# Patient Record
Sex: FEMALE | Race: WHITE | NOT HISPANIC OR LATINO | Employment: OTHER | URBAN - METROPOLITAN AREA
[De-identification: names, ages, dates, MRNs, and addresses within clinical notes are randomized per-mention and may not be internally consistent; named-entity substitution may affect disease eponyms.]

---

## 2021-07-26 LAB — BCS RECOMMENDATION EXT: NORMAL

## 2021-09-22 LAB — BMD RECOMMENDATION EXT: NORMAL

## 2022-06-14 ENCOUNTER — TELEPHONE (OUTPATIENT)
Dept: PRIMARY CARE CLINIC | Facility: CLINIC | Age: 67
End: 2022-06-14

## 2022-06-14 ENCOUNTER — OFFICE VISIT (OUTPATIENT)
Dept: PRIMARY CARE CLINIC | Facility: CLINIC | Age: 67
End: 2022-06-14
Payer: MEDICARE

## 2022-06-14 VITALS
RESPIRATION RATE: 18 BRPM | TEMPERATURE: 98 F | HEIGHT: 64 IN | HEART RATE: 74 BPM | WEIGHT: 120.5 LBS | SYSTOLIC BLOOD PRESSURE: 112 MMHG | DIASTOLIC BLOOD PRESSURE: 70 MMHG | BODY MASS INDEX: 20.57 KG/M2 | OXYGEN SATURATION: 97 %

## 2022-06-14 DIAGNOSIS — E03.9 HYPOTHYROIDISM, UNSPECIFIED TYPE: ICD-10-CM

## 2022-06-14 DIAGNOSIS — M81.0 AGE-RELATED OSTEOPOROSIS WITHOUT CURRENT PATHOLOGICAL FRACTURE: ICD-10-CM

## 2022-06-14 DIAGNOSIS — Z00.00 ANNUAL PHYSICAL EXAM: ICD-10-CM

## 2022-06-14 DIAGNOSIS — M54.41 CHRONIC LOW BACK PAIN WITH RIGHT-SIDED SCIATICA, UNSPECIFIED BACK PAIN LATERALITY: ICD-10-CM

## 2022-06-14 DIAGNOSIS — G89.29 CHRONIC LOW BACK PAIN WITH RIGHT-SIDED SCIATICA, UNSPECIFIED BACK PAIN LATERALITY: ICD-10-CM

## 2022-06-14 DIAGNOSIS — Z76.89 ENCOUNTER TO ESTABLISH CARE: Primary | ICD-10-CM

## 2022-06-14 DIAGNOSIS — Z13.6 ENCOUNTER FOR SCREENING FOR CARDIOVASCULAR DISORDERS: ICD-10-CM

## 2022-06-14 PROCEDURE — 99204 PR OFFICE/OUTPT VISIT, NEW, LEVL IV, 45-59 MIN: ICD-10-PCS | Mod: S$GLB,,, | Performed by: STUDENT IN AN ORGANIZED HEALTH CARE EDUCATION/TRAINING PROGRAM

## 2022-06-14 PROCEDURE — 3074F PR MOST RECENT SYSTOLIC BLOOD PRESSURE < 130 MM HG: ICD-10-PCS | Mod: CPTII,S$GLB,, | Performed by: STUDENT IN AN ORGANIZED HEALTH CARE EDUCATION/TRAINING PROGRAM

## 2022-06-14 PROCEDURE — 3078F DIAST BP <80 MM HG: CPT | Mod: CPTII,S$GLB,, | Performed by: STUDENT IN AN ORGANIZED HEALTH CARE EDUCATION/TRAINING PROGRAM

## 2022-06-14 PROCEDURE — 1159F MED LIST DOCD IN RCRD: CPT | Mod: CPTII,S$GLB,, | Performed by: STUDENT IN AN ORGANIZED HEALTH CARE EDUCATION/TRAINING PROGRAM

## 2022-06-14 PROCEDURE — 1101F PR PT FALLS ASSESS DOC 0-1 FALLS W/OUT INJ PAST YR: ICD-10-PCS | Mod: CPTII,S$GLB,, | Performed by: STUDENT IN AN ORGANIZED HEALTH CARE EDUCATION/TRAINING PROGRAM

## 2022-06-14 PROCEDURE — 99999 PR PBB SHADOW E&M-NEW PATIENT-LVL IV: CPT | Mod: PBBFAC,,, | Performed by: STUDENT IN AN ORGANIZED HEALTH CARE EDUCATION/TRAINING PROGRAM

## 2022-06-14 PROCEDURE — 1160F RVW MEDS BY RX/DR IN RCRD: CPT | Mod: CPTII,S$GLB,, | Performed by: STUDENT IN AN ORGANIZED HEALTH CARE EDUCATION/TRAINING PROGRAM

## 2022-06-14 PROCEDURE — 1126F PR PAIN SEVERITY QUANTIFIED, NO PAIN PRESENT: ICD-10-PCS | Mod: CPTII,S$GLB,, | Performed by: STUDENT IN AN ORGANIZED HEALTH CARE EDUCATION/TRAINING PROGRAM

## 2022-06-14 PROCEDURE — 1101F PT FALLS ASSESS-DOCD LE1/YR: CPT | Mod: CPTII,S$GLB,, | Performed by: STUDENT IN AN ORGANIZED HEALTH CARE EDUCATION/TRAINING PROGRAM

## 2022-06-14 PROCEDURE — 3288F FALL RISK ASSESSMENT DOCD: CPT | Mod: CPTII,S$GLB,, | Performed by: STUDENT IN AN ORGANIZED HEALTH CARE EDUCATION/TRAINING PROGRAM

## 2022-06-14 PROCEDURE — 99999 PR PBB SHADOW E&M-NEW PATIENT-LVL IV: ICD-10-PCS | Mod: PBBFAC,,, | Performed by: STUDENT IN AN ORGANIZED HEALTH CARE EDUCATION/TRAINING PROGRAM

## 2022-06-14 PROCEDURE — 1126F AMNT PAIN NOTED NONE PRSNT: CPT | Mod: CPTII,S$GLB,, | Performed by: STUDENT IN AN ORGANIZED HEALTH CARE EDUCATION/TRAINING PROGRAM

## 2022-06-14 PROCEDURE — 3078F PR MOST RECENT DIASTOLIC BLOOD PRESSURE < 80 MM HG: ICD-10-PCS | Mod: CPTII,S$GLB,, | Performed by: STUDENT IN AN ORGANIZED HEALTH CARE EDUCATION/TRAINING PROGRAM

## 2022-06-14 PROCEDURE — 3288F PR FALLS RISK ASSESSMENT DOCUMENTED: ICD-10-PCS | Mod: CPTII,S$GLB,, | Performed by: STUDENT IN AN ORGANIZED HEALTH CARE EDUCATION/TRAINING PROGRAM

## 2022-06-14 PROCEDURE — 99204 OFFICE O/P NEW MOD 45 MIN: CPT | Mod: S$GLB,,, | Performed by: STUDENT IN AN ORGANIZED HEALTH CARE EDUCATION/TRAINING PROGRAM

## 2022-06-14 PROCEDURE — 3074F SYST BP LT 130 MM HG: CPT | Mod: CPTII,S$GLB,, | Performed by: STUDENT IN AN ORGANIZED HEALTH CARE EDUCATION/TRAINING PROGRAM

## 2022-06-14 PROCEDURE — 3008F PR BODY MASS INDEX (BMI) DOCUMENTED: ICD-10-PCS | Mod: CPTII,S$GLB,, | Performed by: STUDENT IN AN ORGANIZED HEALTH CARE EDUCATION/TRAINING PROGRAM

## 2022-06-14 PROCEDURE — 1159F PR MEDICATION LIST DOCUMENTED IN MEDICAL RECORD: ICD-10-PCS | Mod: CPTII,S$GLB,, | Performed by: STUDENT IN AN ORGANIZED HEALTH CARE EDUCATION/TRAINING PROGRAM

## 2022-06-14 PROCEDURE — 1160F PR REVIEW ALL MEDS BY PRESCRIBER/CLIN PHARMACIST DOCUMENTED: ICD-10-PCS | Mod: CPTII,S$GLB,, | Performed by: STUDENT IN AN ORGANIZED HEALTH CARE EDUCATION/TRAINING PROGRAM

## 2022-06-14 PROCEDURE — 3008F BODY MASS INDEX DOCD: CPT | Mod: CPTII,S$GLB,, | Performed by: STUDENT IN AN ORGANIZED HEALTH CARE EDUCATION/TRAINING PROGRAM

## 2022-06-14 RX ORDER — LYSINE HCL 500 MG
1 TABLET ORAL 2 TIMES DAILY
Qty: 180 TABLET | Refills: 3 | Status: SHIPPED | OUTPATIENT
Start: 2022-06-14 | End: 2023-09-26

## 2022-06-14 RX ORDER — LEVOTHYROXINE SODIUM 75 UG/1
TABLET ORAL DAILY
COMMUNITY
Start: 2006-01-01 | End: 2022-08-31

## 2022-06-14 RX ORDER — ALENDRONATE SODIUM 70 MG/1
TABLET ORAL
COMMUNITY
Start: 2022-06-06 | End: 2022-09-28 | Stop reason: SDUPTHER

## 2022-06-14 NOTE — PROGRESS NOTES
Subjective:       Patient ID: Kalina Ivan is a 67 y.o. female.    Chief Complaint: Annual Exam and Establish Care      HPI:  67 y.o. female presents to [facility] here for annual and to establish care    Acute concerns?: Here for annual exam, feels well overall. 6 months ago when appointment was made was feeling off, but likely thyroid related. Was recently changed from Armor thyroid hormone now on synthroid.    Reports bone scan was performed 2/2022 and now on alendronate and was concerned with history of silent reflux.    Reports history of lower back pain history and in PT a this time. In [facility] therapy. No history of imaging out of bone scan.    Onset?: Years  Progression?: Persistent, since age [age]  Inciting incident/new physical activity?: Fell down steps and hit tailbone age 11  Past trauma/surgery?: Fall on tailbone, no surgeries  Recurrent?: Waxes and wanes  Description?: Dull ache, can get twinge pains  Radiates?: Can radiate into lateral thigh at times  Severity?: 9/10 at worst, quite some time, ok a this time, just a twinge  Worsening factors?: Heavy lifting and slouching  Interventions?: Cortisone injection in past did help, stretching and exercise, pilates helped  Did interventions help?: Yes    Last PCP?: Saw Azevedo at [facility]  Allergies: Hazelnut  Medical History: osteoporosis, hypothyroidism (hashimoto's likely), silent reflux  Medications: alendronate 70 mg qWeek, levothyroxine 75 mg  Surgical History: None  Family History: Father leukemia early 40s; no known autoimmune disease in family  Social History: Never smoker, minimal EtOH, no illicits    No dementia in family. Memory is good overall and no concerns.  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 Hasn't been able to present to Dermatology during an episode.    Singles in 2014, was curious if related to palm findings.    Is receiving  "2nd shingles dose next week.    Fasting?: No  Hep C screening?: Amenable, await lab request  HIV screening?: Amenable, await lab request  DEXA?: 2/2022  Colonoscopy?: 1/2021 10 year follow-up  Mammogram?: 7/2021, was normal, will follow-up at [facility]  Last tetanus vaccine?: 2021 cut in garden at clinic [facility]    Review of Systems   Constitutional: Negative for chills, diaphoresis, fatigue and fever.   HENT: Negative for congestion, sinus pressure, sneezing and sore throat.    Respiratory: Negative for cough and shortness of breath.    Cardiovascular: Negative for chest pain and palpitations.   Gastrointestinal: Negative for abdominal pain, diarrhea, nausea and vomiting.   Endocrine: Negative for cold intolerance, heat intolerance, polydipsia and polyuria.   Musculoskeletal: Negative for joint swelling and myalgias.   Skin: Negative for rash and wound.        Intermittent dermatitis to left hand   Neurological: Negative for dizziness, weakness, light-headedness and numbness.       Objective:      Vitals:    06/14/22 1022   BP: 112/70   BP Location: Right arm   Patient Position: Sitting   BP Method: Medium (Manual)   Pulse: 74   Resp: 18   Temp: 98 °F (36.7 °C)   TempSrc: Oral   SpO2: 97%   Weight: 54.6 kg (120 lb 7.7 oz)   Height: 5' 4" (1.626 m)     Physical Exam  Vitals reviewed.   Constitutional:       General: She is not in acute distress.     Appearance: Normal appearance. She is not ill-appearing.   HENT:      Head: Normocephalic and atraumatic.   Eyes:      General:         Right eye: No discharge.         Left eye: No discharge.      Conjunctiva/sclera: Conjunctivae normal.   Cardiovascular:      Rate and Rhythm: Normal rate and regular rhythm.      Pulses:           Dorsalis pedis pulses are 1+ on the right side and 1+ on the left side.      Heart sounds: Normal heart sounds.   Pulmonary:      Effort: Pulmonary effort is normal.      Breath sounds: Normal breath sounds.   Abdominal:     "  General: Abdomen is flat. Bowel sounds are normal. There is no distension.      Palpations: Abdomen is soft. There is no mass.      Tenderness: There is no abdominal tenderness. There is no guarding or rebound.   Musculoskeletal:         General: No deformity.      Cervical back: Neck supple. No rigidity.      Right lower leg: No edema.      Left lower leg: No edema.   Skin:     General: Skin is warm and dry.      Coloration: Skin is not jaundiced.   Neurological:      General: No focal deficit present.      Mental Status: She is alert and oriented to person, place, and time.   Psychiatric:         Mood and Affect: Mood normal.         Behavior: Behavior normal.             No results found for: NA, K, CL, CO2, BUN, CREATININE, GLUCOSE, ANIONGAP  No results found for: HGBA1C  No results found for: BNP, BNPTRIAGEBLO    No results found for: WBC, HGB, HCT, PLT, GRAN  No results found for: CHOL, HDL, LDLCALC, TRIG       Current Outpatient Medications:     alendronate (FOSAMAX) 70 MG tablet, every 7 days., Disp: , Rfl:     levothyroxine (SYNTHROID) 75 MCG tablet, once daily at 6am., Disp: , Rfl:     calcium carbonate-vit D3-min 600 mg calcium- 400 unit Tab, Take 1 tablet by mouth 2 (two) times daily., Disp: 180 tablet, Rfl: 3        Assessment:       1. Encounter to establish care    2. Annual physical exam    3. Hypothyroidism, unspecified type    4. Chronic low back pain with right-sided sciatica, unspecified back pain laterality    5. Age-related osteoporosis without current pathological fracture    6. Encounter for screening for cardiovascular disorders           Plan:       Encounter to establish care  Annual physical exam  -     Comprehensive Metabolic Panel; Future; Expected date: 06/14/2022  -     CBC Auto Differential; Future; Expected date: 06/14/2022    Hypothyroidism, unspecified type  -     TSH; Future; Expected date: 06/14/2022  -     T4, Free; Future; Expected date: 06/14/2022  -     CBC Auto  Differential; Future; Expected date: 06/14/2022    Chronic low back pain with right-sided sciatica, unspecified back pain laterality  Age-related osteoporosis without current pathological fracture  -     calcium carbonate-vit D3-min 600 mg calcium- 400 unit Tab; Take 1 tablet by mouth 2 (two) times daily.  Dispense: 180 tablet; Refill: 3  - Continue PT, patient to contact clinic if desires Pain Management referral as corticosteroid injections in past have worked well for pain    Encounter for screening for cardiovascular disorders  -     Lipid Panel; Future; Expected date: 06/14/2022    Patient will reach out to clinic for Derm referral if active reash to hand recurrs  RTC in 6 months     audio

## 2022-06-14 NOTE — TELEPHONE ENCOUNTER
CASSIA sent to [facility]  ([facility]) at [phone] to get the patient's colonoscopy report.     Patient has 2 charts with us. We were able to print her DEXA and her Mammogram from her 2nd chart MRN [id number]

## 2022-06-15 ENCOUNTER — PATIENT MESSAGE (OUTPATIENT)
Dept: ADMINISTRATIVE | Facility: HOSPITAL | Age: 67
End: 2022-06-15
Payer: MEDICARE

## 2022-06-21 ENCOUNTER — PATIENT OUTREACH (OUTPATIENT)
Dept: ADMINISTRATIVE | Facility: HOSPITAL | Age: 67
End: 2022-06-21
Payer: MEDICARE

## 2022-06-21 NOTE — PROGRESS NOTES
Chart review done.  updated. Immunizations reviewed & updated. Care Everywhere updated.   CASSIA SENT TO DR LOBATO FOR THE PATIENT COLONOSCOPY RESULTS

## 2022-06-21 NOTE — LETTER
AUTHORIZATION FOR RELEASE OF   CONFIDENTIAL INFORMATION    Dear MEDICAL RECORDS,    We are seeing Kalina Ivan, date of birth 1955, in the clinic at [facility]. Grzegorz Bolton MD is the patient's PCP. Kalina Ivan has an outstanding lab/procedure at the time we reviewed her chart. In order to help keep her health information updated, she has authorized us to request the following medical record(s):        (  )  MAMMOGRAM                                      ( X )  COLONOSCOPY      (  )  PAP SMEAR                                          (  )  OUTSIDE LAB RESULTS     (  )  DEXA SCAN                                          (  )  EYE EXAM            (  )  FOOT EXAM                                          (  )  ENTIRE RECORD     (  )  OUTSIDE IMMUNIZATIONS                 (  )  _______________         [facility], [phone]    Patient Name: Kalina Ivan  : 1955  Patient Phone #: [phone]

## 2022-07-05 ENCOUNTER — TELEPHONE (OUTPATIENT)
Dept: PRIMARY CARE CLINIC | Facility: CLINIC | Age: 67
End: 2022-07-05
Payer: MEDICARE

## 2022-07-05 DIAGNOSIS — H53.31 ABNORMAL RETINAL CORRESPONDENCE: Primary | ICD-10-CM

## 2022-07-06 NOTE — TELEPHONE ENCOUNTER
----- Message from Christy Luis sent at 7/5/2022  1:06 PM CDT -----  Contact: [phone]  Pt is calling she is needing a referral for an opthalmology they are thinking she has a tear in her retina please advise and give return call ASAP     
Called pt. No answer left detailed vm that Dr. Bolton has placed a  ophthalmology referral for her.   
Quality 226: Preventive Care And Screening: Tobacco Use: Screening And Cessation Intervention: Patient screened for tobacco use and is an ex/non-smoker
Quality 110: Preventive Care And Screening: Influenza Immunization: Influenza Immunization Administered during Influenza season
Detail Level: Detailed
Quality 111:Pneumonia Vaccination Status For Older Adults: Pneumococcal Vaccination Previously Received

## 2022-07-27 ENCOUNTER — PATIENT OUTREACH (OUTPATIENT)
Dept: ADMINISTRATIVE | Facility: HOSPITAL | Age: 67
End: 2022-07-27
Payer: MEDICARE

## 2022-07-27 NOTE — PROGRESS NOTES
Chart review done. HM updated. Immunizations reviewed & updated. Care Everywhere updated.   CASSIA SENT TO DR LOBATO AND [facility] FOR THE PATIENT COLONOSCOPY RESULTS

## 2022-07-27 NOTE — LETTER
AUTHORIZATION FOR RELEASE OF   CONFIDENTIAL INFORMATION    Dear MEDICAL RECORDS  2ND REQUEST,    We are seeing Kalina Ivan, date of birth 1955, in the clinic at [facility]. Grzegorz Bolton MD is the patient's PCP. Kalina Ivan has an outstanding lab/procedure at the time we reviewed her chart. In order to help keep her health information updated, she has authorized us to request the following medical record(s):        (  )  MAMMOGRAM                                      ( X )  COLONOSCOPY      (  )  PAP SMEAR                                          (  )  OUTSIDE LAB RESULTS     (  )  DEXA SCAN                                          (  )  EYE EXAM            (  )  FOOT EXAM                                          (  )  ENTIRE RECORD     (  )  OUTSIDE IMMUNIZATIONS                 (  )  _______________         Please fax records to [facility], Matthew D Morgan, MD, [phone]    Patient Name: Kalina Ivan  : 1955  Patient Phone #: [phone]

## 2022-07-27 NOTE — LETTER
AUTHORIZATION FOR RELEASE OF   CONFIDENTIAL INFORMATION    Dear MEDICAL RECORDS,    We are seeing Kalina Ivan, date of birth 1955, in the clinic at [facility]. Grzegorz Bolton MD is the patient's PCP. Kalina Ivan has an outstanding lab/procedure at the time we reviewed her chart. In order to help keep her health information updated, she has authorized us to request the following medical record(s):        (  )  MAMMOGRAM                                      (X  )  COLONOSCOPY      (  )  PAP SMEAR                                          (  )  OUTSIDE LAB RESULTS     (  )  DEXA SCAN                                          (  )  EYE EXAM            (  )  FOOT EXAM                                          (  )  ENTIRE RECORD     (  )  OUTSIDE IMMUNIZATIONS                 (  )  _______________         [facility], [phone]    Patient Name: Kalina Ivan  : 1955  Patient Phone #: [phone]

## 2022-07-29 ENCOUNTER — TELEPHONE (OUTPATIENT)
Dept: PRIMARY CARE CLINIC | Facility: CLINIC | Age: 67
End: 2022-07-29
Payer: MEDICARE

## 2022-07-29 DIAGNOSIS — Z12.31 BREAST CANCER SCREENING BY MAMMOGRAM: Primary | ICD-10-CM

## 2022-07-29 NOTE — TELEPHONE ENCOUNTER
----- Message from Luz Maria Devine sent at 7/29/2022  9:05 AM CDT -----  Contact: Patient, [phone]  Caller is requesting to schedule their annual screening mammogram appointment. Order is not listed in Epic.  Please enter order and contact patient to schedule.  Would the patient like a call back, or a response through their [facility] portal?:   Call back, please fax to [employer]. Thanks.

## 2022-08-24 ENCOUNTER — PATIENT MESSAGE (OUTPATIENT)
Dept: PRIMARY CARE CLINIC | Facility: CLINIC | Age: 67
End: 2022-08-24
Payer: MEDICARE

## 2022-08-31 ENCOUNTER — OFFICE VISIT (OUTPATIENT)
Dept: PRIMARY CARE CLINIC | Facility: CLINIC | Age: 67
End: 2022-08-31
Payer: MEDICARE

## 2022-08-31 ENCOUNTER — PATIENT MESSAGE (OUTPATIENT)
Dept: PRIMARY CARE CLINIC | Facility: CLINIC | Age: 67
End: 2022-08-31

## 2022-08-31 VITALS
RESPIRATION RATE: 16 BRPM | TEMPERATURE: 98 F | HEIGHT: 64 IN | SYSTOLIC BLOOD PRESSURE: 136 MMHG | BODY MASS INDEX: 20.65 KG/M2 | WEIGHT: 120.94 LBS | DIASTOLIC BLOOD PRESSURE: 72 MMHG | OXYGEN SATURATION: 98 % | HEART RATE: 71 BPM

## 2022-08-31 DIAGNOSIS — Z51.81 MEDICATION MONITORING ENCOUNTER: ICD-10-CM

## 2022-08-31 DIAGNOSIS — F32.A DEPRESSION, UNSPECIFIED DEPRESSION TYPE: ICD-10-CM

## 2022-08-31 DIAGNOSIS — M54.9 DORSALGIA, UNSPECIFIED: ICD-10-CM

## 2022-08-31 DIAGNOSIS — E03.9 HYPOTHYROIDISM, UNSPECIFIED TYPE: Primary | ICD-10-CM

## 2022-08-31 DIAGNOSIS — G89.29 CHRONIC BILATERAL LOW BACK PAIN WITHOUT SCIATICA: ICD-10-CM

## 2022-08-31 DIAGNOSIS — M54.50 CHRONIC BILATERAL LOW BACK PAIN WITHOUT SCIATICA: ICD-10-CM

## 2022-08-31 DIAGNOSIS — M53.3 SI (SACROILIAC) JOINT DYSFUNCTION: ICD-10-CM

## 2022-08-31 DIAGNOSIS — R21 RASH AND NONSPECIFIC SKIN ERUPTION: ICD-10-CM

## 2022-08-31 PROCEDURE — 1125F PR PAIN SEVERITY QUANTIFIED, PAIN PRESENT: ICD-10-PCS | Mod: CPTII,S$GLB,, | Performed by: STUDENT IN AN ORGANIZED HEALTH CARE EDUCATION/TRAINING PROGRAM

## 2022-08-31 PROCEDURE — 3008F PR BODY MASS INDEX (BMI) DOCUMENTED: ICD-10-PCS | Mod: CPTII,S$GLB,, | Performed by: STUDENT IN AN ORGANIZED HEALTH CARE EDUCATION/TRAINING PROGRAM

## 2022-08-31 PROCEDURE — 1159F PR MEDICATION LIST DOCUMENTED IN MEDICAL RECORD: ICD-10-PCS | Mod: CPTII,S$GLB,, | Performed by: STUDENT IN AN ORGANIZED HEALTH CARE EDUCATION/TRAINING PROGRAM

## 2022-08-31 PROCEDURE — 3008F BODY MASS INDEX DOCD: CPT | Mod: CPTII,S$GLB,, | Performed by: STUDENT IN AN ORGANIZED HEALTH CARE EDUCATION/TRAINING PROGRAM

## 2022-08-31 PROCEDURE — 3078F DIAST BP <80 MM HG: CPT | Mod: CPTII,S$GLB,, | Performed by: STUDENT IN AN ORGANIZED HEALTH CARE EDUCATION/TRAINING PROGRAM

## 2022-08-31 PROCEDURE — 99999 PR PBB SHADOW E&M-EST. PATIENT-LVL V: CPT | Mod: PBBFAC,,, | Performed by: STUDENT IN AN ORGANIZED HEALTH CARE EDUCATION/TRAINING PROGRAM

## 2022-08-31 PROCEDURE — 1101F PR PT FALLS ASSESS DOC 0-1 FALLS W/OUT INJ PAST YR: ICD-10-PCS | Mod: CPTII,S$GLB,, | Performed by: STUDENT IN AN ORGANIZED HEALTH CARE EDUCATION/TRAINING PROGRAM

## 2022-08-31 PROCEDURE — 99999 PR PBB SHADOW E&M-EST. PATIENT-LVL V: ICD-10-PCS | Mod: PBBFAC,,, | Performed by: STUDENT IN AN ORGANIZED HEALTH CARE EDUCATION/TRAINING PROGRAM

## 2022-08-31 PROCEDURE — 1160F RVW MEDS BY RX/DR IN RCRD: CPT | Mod: CPTII,S$GLB,, | Performed by: STUDENT IN AN ORGANIZED HEALTH CARE EDUCATION/TRAINING PROGRAM

## 2022-08-31 PROCEDURE — 1125F AMNT PAIN NOTED PAIN PRSNT: CPT | Mod: CPTII,S$GLB,, | Performed by: STUDENT IN AN ORGANIZED HEALTH CARE EDUCATION/TRAINING PROGRAM

## 2022-08-31 PROCEDURE — 1101F PT FALLS ASSESS-DOCD LE1/YR: CPT | Mod: CPTII,S$GLB,, | Performed by: STUDENT IN AN ORGANIZED HEALTH CARE EDUCATION/TRAINING PROGRAM

## 2022-08-31 PROCEDURE — 1159F MED LIST DOCD IN RCRD: CPT | Mod: CPTII,S$GLB,, | Performed by: STUDENT IN AN ORGANIZED HEALTH CARE EDUCATION/TRAINING PROGRAM

## 2022-08-31 PROCEDURE — 3075F PR MOST RECENT SYSTOLIC BLOOD PRESS GE 130-139MM HG: ICD-10-PCS | Mod: CPTII,S$GLB,, | Performed by: STUDENT IN AN ORGANIZED HEALTH CARE EDUCATION/TRAINING PROGRAM

## 2022-08-31 PROCEDURE — 3078F PR MOST RECENT DIASTOLIC BLOOD PRESSURE < 80 MM HG: ICD-10-PCS | Mod: CPTII,S$GLB,, | Performed by: STUDENT IN AN ORGANIZED HEALTH CARE EDUCATION/TRAINING PROGRAM

## 2022-08-31 PROCEDURE — 3288F PR FALLS RISK ASSESSMENT DOCUMENTED: ICD-10-PCS | Mod: CPTII,S$GLB,, | Performed by: STUDENT IN AN ORGANIZED HEALTH CARE EDUCATION/TRAINING PROGRAM

## 2022-08-31 PROCEDURE — 3075F SYST BP GE 130 - 139MM HG: CPT | Mod: CPTII,S$GLB,, | Performed by: STUDENT IN AN ORGANIZED HEALTH CARE EDUCATION/TRAINING PROGRAM

## 2022-08-31 PROCEDURE — 3288F FALL RISK ASSESSMENT DOCD: CPT | Mod: CPTII,S$GLB,, | Performed by: STUDENT IN AN ORGANIZED HEALTH CARE EDUCATION/TRAINING PROGRAM

## 2022-08-31 PROCEDURE — 99214 PR OFFICE/OUTPT VISIT, EST, LEVL IV, 30-39 MIN: ICD-10-PCS | Mod: S$GLB,,, | Performed by: STUDENT IN AN ORGANIZED HEALTH CARE EDUCATION/TRAINING PROGRAM

## 2022-08-31 PROCEDURE — 1160F PR REVIEW ALL MEDS BY PRESCRIBER/CLIN PHARMACIST DOCUMENTED: ICD-10-PCS | Mod: CPTII,S$GLB,, | Performed by: STUDENT IN AN ORGANIZED HEALTH CARE EDUCATION/TRAINING PROGRAM

## 2022-08-31 PROCEDURE — 99214 OFFICE O/P EST MOD 30 MIN: CPT | Mod: S$GLB,,, | Performed by: STUDENT IN AN ORGANIZED HEALTH CARE EDUCATION/TRAINING PROGRAM

## 2022-08-31 RX ORDER — ST. JOHN'S WORT 300 MG
300 CAPSULE ORAL
Qty: 90 EACH | Refills: 11 | Status: SHIPPED | OUTPATIENT
Start: 2022-08-31 | End: 2022-12-20

## 2022-08-31 RX ORDER — THYROID 30 MG/1
45 TABLET ORAL
Qty: 45 TABLET | Refills: 11 | Status: SHIPPED | OUTPATIENT
Start: 2022-08-31 | End: 2022-10-07 | Stop reason: SDUPTHER

## 2022-08-31 NOTE — PROGRESS NOTES
"Subjective:       Patient ID: Kalina Ivan is a 67 y.o. female.    Chief Complaint: Follow-up (Discuss thyroid meds), Low-back Pain (Wants referral to ortho), and Hip Pain (bilateral)      HPI:  67 y.o. female presents to [facility] to discuss changing thyroid medication back to armour thyroid    Was on in past and would like to revert back to prior.    Patient reports not feeling great overall at this time. Feels like she is somewhat depressed, feels could be related to levothyroxine.    Patient is performing regular exercise at this time. Has been attending PT since February. Was recently given a Ulysses Chi video from a friend.    Has been in PT since February and still having back pain. Following with [facility].     No falls or incontinence.    Patient has daily meditation in lace at this time.    Has been in therapy, not last 10 years. Is interested in talking with therapist at this time.    Review of Systems   Constitutional:  Positive for fatigue. Negative for chills, diaphoresis and fever.   Respiratory:  Negative for cough and shortness of breath.    Cardiovascular:  Negative for chest pain and palpitations.   Gastrointestinal:  Negative for abdominal pain, diarrhea, nausea and vomiting.   Musculoskeletal:  Positive for back pain.   Skin:  Negative for rash and wound.   Neurological:  Positive for numbness (in legs). Negative for weakness and headaches.   Psychiatric/Behavioral:  Positive for dysphoric mood. Negative for suicidal ideas.      Objective:      Vitals:    08/31/22 0810   BP: 136/72   BP Location: Left arm   Patient Position: Sitting   BP Method: Medium (Manual)   Pulse: 71   Resp: 16   Temp: 98 °F (36.7 °C)   TempSrc: Oral   SpO2: 98%   Weight: 54.9 kg (120 lb 14.8 oz)   Height: 5' 4" (1.626 m)     Physical Exam  Vitals reviewed.   Constitutional:       General: She is not in acute distress.     Appearance: Normal appearance. She is not ill-appearing.   HENT:      Head: " Normocephalic and atraumatic.      Mouth/Throat:      Mouth: Mucous membranes are moist.      Pharynx: Oropharynx is clear.   Eyes:      General:         Right eye: No discharge.         Left eye: No discharge.   Cardiovascular:      Rate and Rhythm: Normal rate and regular rhythm.      Pulses: Normal pulses.      Heart sounds: Normal heart sounds.   Pulmonary:      Effort: Pulmonary effort is normal.      Breath sounds: Normal breath sounds.   Musculoskeletal:         General: No deformity.      Lumbar back: No swelling, edema, deformity, signs of trauma, lacerations, spasms, tenderness or bony tenderness. Normal range of motion (Aching with extension of back). Negative right straight leg raise test and negative left straight leg raise test. No scoliosis.      Comments: Mild non-raised, non-tender, erythematous rash to low back. Coalescent red incomplete (non-annular) rings with central clearing. (patient attributes to heating pad use). Pain bilateral SI joints.   Skin:     General: Skin is warm and dry.      Coloration: Skin is not jaundiced.   Neurological:      General: No focal deficit present.      Mental Status: She is alert and oriented to person, place, and time.   Psychiatric:         Mood and Affect: Mood normal.         Behavior: Behavior normal.           Lab Results   Component Value Date     06/21/2022    K 4.2 06/21/2022     06/21/2022    CO2 24 06/21/2022    BUN 14 06/21/2022    CREATININE 0.8 06/21/2022    ANIONGAP 10 06/21/2022     Lab Results   Component Value Date    HGBA1C 5.4 08/15/2022     No results found for: BNP, BNPTRIAGEBLO    Lab Results   Component Value Date    WBC 6.84 06/21/2022    HGB 13.9 06/21/2022    HCT 41.8 06/21/2022     06/21/2022    GRAN 3.9 06/21/2022    GRAN 56.4 06/21/2022     Lab Results   Component Value Date    CHOL 237 (H) 06/21/2022    HDL 67 06/21/2022    LDLCALC 146.6 06/21/2022    TRIG 117 06/21/2022          Current Outpatient Medications:      alendronate (FOSAMAX) 70 MG tablet, every 7 days., Disp: , Rfl:     calcium carbonate-vit D3-min 600 mg calcium- 400 unit Tab, Take 1 tablet by mouth 2 (two) times daily., Disp: 180 tablet, Rfl: 3    Johnathon's wort 300 mg Cap, Take 300 mg by mouth 3 (three) times daily with meals., Disp: 90 each, Rfl: 11    thyroid, pork, (ARMOUR THYROID) 30 mg Tab, Take 1.5 tablets (45 mg total) by mouth before breakfast., Disp: 45 tablet, Rfl: 11        Assessment:       1. Hypothyroidism, unspecified type    2. Medication monitoring encounter    3. Chronic bilateral low back pain without sciatica    4. SI (sacroiliac) joint dysfunction    5. Depression, unspecified depression type    6. Rash and nonspecific skin eruption           Plan:       Hypothyroidism, unspecified type  Medication monitoring encounter  Depression, unspecified depression type  -     Johnathon's wort 300 mg Cap; Take 300 mg by mouth 3 (three) times daily with meals.  Dispense: 90 each; Refill: 11  -     Ambulatory referral/consult to Psychology; Future; Expected date: 09/07/2022  -     TSH; Future; Expected date: 08/31/2022  -     T4, Free; Future; Expected date: 08/31/2022  -     thyroid, pork, (ARMOUR THYROID) 30 mg Tab; Take 1.5 tablets (45 mg total) by mouth before breakfast.  Dispense: 45 tablet; Refill: 11  - Conservative measures discussed today. Recommend routine exercise, daily meditation, and breathing exercises.  - Ran Johnathon's Wort through interaction check and OK with current medication    Chronic bilateral low back pain without sciatica  SI (sacroiliac) joint dysfunction  Rash and nonspecific skin eruption  -     X-Ray Sacroiliac Joints Complete; Future; Expected date: 08/31/2022  -     X-Ray Lumbar Spine 5 View; Future; Expected date: 08/31/2022  - With > 6 months of PT at this time, likely need for advanced imaging if x-rays unrevealing  - Patient will have lower back rash evaluated for resolution when seeing Dermatology next  Wednesday    RTC in 4 weeks

## 2022-09-27 ENCOUNTER — PATIENT MESSAGE (OUTPATIENT)
Dept: PRIMARY CARE CLINIC | Facility: CLINIC | Age: 67
End: 2022-09-27
Payer: MEDICARE

## 2022-09-28 ENCOUNTER — PATIENT MESSAGE (OUTPATIENT)
Dept: PRIMARY CARE CLINIC | Facility: CLINIC | Age: 67
End: 2022-09-28
Payer: MEDICARE

## 2022-09-28 DIAGNOSIS — E03.9 HYPOTHYROIDISM, UNSPECIFIED TYPE: Primary | ICD-10-CM

## 2022-09-28 RX ORDER — ALENDRONATE SODIUM 70 MG/1
70 TABLET ORAL
Qty: 12 TABLET | Refills: 1 | Status: SHIPPED | OUTPATIENT
Start: 2022-09-28 | End: 2023-03-09

## 2022-09-28 NOTE — TELEPHONE ENCOUNTER
----- Message from Mari Tristan sent at 9/28/2022  2:20 PM CDT -----  Contact: Pt [phone]  Requesting an RX refill or new RX.  Is this a refill or new RX: Refill  RX name and strength (copy/paste from chart):  alendronate (FOSAMAX) 70 MG tablet  Is this a 30 day or 90 day RX: 30  Pharmacy name and phone # (copy/paste from chart):    [facility]/pharmacy #[id number] - [place] - [address]  [address] [facility] [address]  Phone: [phone] Fax: [phone]    Patient states she needs it before Monday.    Please call and advise.    Thank You

## 2022-10-07 DIAGNOSIS — E03.9 HYPOTHYROIDISM, UNSPECIFIED TYPE: ICD-10-CM

## 2022-10-07 RX ORDER — THYROID 15 MG/1
45 TABLET ORAL
Qty: 270 TABLET | Refills: 3 | Status: SHIPPED | OUTPATIENT
Start: 2022-10-07 | End: 2023-01-17 | Stop reason: SDUPTHER

## 2022-10-10 ENCOUNTER — PATIENT MESSAGE (OUTPATIENT)
Dept: PRIMARY CARE CLINIC | Facility: CLINIC | Age: 67
End: 2022-10-10
Payer: MEDICARE

## 2022-10-10 DIAGNOSIS — G89.29 CHRONIC LOW BACK PAIN WITH RIGHT-SIDED SCIATICA, UNSPECIFIED BACK PAIN LATERALITY: Primary | ICD-10-CM

## 2022-10-10 DIAGNOSIS — M54.41 CHRONIC LOW BACK PAIN WITH RIGHT-SIDED SCIATICA, UNSPECIFIED BACK PAIN LATERALITY: Primary | ICD-10-CM

## 2022-10-24 ENCOUNTER — TELEPHONE (OUTPATIENT)
Dept: PRIMARY CARE CLINIC | Facility: CLINIC | Age: 67
End: 2022-10-24
Payer: MEDICARE

## 2022-10-24 NOTE — TELEPHONE ENCOUNTER
----- Message from Lillian Winn sent at 10/24/2022  3:35 PM CDT -----  Regarding: advice  Contact: mireya [phone]  Please call concerning orders for MRI.

## 2022-11-05 ENCOUNTER — HOSPITAL ENCOUNTER (OUTPATIENT)
Dept: RADIOLOGY | Facility: OTHER | Age: 67
Discharge: HOME | End: 2022-11-05
Attending: STUDENT IN AN ORGANIZED HEALTH CARE EDUCATION/TRAINING PROGRAM
Payer: MEDICARE

## 2022-11-05 DIAGNOSIS — M54.41 CHRONIC LOW BACK PAIN WITH RIGHT-SIDED SCIATICA, UNSPECIFIED BACK PAIN LATERALITY: ICD-10-CM

## 2022-11-05 DIAGNOSIS — G89.29 CHRONIC LOW BACK PAIN WITH RIGHT-SIDED SCIATICA, UNSPECIFIED BACK PAIN LATERALITY: ICD-10-CM

## 2022-11-05 PROCEDURE — 72195 MRI PELVIS W/O DYE: CPT | Mod: 26,,, | Performed by: RADIOLOGY

## 2022-11-05 PROCEDURE — 72195 MRI PELVIS WITHOUT CONTRAST: ICD-10-PCS | Mod: 26,,, | Performed by: RADIOLOGY

## 2022-11-05 PROCEDURE — 72195 MRI PELVIS W/O DYE: CPT | Mod: TC

## 2022-11-07 ENCOUNTER — PATIENT MESSAGE (OUTPATIENT)
Dept: PRIMARY CARE CLINIC | Facility: CLINIC | Age: 67
End: 2022-11-07
Payer: MEDICARE

## 2022-11-07 DIAGNOSIS — M76.00 GLUTEAL TENDINITIS, UNSPECIFIED LATERALITY: Primary | ICD-10-CM

## 2022-11-07 DIAGNOSIS — M76.899 HAMSTRING TENDONITIS: ICD-10-CM

## 2022-11-11 ENCOUNTER — TELEPHONE (OUTPATIENT)
Dept: ORTHOPEDICS | Facility: CLINIC | Age: 67
End: 2022-11-11
Payer: MEDICARE

## 2022-11-15 ENCOUNTER — TELEPHONE (OUTPATIENT)
Dept: ORTHOPEDICS | Facility: CLINIC | Age: 67
End: 2022-11-15
Payer: MEDICAID

## 2022-11-15 DIAGNOSIS — M51.36 DDD (DEGENERATIVE DISC DISEASE), LUMBAR: Primary | ICD-10-CM

## 2022-11-16 ENCOUNTER — TELEPHONE (OUTPATIENT)
Dept: ORTHOPEDICS | Facility: CLINIC | Age: 67
End: 2022-11-16
Payer: MEDICAID

## 2022-11-16 NOTE — TELEPHONE ENCOUNTER
----- Message from Angely Gaytan sent at 11/16/2022  9:55 AM CST -----  Contact: pt  Pt wants to reschedule appt , pt is not feeling wel , having trouble scheduling x-ray no available appts       Confirmed patient's contact info below:  Contact Name: Kalina Ivan  Phone Number: [phone]

## 2022-11-17 ENCOUNTER — TELEPHONE (OUTPATIENT)
Dept: PRIMARY CARE CLINIC | Facility: CLINIC | Age: 67
End: 2022-11-17
Payer: MEDICAID

## 2022-11-17 NOTE — TELEPHONE ENCOUNTER
----- Message from Shelly Magallon sent at 11/17/2022 11:37 AM CST -----  Patient ask for call back had a flu shot 3 weeks ago has been sick took three covin test all came back negative needs advise      Itraconazole Counseling:  I discussed with the patient the risks of itraconazole including but not limited to liver damage, nausea/vomiting, neuropathy, and severe allergy.  The patient understands that this medication is best absorbed when taken with acidic beverages such as non-diet cola or ginger ale.  The patient understands that monitoring is required including baseline LFTs and repeat LFTs at intervals.  The patient understands that they are to contact us or the primary physician if concerning signs are noted.

## 2022-11-17 NOTE — TELEPHONE ENCOUNTER
I spoke with the patient and she said that she's felt bad about 1 week. She said she will just take something OTC. I also gave her the number to Sports medicine since that;'s where she was referred, not ortho.

## 2022-12-13 ENCOUNTER — OFFICE VISIT (OUTPATIENT)
Dept: PRIMARY CARE CLINIC | Facility: CLINIC | Age: 67
End: 2022-12-13
Payer: MEDICARE

## 2022-12-13 ENCOUNTER — TELEPHONE (OUTPATIENT)
Dept: PRIMARY CARE CLINIC | Facility: CLINIC | Age: 67
End: 2022-12-13
Payer: MEDICARE

## 2022-12-13 VITALS
HEART RATE: 93 BPM | HEIGHT: 64 IN | BODY MASS INDEX: 21.06 KG/M2 | TEMPERATURE: 98 F | SYSTOLIC BLOOD PRESSURE: 138 MMHG | WEIGHT: 123.38 LBS | RESPIRATION RATE: 18 BRPM | OXYGEN SATURATION: 98 % | DIASTOLIC BLOOD PRESSURE: 88 MMHG

## 2022-12-13 DIAGNOSIS — R05.3 CHRONIC COUGH: ICD-10-CM

## 2022-12-13 DIAGNOSIS — E03.9 HYPOTHYROIDISM, UNSPECIFIED TYPE: Primary | ICD-10-CM

## 2022-12-13 DIAGNOSIS — R05.3 CHRONIC COUGH: Primary | ICD-10-CM

## 2022-12-13 PROCEDURE — 1125F AMNT PAIN NOTED PAIN PRSNT: CPT | Mod: CPTII,S$GLB,, | Performed by: STUDENT IN AN ORGANIZED HEALTH CARE EDUCATION/TRAINING PROGRAM

## 2022-12-13 PROCEDURE — 99999 PR PBB SHADOW E&M-EST. PATIENT-LVL III: CPT | Mod: PBBFAC,,, | Performed by: STUDENT IN AN ORGANIZED HEALTH CARE EDUCATION/TRAINING PROGRAM

## 2022-12-13 PROCEDURE — 3288F PR FALLS RISK ASSESSMENT DOCUMENTED: ICD-10-PCS | Mod: CPTII,S$GLB,, | Performed by: STUDENT IN AN ORGANIZED HEALTH CARE EDUCATION/TRAINING PROGRAM

## 2022-12-13 PROCEDURE — 99214 OFFICE O/P EST MOD 30 MIN: CPT | Mod: S$GLB,,, | Performed by: STUDENT IN AN ORGANIZED HEALTH CARE EDUCATION/TRAINING PROGRAM

## 2022-12-13 PROCEDURE — 3075F SYST BP GE 130 - 139MM HG: CPT | Mod: CPTII,S$GLB,, | Performed by: STUDENT IN AN ORGANIZED HEALTH CARE EDUCATION/TRAINING PROGRAM

## 2022-12-13 PROCEDURE — 3288F FALL RISK ASSESSMENT DOCD: CPT | Mod: CPTII,S$GLB,, | Performed by: STUDENT IN AN ORGANIZED HEALTH CARE EDUCATION/TRAINING PROGRAM

## 2022-12-13 PROCEDURE — 99999 PR PBB SHADOW E&M-EST. PATIENT-LVL III: ICD-10-PCS | Mod: PBBFAC,,, | Performed by: STUDENT IN AN ORGANIZED HEALTH CARE EDUCATION/TRAINING PROGRAM

## 2022-12-13 PROCEDURE — 1159F MED LIST DOCD IN RCRD: CPT | Mod: CPTII,S$GLB,, | Performed by: STUDENT IN AN ORGANIZED HEALTH CARE EDUCATION/TRAINING PROGRAM

## 2022-12-13 PROCEDURE — 1160F PR REVIEW ALL MEDS BY PRESCRIBER/CLIN PHARMACIST DOCUMENTED: ICD-10-PCS | Mod: CPTII,S$GLB,, | Performed by: STUDENT IN AN ORGANIZED HEALTH CARE EDUCATION/TRAINING PROGRAM

## 2022-12-13 PROCEDURE — 3008F BODY MASS INDEX DOCD: CPT | Mod: CPTII,S$GLB,, | Performed by: STUDENT IN AN ORGANIZED HEALTH CARE EDUCATION/TRAINING PROGRAM

## 2022-12-13 PROCEDURE — 1125F PR PAIN SEVERITY QUANTIFIED, PAIN PRESENT: ICD-10-PCS | Mod: CPTII,S$GLB,, | Performed by: STUDENT IN AN ORGANIZED HEALTH CARE EDUCATION/TRAINING PROGRAM

## 2022-12-13 PROCEDURE — 3079F PR MOST RECENT DIASTOLIC BLOOD PRESSURE 80-89 MM HG: ICD-10-PCS | Mod: CPTII,S$GLB,, | Performed by: STUDENT IN AN ORGANIZED HEALTH CARE EDUCATION/TRAINING PROGRAM

## 2022-12-13 PROCEDURE — 1101F PT FALLS ASSESS-DOCD LE1/YR: CPT | Mod: CPTII,S$GLB,, | Performed by: STUDENT IN AN ORGANIZED HEALTH CARE EDUCATION/TRAINING PROGRAM

## 2022-12-13 PROCEDURE — 1101F PR PT FALLS ASSESS DOC 0-1 FALLS W/OUT INJ PAST YR: ICD-10-PCS | Mod: CPTII,S$GLB,, | Performed by: STUDENT IN AN ORGANIZED HEALTH CARE EDUCATION/TRAINING PROGRAM

## 2022-12-13 PROCEDURE — 3075F PR MOST RECENT SYSTOLIC BLOOD PRESS GE 130-139MM HG: ICD-10-PCS | Mod: CPTII,S$GLB,, | Performed by: STUDENT IN AN ORGANIZED HEALTH CARE EDUCATION/TRAINING PROGRAM

## 2022-12-13 PROCEDURE — 3079F DIAST BP 80-89 MM HG: CPT | Mod: CPTII,S$GLB,, | Performed by: STUDENT IN AN ORGANIZED HEALTH CARE EDUCATION/TRAINING PROGRAM

## 2022-12-13 PROCEDURE — 1160F RVW MEDS BY RX/DR IN RCRD: CPT | Mod: CPTII,S$GLB,, | Performed by: STUDENT IN AN ORGANIZED HEALTH CARE EDUCATION/TRAINING PROGRAM

## 2022-12-13 PROCEDURE — 99214 PR OFFICE/OUTPT VISIT, EST, LEVL IV, 30-39 MIN: ICD-10-PCS | Mod: S$GLB,,, | Performed by: STUDENT IN AN ORGANIZED HEALTH CARE EDUCATION/TRAINING PROGRAM

## 2022-12-13 PROCEDURE — 1159F PR MEDICATION LIST DOCUMENTED IN MEDICAL RECORD: ICD-10-PCS | Mod: CPTII,S$GLB,, | Performed by: STUDENT IN AN ORGANIZED HEALTH CARE EDUCATION/TRAINING PROGRAM

## 2022-12-13 PROCEDURE — 3008F PR BODY MASS INDEX (BMI) DOCUMENTED: ICD-10-PCS | Mod: CPTII,S$GLB,, | Performed by: STUDENT IN AN ORGANIZED HEALTH CARE EDUCATION/TRAINING PROGRAM

## 2022-12-13 RX ORDER — DOXYCYCLINE 100 MG/1
100 CAPSULE ORAL EVERY 12 HOURS
Qty: 14 CAPSULE | Refills: 0 | Status: SHIPPED | OUTPATIENT
Start: 2022-12-13 | End: 2022-12-20

## 2022-12-13 NOTE — TELEPHONE ENCOUNTER
----- Message from Christy Lius sent at 12/13/2022 11:06 AM CST -----  Contact: [phone]  Pt is calling for the nurse she states she has been sick for a month and she went to  and they advised her to call the office to see if some labs can be ran for her please give return call

## 2022-12-13 NOTE — PROGRESS NOTES
"Subjective:       Patient ID: Kalina Ivan is a 67 y.o. female.    Chief Complaint: Cough, Fatigue, Shoulder Pain, and Chest Pain    HPI:  67 y.o. female presents to [facility] with complaints of ongoing cough, chest pain and shoulder pain.    Patient reports was seen in Urgent Care today and directed to see PCP. Has been having off and on URI this month with acute worsening 12/12/2022. Patient is experiencing extreme fatigue, has been coughing a lot. Feels tightness around upper chest and throat. Able to breath through sinuses OK at this time. Feels when looking in mirror sometimes anterior throat looks somewhat swollen. No trial of antibiotic to date. Chest pain began 12/12/2022. Patient did fell like last night had some difficulty with breathing. Does still have cough medication and benzonatate.    Had been prescribed 2 cough medications in past.    Was seen in Urgent Care 12/13/2022 concerns nad negative flu and COVID.    Blood work?: None today  CXR?: Not performed    Review of Systems   Constitutional:  Positive for fatigue. Negative for chills, diaphoresis and fever.   HENT:  Positive for voice change (intially couldn't speak). Negative for congestion, sinus pressure, sneezing and sore throat (having to clear throat more).    Respiratory:  Positive for cough (non-bloody sputum) and chest tightness. Negative for shortness of breath (Isolated 12/12/2022).    Cardiovascular:  Positive for chest pain. Negative for palpitations.   Gastrointestinal:  Positive for diarrhea. Negative for abdominal pain, nausea and vomiting.   Skin:  Negative for rash and wound.   Neurological:  Negative for headaches.     Objective:      Vitals:    12/13/22 1420   BP: 138/88   BP Location: Left arm   Patient Position: Sitting   BP Method: Medium (Manual)   Pulse: 93   Resp: 18   Temp: 97.7 °F (36.5 °C)   TempSrc: Temporal   SpO2: 98%   Weight: 56 kg (123 lb 5.6 oz)   Height: 5' 4" (1.626 m)     Physical Exam  Vitals reviewed. "   Constitutional:       General: She is not in acute distress.     Appearance: Normal appearance. She is not ill-appearing.   HENT:      Head: Normocephalic and atraumatic.      Mouth/Throat:      Mouth: Mucous membranes are moist.      Pharynx: Oropharynx is clear. No oropharyngeal exudate or posterior oropharyngeal erythema.   Eyes:      General:         Right eye: No discharge.         Left eye: No discharge.      Conjunctiva/sclera: Conjunctivae normal.   Neck:      Thyroid: No thyroid mass, thyromegaly or thyroid tenderness.   Cardiovascular:      Rate and Rhythm: Normal rate and regular rhythm.      Heart sounds: Normal heart sounds. No murmur heard.  Pulmonary:      Effort: Pulmonary effort is normal.      Breath sounds: Normal breath sounds. No stridor. No wheezing.   Musculoskeletal:         General: No deformity.      Cervical back: Neck supple. No rigidity.   Lymphadenopathy:      Cervical: No cervical adenopathy.   Skin:     General: Skin is warm and dry.      Coloration: Skin is not jaundiced.   Neurological:      General: No focal deficit present.      Mental Status: She is alert and oriented to person, place, and time.   Psychiatric:         Mood and Affect: Mood normal.         Behavior: Behavior normal.           Lab Results   Component Value Date     06/21/2022    K 4.2 06/21/2022     06/21/2022    CO2 24 06/21/2022    BUN 14 06/21/2022    CREATININE 0.8 06/21/2022    ANIONGAP 10 06/21/2022     Lab Results   Component Value Date    HGBA1C 5.4 08/15/2022     No results found for: BNP, BNPTRIAGEBLO    Lab Results   Component Value Date    WBC 6.84 06/21/2022    HGB 13.9 06/21/2022    HCT 41.8 06/21/2022     06/21/2022    GRAN 3.9 06/21/2022    GRAN 56.4 06/21/2022     Lab Results   Component Value Date    CHOL 237 (H) 06/21/2022    HDL 67 06/21/2022    LDLCALC 146.6 06/21/2022    TRIG 117 06/21/2022          Current Outpatient Medications:     alendronate (FOSAMAX) 70 MG tablet,  Take 1 tablet (70 mg total) by mouth every 7 days., Disp: 12 tablet, Rfl: 1    calcium carbonate-vit D3-min 600 mg calcium- 400 unit Tab, Take 1 tablet by mouth 2 (two) times daily., Disp: 180 tablet, Rfl: 3    [place]'s wort 300 mg Cap, Take 300 mg by mouth 3 (three) times daily with meals., Disp: 90 each, Rfl: 11    thyroid, pork, (ARMOUR THYROID) 15 mg Tab, Take 3 tablets (45 mg total) by mouth before breakfast., Disp: 270 tablet, Rfl: 3    doxycycline (VIBRAMYCIN) 100 MG Cap, Take 1 capsule (100 mg total) by mouth every 12 (twelve) hours. for 7 days, Disp: 14 capsule, Rfl: 0        Assessment:       1. Hypothyroidism, unspecified type    2. Chronic cough           Plan:           Chronic cough  Hypothyroidism, unspecified type  -     TSH; Future; Expected date: 12/13/2022  -     T4, free; Future; Expected date: 12/13/2022  -     T3; Future; Expected date: 12/13/2022  -     CBC Auto Differential; Future; Expected date: 12/13/2022  -     Comprehensive Metabolic Panel; Future; Expected date: 12/13/2022  -     POCT COVID-19 Rapid Screening  -     Influenza - Quadrivalent (PF)  -     doxycycline (VIBRAMYCIN) 100 MG Cap; Take 1 capsule (100 mg total) by mouth every 12 (twelve) hours. for 7 days  Dispense: 14 capsule; Refill: 0  - Will avoid alendronate until symptoms resolve  - Conservative care measures provided today's visit  - Present to ED if severely fatigued or respiratory distress develops   - Will attempt antibiotic trial at this time with cough x1 month    RTC in 4 weeks

## 2022-12-14 ENCOUNTER — PATIENT MESSAGE (OUTPATIENT)
Dept: PRIMARY CARE CLINIC | Facility: CLINIC | Age: 67
End: 2022-12-14
Payer: MEDICARE

## 2022-12-14 DIAGNOSIS — U07.1 COVID: Primary | ICD-10-CM

## 2022-12-19 ENCOUNTER — HOSPITAL ENCOUNTER (OUTPATIENT)
Dept: RADIOLOGY | Facility: HOSPITAL | Age: 67
Discharge: HOME | End: 2022-12-19
Attending: NURSE PRACTITIONER
Payer: MEDICARE

## 2022-12-19 ENCOUNTER — OFFICE VISIT (OUTPATIENT)
Dept: ORTHOPEDICS | Facility: CLINIC | Age: 67
End: 2022-12-19
Payer: MEDICARE

## 2022-12-19 VITALS — HEIGHT: 64 IN | WEIGHT: 123.44 LBS | BODY MASS INDEX: 21.07 KG/M2

## 2022-12-19 DIAGNOSIS — R52 PAIN: ICD-10-CM

## 2022-12-19 DIAGNOSIS — M70.61 TROCHANTERIC BURSITIS OF BOTH HIPS: Primary | ICD-10-CM

## 2022-12-19 DIAGNOSIS — M76.00 GLUTEAL TENDINITIS, UNSPECIFIED LATERALITY: ICD-10-CM

## 2022-12-19 DIAGNOSIS — M70.62 TROCHANTERIC BURSITIS OF BOTH HIPS: Primary | ICD-10-CM

## 2022-12-19 DIAGNOSIS — M76.899 HAMSTRING TENDONITIS: ICD-10-CM

## 2022-12-19 DIAGNOSIS — M76.30 ILIOTIBIAL BAND SYNDROME, UNSPECIFIED LATERALITY: ICD-10-CM

## 2022-12-19 DIAGNOSIS — R52 PAIN: Primary | ICD-10-CM

## 2022-12-19 PROCEDURE — 72110 XR LUMBAR SPINE AP AND LAT WITH FLEX/EXT: ICD-10-PCS | Mod: 26,,, | Performed by: RADIOLOGY

## 2022-12-19 PROCEDURE — 72110 X-RAY EXAM L-2 SPINE 4/>VWS: CPT | Mod: 26,,, | Performed by: RADIOLOGY

## 2022-12-19 PROCEDURE — 1159F MED LIST DOCD IN RCRD: CPT | Mod: HCNC,CPTII,S$GLB, | Performed by: NURSE PRACTITIONER

## 2022-12-19 PROCEDURE — 3008F PR BODY MASS INDEX (BMI) DOCUMENTED: ICD-10-PCS | Mod: HCNC,CPTII,S$GLB, | Performed by: NURSE PRACTITIONER

## 2022-12-19 PROCEDURE — 99999 PR PBB SHADOW E&M-EST. PATIENT-LVL III: CPT | Mod: PBBFAC,HCNC,, | Performed by: NURSE PRACTITIONER

## 2022-12-19 PROCEDURE — 99213 OFFICE O/P EST LOW 20 MIN: CPT | Mod: PBBFAC,HCNC | Performed by: NURSE PRACTITIONER

## 2022-12-19 PROCEDURE — 1101F PR PT FALLS ASSESS DOC 0-1 FALLS W/OUT INJ PAST YR: ICD-10-PCS | Mod: HCNC,CPTII,S$GLB, | Performed by: NURSE PRACTITIONER

## 2022-12-19 PROCEDURE — 72110 X-RAY EXAM L-2 SPINE 4/>VWS: CPT | Mod: TC

## 2022-12-19 PROCEDURE — 3288F FALL RISK ASSESSMENT DOCD: CPT | Mod: HCNC,CPTII,S$GLB, | Performed by: NURSE PRACTITIONER

## 2022-12-19 PROCEDURE — 73521 XR HIPS BILATERAL 2 VIEW INCL AP PELVIS: ICD-10-PCS | Mod: 26,,, | Performed by: RADIOLOGY

## 2022-12-19 PROCEDURE — 3288F PR FALLS RISK ASSESSMENT DOCUMENTED: ICD-10-PCS | Mod: HCNC,CPTII,S$GLB, | Performed by: NURSE PRACTITIONER

## 2022-12-19 PROCEDURE — 1160F RVW MEDS BY RX/DR IN RCRD: CPT | Mod: HCNC,CPTII,S$GLB, | Performed by: NURSE PRACTITIONER

## 2022-12-19 PROCEDURE — 73521 X-RAY EXAM HIPS BI 2 VIEWS: CPT | Mod: TC

## 2022-12-19 PROCEDURE — 1101F PT FALLS ASSESS-DOCD LE1/YR: CPT | Mod: HCNC,CPTII,S$GLB, | Performed by: NURSE PRACTITIONER

## 2022-12-19 PROCEDURE — 1159F PR MEDICATION LIST DOCUMENTED IN MEDICAL RECORD: ICD-10-PCS | Mod: HCNC,CPTII,S$GLB, | Performed by: NURSE PRACTITIONER

## 2022-12-19 PROCEDURE — 73521 X-RAY EXAM HIPS BI 2 VIEWS: CPT | Mod: 26,,, | Performed by: RADIOLOGY

## 2022-12-19 PROCEDURE — 99204 OFFICE O/P NEW MOD 45 MIN: CPT | Mod: HCNC,S$GLB,, | Performed by: NURSE PRACTITIONER

## 2022-12-19 PROCEDURE — 99204 PR OFFICE/OUTPT VISIT, NEW, LEVL IV, 45-59 MIN: ICD-10-PCS | Mod: HCNC,S$GLB,, | Performed by: NURSE PRACTITIONER

## 2022-12-19 PROCEDURE — 1125F AMNT PAIN NOTED PAIN PRSNT: CPT | Mod: HCNC,CPTII,S$GLB, | Performed by: NURSE PRACTITIONER

## 2022-12-19 PROCEDURE — 3008F BODY MASS INDEX DOCD: CPT | Mod: HCNC,CPTII,S$GLB, | Performed by: NURSE PRACTITIONER

## 2022-12-19 PROCEDURE — 99999 PR PBB SHADOW E&M-EST. PATIENT-LVL III: ICD-10-PCS | Mod: PBBFAC,HCNC,, | Performed by: NURSE PRACTITIONER

## 2022-12-19 PROCEDURE — 1125F PR PAIN SEVERITY QUANTIFIED, PAIN PRESENT: ICD-10-PCS | Mod: HCNC,CPTII,S$GLB, | Performed by: NURSE PRACTITIONER

## 2022-12-19 PROCEDURE — 1160F PR REVIEW ALL MEDS BY PRESCRIBER/CLIN PHARMACIST DOCUMENTED: ICD-10-PCS | Mod: HCNC,CPTII,S$GLB, | Performed by: NURSE PRACTITIONER

## 2022-12-19 RX ORDER — METHYLPREDNISOLONE 4 MG/1
TABLET ORAL
Qty: 1 EACH | Refills: 0 | Status: SHIPPED | OUTPATIENT
Start: 2022-12-19 | End: 2023-01-09

## 2022-12-19 NOTE — PROGRESS NOTES
SUBJECTIVE:     Chief Complaint & History of Present Illness:  Kalina Ivan is a New 67 y.o. year old female patient presenting today for constant bilateral hip pain which started back in Feb 2022.  There is not a history of trauma.  The pain is located in the lateral aspect of the hip.  The pain is described as achy, 8/10.  It is is aggravated by walking and radiates to down the sides of her legs to mid thigh .  There is radiation into the back and leg.  Previous treatments include acetaminophen, rest, and Physical Therapy which have provided minimal relief.  There is not a history of previous injury or surgery to the hip.  The patient does not use an assistive device.      Past Medical History:   Diagnosis Date    Hypothyroidism        No past surgical history on file.    Family History   Problem Relation Age of Onset    Heart disease Mother     Cancer Father         Luekemia        Review of patient's allergies indicates:   Allergen Reactions    Hazelnut Shortness Of Breath         Current Outpatient Medications:     alendronate (FOSAMAX) 70 MG tablet, Take 1 tablet (70 mg total) by mouth every 7 days., Disp: 12 tablet, Rfl: 1    calcium carbonate-vit D3-min 600 mg calcium- 400 unit Tab, Take 1 tablet by mouth 2 (two) times daily., Disp: 180 tablet, Rfl: 3    doxycycline (VIBRAMYCIN) 100 MG Cap, Take 1 capsule (100 mg total) by mouth every 12 (twelve) hours. for 7 days, Disp: 14 capsule, Rfl: 0    nirmatrelvir-ritonavir 300 mg (150 mg x 2)-100 mg copackaged tablets (EUA), Take 3 tablets by mouth 2 (two) times daily for 5 days. Each dose contains 2 nirmatrelvir (pink tablets) and 1 ritonavir (white tablet). Take all 3 tablets together, Disp: 30 tablet, Rfl: 0    Johnathon's wort 300 mg Cap, Take 300 mg by mouth 3 (three) times daily with meals., Disp: 90 each, Rfl: 11    thyroid, pork, (ARMOUR THYROID) 15 mg Tab, Take 3 tablets (45 mg total) by mouth before breakfast., Disp: 270 tablet, Rfl: 3     "methylPREDNISolone (MEDROL DOSEPACK) 4 mg tablet, use as directed, Disp: 1 each, Rfl: 0    Review of Systems:  ROS:  Constitutional: no fever or chills  Eyes: no visual changes  ENT: no nasal congestion or sore throat  Respiratory: no cough or shortness of breath  Cardiovascular: no chest pain or palpitations  Gastrointestinal: no nausea or vomiting, tolerating diet  Genitourinary: no hematuria or dysuria  Integument/Breast: no rash or pruritis  Hematologic/Lymphatic: no easy bruising or lymphadenopathy  Musculoskeletal: positive for arthralgias  Neurological: no seizures or tremors  Behavioral/Psych: no auditory or visual hallucinations  Endocrine: no heat or cold intolerance      PE:  Ht 5' 4" (1.626 m)   Wt 56 kg (123 lb 7.3 oz)   BMI 21.19 kg/m²   Estimated body mass index is 21.19 kg/m² as calculated from the following:    Height as of this encounter: 5' 4" (1.626 m).    Weight as of this encounter: 56 kg (123 lb 7.3 oz).   General: Pleasant, cooperative, NAD   HEENT: NCAT, sclera nonicteric   Lungs: Respirations are equal and unlabored.   Abdomen: Soft and non-tender.  CV: 2+ bilateral upper and lower extremity pulses.   Skin: Intact throughout LE with no rashes, erythema, or lesions  Extremities: No LE edema, NVI lower extremities      Hip Exam:   bilateralpositives: tenderness over greater trochanter and Tenderness down IT band and negatives: FROM  no pain with heel impact  pulses full    130 degrees flexion  -5 degrees extension   45 degrees internal rotation  45 degrees external rotation  25 degrees abduction  25 degrees adduction     RADIOGRAPHS:  Xray of LS spine and pelvis was obtained, findings show no acute fractures.  Patient had a MRI pelvis on 10-10-22 and LS on 8-31-22.  Per report, there were no fractures.  LS MRI shows the following:  T12-L1: A broad-based disc bulge is present that does not result in significant spinal canal or neural foraminal stenosis.     L1-L2: Bilateral ligamentum flavum " hypertrophy without spinal canal or neural foraminal stenosis.     L2-L3: Broad-based disc bulge and ligamentum flavum hypertrophy that does not result in significant spinal canal or neural foraminal stenosis.     L3-L4: Broad-based disc bulge and bilateral ligamentum flavum hypertrophy that does not result in spinal canal or neural foraminal stenosis     L4-L5: Broad-based disc bulge and ligamentum flavum hypertrophy that results in mild left neural foraminal stenosis and contact of the traversing nerve roots bilaterally (series 7, image 30).     L5-S1: There is no significant spinal canal or foraminal stenosis.     Imaged sacroiliac joints: Normal    MRI of Pelvis shows:  1. Mild right gluteus minimus tendinosis and peritendinitis.  2. Mild right hamstring tendinosis with mild subcortical edema at the ischial tuberosity.    All radiographs were personally reviewed by me.    ASSESSMENT/PLAN:       ICD-10-CM ICD-9-CM   1. Trochanteric bursitis of both hips  M70.61 726.5    M70.62    2. Gluteal tendinitis, unspecified laterality  M76.00 726.5   3. Hamstring tendonitis  M76.899 727.09   4. Iliotibial band syndrome, unspecified laterality  M76.30 728.89       -Kalina Ivan presents to clinic today with c/c bilateral hip pain for the past 10 months  -X-ray and MRI findings as above.  -Recommend RICE therapy.  -Advised patient pain likely coming from her back but she likely has a component of Trochanteric bursitis with IT band syndrome.  There I will give her a medrol dose pack and told her if her symptoms fail to improve, she will message me and I will get her in with back and spine.

## 2022-12-20 ENCOUNTER — OFFICE VISIT (OUTPATIENT)
Dept: PRIMARY CARE CLINIC | Facility: CLINIC | Age: 67
End: 2022-12-20
Payer: MEDICARE

## 2022-12-20 VITALS
HEIGHT: 64 IN | SYSTOLIC BLOOD PRESSURE: 102 MMHG | HEART RATE: 78 BPM | OXYGEN SATURATION: 98 % | TEMPERATURE: 97 F | DIASTOLIC BLOOD PRESSURE: 72 MMHG | BODY MASS INDEX: 20.79 KG/M2 | RESPIRATION RATE: 18 BRPM | WEIGHT: 121.81 LBS

## 2022-12-20 DIAGNOSIS — G89.29 CHRONIC LOW BACK PAIN WITH RIGHT-SIDED SCIATICA, UNSPECIFIED BACK PAIN LATERALITY: Primary | ICD-10-CM

## 2022-12-20 DIAGNOSIS — M54.41 CHRONIC LOW BACK PAIN WITH RIGHT-SIDED SCIATICA, UNSPECIFIED BACK PAIN LATERALITY: Primary | ICD-10-CM

## 2022-12-20 DIAGNOSIS — F32.A DEPRESSION, UNSPECIFIED DEPRESSION TYPE: ICD-10-CM

## 2022-12-20 PROCEDURE — 3078F DIAST BP <80 MM HG: CPT | Mod: CPTII,S$GLB,, | Performed by: STUDENT IN AN ORGANIZED HEALTH CARE EDUCATION/TRAINING PROGRAM

## 2022-12-20 PROCEDURE — 3288F PR FALLS RISK ASSESSMENT DOCUMENTED: ICD-10-PCS | Mod: CPTII,S$GLB,, | Performed by: STUDENT IN AN ORGANIZED HEALTH CARE EDUCATION/TRAINING PROGRAM

## 2022-12-20 PROCEDURE — 99999 PR PBB SHADOW E&M-EST. PATIENT-LVL III: CPT | Mod: PBBFAC,,, | Performed by: STUDENT IN AN ORGANIZED HEALTH CARE EDUCATION/TRAINING PROGRAM

## 2022-12-20 PROCEDURE — 1126F PR PAIN SEVERITY QUANTIFIED, NO PAIN PRESENT: ICD-10-PCS | Mod: CPTII,S$GLB,, | Performed by: STUDENT IN AN ORGANIZED HEALTH CARE EDUCATION/TRAINING PROGRAM

## 2022-12-20 PROCEDURE — 3008F PR BODY MASS INDEX (BMI) DOCUMENTED: ICD-10-PCS | Mod: CPTII,S$GLB,, | Performed by: STUDENT IN AN ORGANIZED HEALTH CARE EDUCATION/TRAINING PROGRAM

## 2022-12-20 PROCEDURE — 1101F PR PT FALLS ASSESS DOC 0-1 FALLS W/OUT INJ PAST YR: ICD-10-PCS | Mod: CPTII,S$GLB,, | Performed by: STUDENT IN AN ORGANIZED HEALTH CARE EDUCATION/TRAINING PROGRAM

## 2022-12-20 PROCEDURE — 1160F PR REVIEW ALL MEDS BY PRESCRIBER/CLIN PHARMACIST DOCUMENTED: ICD-10-PCS | Mod: CPTII,S$GLB,, | Performed by: STUDENT IN AN ORGANIZED HEALTH CARE EDUCATION/TRAINING PROGRAM

## 2022-12-20 PROCEDURE — 1159F MED LIST DOCD IN RCRD: CPT | Mod: CPTII,S$GLB,, | Performed by: STUDENT IN AN ORGANIZED HEALTH CARE EDUCATION/TRAINING PROGRAM

## 2022-12-20 PROCEDURE — 3074F SYST BP LT 130 MM HG: CPT | Mod: CPTII,S$GLB,, | Performed by: STUDENT IN AN ORGANIZED HEALTH CARE EDUCATION/TRAINING PROGRAM

## 2022-12-20 PROCEDURE — 99213 PR OFFICE/OUTPT VISIT, EST, LEVL III, 20-29 MIN: ICD-10-PCS | Mod: S$GLB,,, | Performed by: STUDENT IN AN ORGANIZED HEALTH CARE EDUCATION/TRAINING PROGRAM

## 2022-12-20 PROCEDURE — 3078F PR MOST RECENT DIASTOLIC BLOOD PRESSURE < 80 MM HG: ICD-10-PCS | Mod: CPTII,S$GLB,, | Performed by: STUDENT IN AN ORGANIZED HEALTH CARE EDUCATION/TRAINING PROGRAM

## 2022-12-20 PROCEDURE — 99999 PR PBB SHADOW E&M-EST. PATIENT-LVL III: ICD-10-PCS | Mod: PBBFAC,,, | Performed by: STUDENT IN AN ORGANIZED HEALTH CARE EDUCATION/TRAINING PROGRAM

## 2022-12-20 PROCEDURE — 3008F BODY MASS INDEX DOCD: CPT | Mod: CPTII,S$GLB,, | Performed by: STUDENT IN AN ORGANIZED HEALTH CARE EDUCATION/TRAINING PROGRAM

## 2022-12-20 PROCEDURE — 1126F AMNT PAIN NOTED NONE PRSNT: CPT | Mod: CPTII,S$GLB,, | Performed by: STUDENT IN AN ORGANIZED HEALTH CARE EDUCATION/TRAINING PROGRAM

## 2022-12-20 PROCEDURE — 3074F PR MOST RECENT SYSTOLIC BLOOD PRESSURE < 130 MM HG: ICD-10-PCS | Mod: CPTII,S$GLB,, | Performed by: STUDENT IN AN ORGANIZED HEALTH CARE EDUCATION/TRAINING PROGRAM

## 2022-12-20 PROCEDURE — 3288F FALL RISK ASSESSMENT DOCD: CPT | Mod: CPTII,S$GLB,, | Performed by: STUDENT IN AN ORGANIZED HEALTH CARE EDUCATION/TRAINING PROGRAM

## 2022-12-20 PROCEDURE — 1101F PT FALLS ASSESS-DOCD LE1/YR: CPT | Mod: CPTII,S$GLB,, | Performed by: STUDENT IN AN ORGANIZED HEALTH CARE EDUCATION/TRAINING PROGRAM

## 2022-12-20 PROCEDURE — 99213 OFFICE O/P EST LOW 20 MIN: CPT | Mod: S$GLB,,, | Performed by: STUDENT IN AN ORGANIZED HEALTH CARE EDUCATION/TRAINING PROGRAM

## 2022-12-20 PROCEDURE — 1160F RVW MEDS BY RX/DR IN RCRD: CPT | Mod: CPTII,S$GLB,, | Performed by: STUDENT IN AN ORGANIZED HEALTH CARE EDUCATION/TRAINING PROGRAM

## 2022-12-20 PROCEDURE — 1159F PR MEDICATION LIST DOCUMENTED IN MEDICAL RECORD: ICD-10-PCS | Mod: CPTII,S$GLB,, | Performed by: STUDENT IN AN ORGANIZED HEALTH CARE EDUCATION/TRAINING PROGRAM

## 2022-12-20 RX ORDER — ST. JOHN'S WORT 300 MG
300 CAPSULE ORAL
Qty: 90 EACH | Refills: 11 | Status: SHIPPED | OUTPATIENT
Start: 2022-12-20 | End: 2023-05-17

## 2022-12-20 RX ORDER — MULTIVITAMIN
1 TABLET ORAL 2 TIMES DAILY
Status: ON HOLD | COMMUNITY
Start: 2022-10-24 | End: 2023-03-29 | Stop reason: HOSPADM

## 2022-12-20 RX ORDER — VALACYCLOVIR HYDROCHLORIDE 1 G/1
TABLET, FILM COATED ORAL
COMMUNITY
Start: 2022-09-07 | End: 2024-03-07

## 2022-12-20 NOTE — PROGRESS NOTES
"Subjective:       Patient ID: Kalina Ivan is a 67 y.o. female.    Chief Complaint: Follow-up (6 month f/u )      HPI:  67 y.o. female presents to [facility] with follow-up for recent COVID infection.    Still having some fogginess and fatigue. Still having some cough at this time, but improved. Chest does feel fine at this time.    No new concerns at today's visit.    Saw Orthopaedic specialist 12/19/2022 who feels that patient does have tendonitis. Steroid was recommended.     With musculoskeletal pain is holding off on alendronate, is still taking calcium + Vit D    Review of Systems   Constitutional:  Positive for fatigue and fever (subjective, waxes and wanes before hot and cold). Negative for chills and diaphoresis.   HENT:  Positive for congestion (nasal). Negative for sinus pressure and sore throat.         Does have some discomfort in throat, not sore   Respiratory:  Positive for cough (Improved, non-productive). Negative for shortness of breath.    Cardiovascular:  Negative for chest pain and palpitations.   Gastrointestinal:  Positive for diarrhea and nausea (mild this morning). Negative for abdominal pain and vomiting.   Skin:  Negative for rash and wound.     Objective:      Vitals:    12/20/22 1037   BP: 102/72   BP Location: Right arm   Patient Position: Sitting   BP Method: Medium (Manual)   Pulse: 78   Resp: 18   Temp: 97.3 °F (36.3 °C)   TempSrc: Temporal   SpO2: 98%   Weight: 55.3 kg (121 lb 12.9 oz)   Height: 5' 4" (1.626 m)     Physical Exam  Vitals reviewed.   Constitutional:       General: She is not in acute distress.     Appearance: Normal appearance. She is not ill-appearing.   HENT:      Head: Normocephalic and atraumatic.   Eyes:      General:         Right eye: No discharge.         Left eye: No discharge.      Conjunctiva/sclera: Conjunctivae normal.   Cardiovascular:      Rate and Rhythm: Normal rate.   Pulmonary:      Effort: Pulmonary effort is normal.   Musculoskeletal:         " General: No deformity.   Skin:     Coloration: Skin is not jaundiced or pale.   Neurological:      General: No focal deficit present.      Mental Status: She is alert and oriented to person, place, and time.   Psychiatric:         Mood and Affect: Mood normal.         Behavior: Behavior normal.           Lab Results   Component Value Date     12/13/2022    K 4.1 12/13/2022     12/13/2022    CO2 25 12/13/2022    BUN 13 12/13/2022    CREATININE 0.8 12/13/2022    ANIONGAP 11 12/13/2022     Lab Results   Component Value Date    HGBA1C 5.4 08/15/2022     No results found for: BNP, BNPTRIAGEBLO    Lab Results   Component Value Date    WBC 7.60 12/13/2022    HGB 13.8 12/13/2022    HCT 41.9 12/13/2022     12/13/2022    GRAN 5.6 12/13/2022    GRAN 73.7 (H) 12/13/2022     Lab Results   Component Value Date    CHOL 237 (H) 06/21/2022    HDL 67 06/21/2022    LDLCALC 146.6 06/21/2022    TRIG 117 06/21/2022          Current Outpatient Medications:     calcium carbonate-vit D3-min 600 mg calcium- 400 unit Tab, Take 1 tablet by mouth 2 (two) times daily., Disp: 180 tablet, Rfl: 3    thyroid, pork, (ARMOUR THYROID) 15 mg Tab, Take 3 tablets (45 mg total) by mouth before breakfast., Disp: 270 tablet, Rfl: 3    alendronate (FOSAMAX) 70 MG tablet, Take 1 tablet (70 mg total) by mouth every 7 days. (Patient not taking: Reported on 12/20/2022), Disp: 12 tablet, Rfl: 1    calcium-vitamin D 600 mg-10 mcg (400 unit) Tab, Take 1 tablet by mouth 2 (two) times daily., Disp: , Rfl:     doxycycline (VIBRAMYCIN) 100 MG Cap, Take 1 capsule (100 mg total) by mouth every 12 (twelve) hours. for 7 days (Patient not taking: Reported on 12/20/2022), Disp: 14 capsule, Rfl: 0    methylPREDNISolone (MEDROL DOSEPACK) 4 mg tablet, use as directed (Patient not taking: Reported on 12/20/2022), Disp: 1 each, Rfl: 0    [place]'s wort 300 mg Cap, Take 300 mg by mouth 3 (three) times daily with meals., Disp: 90 each, Rfl: 11    valACYclovir  (VALTREX) 1000 MG tablet, FOR INTIAL OUTBREAK: TAKE ONE PILL TWICE DAILY X 7 DAYS., Disp: , Rfl:         Assessment:       1. Chronic low back pain with right-sided sciatica, unspecified back pain laterality    2. Depression, unspecified depression type           Plan:       Chronic low back pain with right-sided sciatica, unspecified back pain laterality  Depression, unspecified depression type  -     [place]'s wort 300 mg Cap; Take 300 mg by mouth 3 (three) times daily with meals.  Dispense: 90 each; Refill: 11  - Will continue to hold alendronate with musculoskeletal pain at this time until symptoms resolve    RTC in 1 month

## 2023-01-17 ENCOUNTER — OFFICE VISIT (OUTPATIENT)
Dept: PRIMARY CARE CLINIC | Facility: CLINIC | Age: 68
End: 2023-01-17
Payer: MEDICARE

## 2023-01-17 VITALS
OXYGEN SATURATION: 97 % | BODY MASS INDEX: 20.92 KG/M2 | WEIGHT: 122.56 LBS | DIASTOLIC BLOOD PRESSURE: 68 MMHG | HEIGHT: 64 IN | RESPIRATION RATE: 18 BRPM | HEART RATE: 84 BPM | SYSTOLIC BLOOD PRESSURE: 108 MMHG | TEMPERATURE: 98 F

## 2023-01-17 DIAGNOSIS — Z23 NEED FOR VACCINATION: ICD-10-CM

## 2023-01-17 DIAGNOSIS — Z87.19 HISTORY OF ESOPHAGEAL REFLUX: ICD-10-CM

## 2023-01-17 DIAGNOSIS — M54.41 CHRONIC LOW BACK PAIN WITH RIGHT-SIDED SCIATICA, UNSPECIFIED BACK PAIN LATERALITY: Primary | ICD-10-CM

## 2023-01-17 DIAGNOSIS — R05.3 CHRONIC COUGH: ICD-10-CM

## 2023-01-17 DIAGNOSIS — G89.29 CHRONIC LOW BACK PAIN WITH RIGHT-SIDED SCIATICA, UNSPECIFIED BACK PAIN LATERALITY: Primary | ICD-10-CM

## 2023-01-17 DIAGNOSIS — E03.9 HYPOTHYROIDISM, UNSPECIFIED TYPE: ICD-10-CM

## 2023-01-17 PROCEDURE — 99999 PR PBB SHADOW E&M-EST. PATIENT-LVL IV: ICD-10-PCS | Mod: PBBFAC,HCNC,, | Performed by: STUDENT IN AN ORGANIZED HEALTH CARE EDUCATION/TRAINING PROGRAM

## 2023-01-17 PROCEDURE — 3008F BODY MASS INDEX DOCD: CPT | Mod: HCNC,CPTII,S$GLB, | Performed by: STUDENT IN AN ORGANIZED HEALTH CARE EDUCATION/TRAINING PROGRAM

## 2023-01-17 PROCEDURE — 3078F DIAST BP <80 MM HG: CPT | Mod: HCNC,CPTII,S$GLB, | Performed by: STUDENT IN AN ORGANIZED HEALTH CARE EDUCATION/TRAINING PROGRAM

## 2023-01-17 PROCEDURE — G0009 ADMIN PNEUMOCOCCAL VACCINE: HCPCS | Mod: HCNC,S$GLB,, | Performed by: STUDENT IN AN ORGANIZED HEALTH CARE EDUCATION/TRAINING PROGRAM

## 2023-01-17 PROCEDURE — 3074F SYST BP LT 130 MM HG: CPT | Mod: HCNC,CPTII,S$GLB, | Performed by: STUDENT IN AN ORGANIZED HEALTH CARE EDUCATION/TRAINING PROGRAM

## 2023-01-17 PROCEDURE — 99214 OFFICE O/P EST MOD 30 MIN: CPT | Mod: HCNC,S$GLB,, | Performed by: STUDENT IN AN ORGANIZED HEALTH CARE EDUCATION/TRAINING PROGRAM

## 2023-01-17 PROCEDURE — 3008F PR BODY MASS INDEX (BMI) DOCUMENTED: ICD-10-PCS | Mod: HCNC,CPTII,S$GLB, | Performed by: STUDENT IN AN ORGANIZED HEALTH CARE EDUCATION/TRAINING PROGRAM

## 2023-01-17 PROCEDURE — 90677 PNEUMOCOCCAL CONJUGATE VACCINE 20-VALENT: ICD-10-PCS | Mod: HCNC,S$GLB,, | Performed by: STUDENT IN AN ORGANIZED HEALTH CARE EDUCATION/TRAINING PROGRAM

## 2023-01-17 PROCEDURE — 90677 PCV20 VACCINE IM: CPT | Mod: HCNC,S$GLB,, | Performed by: STUDENT IN AN ORGANIZED HEALTH CARE EDUCATION/TRAINING PROGRAM

## 2023-01-17 PROCEDURE — 3078F PR MOST RECENT DIASTOLIC BLOOD PRESSURE < 80 MM HG: ICD-10-PCS | Mod: HCNC,CPTII,S$GLB, | Performed by: STUDENT IN AN ORGANIZED HEALTH CARE EDUCATION/TRAINING PROGRAM

## 2023-01-17 PROCEDURE — 1101F PT FALLS ASSESS-DOCD LE1/YR: CPT | Mod: HCNC,CPTII,S$GLB, | Performed by: STUDENT IN AN ORGANIZED HEALTH CARE EDUCATION/TRAINING PROGRAM

## 2023-01-17 PROCEDURE — 1126F AMNT PAIN NOTED NONE PRSNT: CPT | Mod: HCNC,CPTII,S$GLB, | Performed by: STUDENT IN AN ORGANIZED HEALTH CARE EDUCATION/TRAINING PROGRAM

## 2023-01-17 PROCEDURE — 3288F PR FALLS RISK ASSESSMENT DOCUMENTED: ICD-10-PCS | Mod: HCNC,CPTII,S$GLB, | Performed by: STUDENT IN AN ORGANIZED HEALTH CARE EDUCATION/TRAINING PROGRAM

## 2023-01-17 PROCEDURE — 1159F MED LIST DOCD IN RCRD: CPT | Mod: HCNC,CPTII,S$GLB, | Performed by: STUDENT IN AN ORGANIZED HEALTH CARE EDUCATION/TRAINING PROGRAM

## 2023-01-17 PROCEDURE — 3074F PR MOST RECENT SYSTOLIC BLOOD PRESSURE < 130 MM HG: ICD-10-PCS | Mod: HCNC,CPTII,S$GLB, | Performed by: STUDENT IN AN ORGANIZED HEALTH CARE EDUCATION/TRAINING PROGRAM

## 2023-01-17 PROCEDURE — 99999 PR PBB SHADOW E&M-EST. PATIENT-LVL IV: CPT | Mod: PBBFAC,HCNC,, | Performed by: STUDENT IN AN ORGANIZED HEALTH CARE EDUCATION/TRAINING PROGRAM

## 2023-01-17 PROCEDURE — 1160F PR REVIEW ALL MEDS BY PRESCRIBER/CLIN PHARMACIST DOCUMENTED: ICD-10-PCS | Mod: HCNC,CPTII,S$GLB, | Performed by: STUDENT IN AN ORGANIZED HEALTH CARE EDUCATION/TRAINING PROGRAM

## 2023-01-17 PROCEDURE — 1160F RVW MEDS BY RX/DR IN RCRD: CPT | Mod: HCNC,CPTII,S$GLB, | Performed by: STUDENT IN AN ORGANIZED HEALTH CARE EDUCATION/TRAINING PROGRAM

## 2023-01-17 PROCEDURE — 3288F FALL RISK ASSESSMENT DOCD: CPT | Mod: HCNC,CPTII,S$GLB, | Performed by: STUDENT IN AN ORGANIZED HEALTH CARE EDUCATION/TRAINING PROGRAM

## 2023-01-17 PROCEDURE — G0009 PNEUMOCOCCAL CONJUGATE VACCINE 20-VALENT: ICD-10-PCS | Mod: HCNC,S$GLB,, | Performed by: STUDENT IN AN ORGANIZED HEALTH CARE EDUCATION/TRAINING PROGRAM

## 2023-01-17 PROCEDURE — 1101F PR PT FALLS ASSESS DOC 0-1 FALLS W/OUT INJ PAST YR: ICD-10-PCS | Mod: HCNC,CPTII,S$GLB, | Performed by: STUDENT IN AN ORGANIZED HEALTH CARE EDUCATION/TRAINING PROGRAM

## 2023-01-17 PROCEDURE — 99214 PR OFFICE/OUTPT VISIT, EST, LEVL IV, 30-39 MIN: ICD-10-PCS | Mod: HCNC,S$GLB,, | Performed by: STUDENT IN AN ORGANIZED HEALTH CARE EDUCATION/TRAINING PROGRAM

## 2023-01-17 PROCEDURE — 1126F PR PAIN SEVERITY QUANTIFIED, NO PAIN PRESENT: ICD-10-PCS | Mod: HCNC,CPTII,S$GLB, | Performed by: STUDENT IN AN ORGANIZED HEALTH CARE EDUCATION/TRAINING PROGRAM

## 2023-01-17 PROCEDURE — 1159F PR MEDICATION LIST DOCUMENTED IN MEDICAL RECORD: ICD-10-PCS | Mod: HCNC,CPTII,S$GLB, | Performed by: STUDENT IN AN ORGANIZED HEALTH CARE EDUCATION/TRAINING PROGRAM

## 2023-01-17 RX ORDER — PANTOPRAZOLE SODIUM 40 MG/1
40 TABLET, DELAYED RELEASE ORAL DAILY
Qty: 30 TABLET | Refills: 2 | Status: SHIPPED | OUTPATIENT
Start: 2023-01-17 | End: 2023-04-17

## 2023-01-17 RX ORDER — THYROID 15 MG/1
45 TABLET ORAL
Qty: 270 TABLET | Refills: 3 | Status: SHIPPED | OUTPATIENT
Start: 2023-01-17 | End: 2024-02-05

## 2023-01-17 NOTE — PROGRESS NOTES
Identified patient by name and   Administered pneumo 20 vaccine to right deltoid using aseptic technique.

## 2023-01-17 NOTE — PROGRESS NOTES
"Subjective:       Patient ID: Kalina Ivan is a 68 y.o. female.    Chief Complaint: No chief complaint on file.      HPI:  68 y.o. female presents to [facility] here for follow-up visit for low back pain    Patient last seen 12/20/2022, discontinued alendroate at that time as possible culprit.    Today reports that Sports Medicine would like her to follow-up with Back Specialist as pain is now travelling into her bilateral thighs. Pain is milder than what it was in past prior to steroids. Now milder, but again travelling to bilateral thighs L > R.    Has been having continued dry cough since COVID. Is worse at night and in morning.    Lost a big contract in September 2022. Has been having some anxiety and loneliness. Does have good coping mechanisms.    Signed up for gym and in classes. 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 Does not have meditation practice.    Colonoscopy history?: Was at [facility] and 6/30/2021  Pneumococcal vaccine?: Amenable    Review of Systems   Constitutional:  Positive for fatigue. Negative for chills, diaphoresis, fever and unexpected weight change.   Respiratory:  Positive for cough (worse at night and early morning). Negative for shortness of breath.    Cardiovascular:  Negative for chest pain and palpitations.   Gastrointestinal:  Negative for abdominal pain, diarrhea, nausea and vomiting.   Musculoskeletal:  Positive for back pain. Negative for myalgias.   Skin:  Negative for rash and wound.   Neurological:  Negative for weakness and numbness.     Objective:      Vitals:    01/17/23 1338   BP: 108/68   BP Location: Left arm   Patient Position: Sitting   BP Method: Medium (Manual)   Pulse: 84   Resp: 18   Temp: 97.7 °F (36.5 °C)   TempSrc: Skin   SpO2: 97%   Weight: 55.6 kg (122 lb 9.2 oz)   Height: 5' 4" (1.626 m)     Physical Exam  Vitals reviewed.   Constitutional:       General: She is not in acute distress.     Appearance: Normal appearance. She is not " ill-appearing.   HENT:      Head: Normocephalic and atraumatic.   Eyes:      General:         Right eye: No discharge.         Left eye: No discharge.      Conjunctiva/sclera: Conjunctivae normal.   Cardiovascular:      Rate and Rhythm: Normal rate and regular rhythm.      Pulses: Normal pulses.      Heart sounds: No murmur heard.  Pulmonary:      Effort: Pulmonary effort is normal.      Breath sounds: Normal breath sounds.   Musculoskeletal:         General: No deformity.   Skin:     General: Skin is warm and dry.      Coloration: Skin is not jaundiced.   Neurological:      General: No focal deficit present.      Mental Status: She is alert and oriented to person, place, and time.   Psychiatric:         Mood and Affect: Mood normal.         Behavior: Behavior normal.           Lab Results   Component Value Date     12/13/2022    K 4.1 12/13/2022     12/13/2022    CO2 25 12/13/2022    BUN 13 12/13/2022    CREATININE 0.8 12/13/2022    ANIONGAP 11 12/13/2022     Lab Results   Component Value Date    HGBA1C 5.4 08/15/2022     No results found for: BNP, BNPTRIAGEBLO    Lab Results   Component Value Date    WBC 7.60 12/13/2022    HGB 13.8 12/13/2022    HCT 41.9 12/13/2022     12/13/2022    GRAN 5.6 12/13/2022    GRAN 73.7 (H) 12/13/2022     Lab Results   Component Value Date    CHOL 237 (H) 06/21/2022    HDL 67 06/21/2022    LDLCALC 146.6 06/21/2022    TRIG 117 06/21/2022          Current Outpatient Medications:     calcium carbonate-vit D3-min 600 mg calcium- 400 unit Tab, Take 1 tablet by mouth 2 (two) times daily., Disp: 180 tablet, Rfl: 3    calcium-vitamin D 600 mg-10 mcg (400 unit) Tab, Take 1 tablet by mouth 2 (two) times daily., Disp: , Rfl:     [place]'s wort 300 mg Cap, Take 300 mg by mouth 3 (three) times daily with meals., Disp: 90 each, Rfl: 11    valACYclovir (VALTREX) 1000 MG tablet, FOR INTIAL OUTBREAK: TAKE ONE PILL TWICE DAILY X 7 DAYS., Disp: , Rfl:     alendronate (FOSAMAX) 70 MG  tablet, Take 1 tablet (70 mg total) by mouth every 7 days. (Patient not taking: Reported on 12/20/2022), Disp: 12 tablet, Rfl: 1    pantoprazole (PROTONIX) 40 MG tablet, Take 1 tablet (40 mg total) by mouth once daily., Disp: 30 tablet, Rfl: 2    thyroid, pork, (ARMOUR THYROID) 15 mg Tab, Take 3 tablets (45 mg total) by mouth before breakfast., Disp: 270 tablet, Rfl: 3        Assessment:       1. Chronic low back pain with right-sided sciatica, unspecified back pain laterality    2. History of esophageal reflux    3. Chronic cough    4. Hypothyroidism, unspecified type    5. Need for vaccination           Plan:       Chronic low back pain with right-sided sciatica, unspecified back pain laterality  -     Ambulatory referral/consult to Back & Spine Clinic; Future; Expected date: 01/24/2023  - Appreciate Back and Spine recommendations    History of esophageal reflux  Chronic cough  -     pantoprazole (PROTONIX) 40 MG tablet; Take 1 tablet (40 mg total) by mouth once daily.  Dispense: 30 tablet; Refill: 2  - Will attempt 3 month trial of pantoprazole and assess for resolution    Hypothyroidism, unspecified type  -     thyroid, pork, (ARMOUR THYROID) 15 mg Tab; Take 3 tablets (45 mg total) by mouth before breakfast.  Dispense: 270 tablet; Refill: 3    Need for vaccination  -     (In Office Administered) Pneumococcal Conjugate Vaccine (20 Valent) (IM)    RTC in 3 months

## 2023-02-09 DIAGNOSIS — Z00.00 ENCOUNTER FOR MEDICARE ANNUAL WELLNESS EXAM: ICD-10-CM

## 2023-02-13 ENCOUNTER — OFFICE VISIT (OUTPATIENT)
Dept: ORTHOPEDICS | Facility: CLINIC | Age: 68
End: 2023-02-13
Payer: MEDICARE

## 2023-02-13 VITALS — BODY MASS INDEX: 21.43 KG/M2 | WEIGHT: 125.56 LBS | HEIGHT: 64 IN

## 2023-02-13 DIAGNOSIS — M51.36 DDD (DEGENERATIVE DISC DISEASE), LUMBAR: ICD-10-CM

## 2023-02-13 DIAGNOSIS — G89.29 CHRONIC LOW BACK PAIN WITH RIGHT-SIDED SCIATICA, UNSPECIFIED BACK PAIN LATERALITY: ICD-10-CM

## 2023-02-13 DIAGNOSIS — M70.62 TROCHANTERIC BURSITIS OF BOTH HIPS: Primary | ICD-10-CM

## 2023-02-13 DIAGNOSIS — M54.41 CHRONIC LOW BACK PAIN WITH RIGHT-SIDED SCIATICA, UNSPECIFIED BACK PAIN LATERALITY: ICD-10-CM

## 2023-02-13 DIAGNOSIS — M70.61 TROCHANTERIC BURSITIS OF BOTH HIPS: Primary | ICD-10-CM

## 2023-02-13 PROCEDURE — 1159F MED LIST DOCD IN RCRD: CPT | Mod: HCNC,CPTII,S$GLB, | Performed by: ORTHOPAEDIC SURGERY

## 2023-02-13 PROCEDURE — 1101F PR PT FALLS ASSESS DOC 0-1 FALLS W/OUT INJ PAST YR: ICD-10-PCS | Mod: HCNC,CPTII,S$GLB, | Performed by: ORTHOPAEDIC SURGERY

## 2023-02-13 PROCEDURE — 1125F PR PAIN SEVERITY QUANTIFIED, PAIN PRESENT: ICD-10-PCS | Mod: HCNC,CPTII,S$GLB, | Performed by: ORTHOPAEDIC SURGERY

## 2023-02-13 PROCEDURE — 1160F PR REVIEW ALL MEDS BY PRESCRIBER/CLIN PHARMACIST DOCUMENTED: ICD-10-PCS | Mod: HCNC,CPTII,S$GLB, | Performed by: ORTHOPAEDIC SURGERY

## 2023-02-13 PROCEDURE — 3288F PR FALLS RISK ASSESSMENT DOCUMENTED: ICD-10-PCS | Mod: HCNC,CPTII,S$GLB, | Performed by: ORTHOPAEDIC SURGERY

## 2023-02-13 PROCEDURE — 99213 PR OFFICE/OUTPT VISIT, EST, LEVL III, 20-29 MIN: ICD-10-PCS | Mod: HCNC,S$GLB,, | Performed by: ORTHOPAEDIC SURGERY

## 2023-02-13 PROCEDURE — 3008F PR BODY MASS INDEX (BMI) DOCUMENTED: ICD-10-PCS | Mod: HCNC,CPTII,S$GLB, | Performed by: ORTHOPAEDIC SURGERY

## 2023-02-13 PROCEDURE — 3008F BODY MASS INDEX DOCD: CPT | Mod: HCNC,CPTII,S$GLB, | Performed by: ORTHOPAEDIC SURGERY

## 2023-02-13 PROCEDURE — 99213 OFFICE O/P EST LOW 20 MIN: CPT | Mod: HCNC,S$GLB,, | Performed by: ORTHOPAEDIC SURGERY

## 2023-02-13 PROCEDURE — 3288F FALL RISK ASSESSMENT DOCD: CPT | Mod: HCNC,CPTII,S$GLB, | Performed by: ORTHOPAEDIC SURGERY

## 2023-02-13 PROCEDURE — 1159F PR MEDICATION LIST DOCUMENTED IN MEDICAL RECORD: ICD-10-PCS | Mod: HCNC,CPTII,S$GLB, | Performed by: ORTHOPAEDIC SURGERY

## 2023-02-13 PROCEDURE — 1125F AMNT PAIN NOTED PAIN PRSNT: CPT | Mod: HCNC,CPTII,S$GLB, | Performed by: ORTHOPAEDIC SURGERY

## 2023-02-13 PROCEDURE — 1160F RVW MEDS BY RX/DR IN RCRD: CPT | Mod: HCNC,CPTII,S$GLB, | Performed by: ORTHOPAEDIC SURGERY

## 2023-02-13 PROCEDURE — 99999 PR PBB SHADOW E&M-EST. PATIENT-LVL III: ICD-10-PCS | Mod: PBBFAC,HCNC,, | Performed by: ORTHOPAEDIC SURGERY

## 2023-02-13 PROCEDURE — 1101F PT FALLS ASSESS-DOCD LE1/YR: CPT | Mod: HCNC,CPTII,S$GLB, | Performed by: ORTHOPAEDIC SURGERY

## 2023-02-13 PROCEDURE — 99999 PR PBB SHADOW E&M-EST. PATIENT-LVL III: CPT | Mod: PBBFAC,HCNC,, | Performed by: ORTHOPAEDIC SURGERY

## 2023-02-13 NOTE — PROGRESS NOTES
DATE: 2/13/2023  PATIENT: Kalina Ivan    Attending Physician: Wild Sánchez M.D.    CHIEF COMPLAINT: LBP    HISTORY:  Kalina Ivan is a 68 y.o. female w/ a hx of osteoporosis (on foxamax) presents for initial evaluation of low back pain (Back - 5). The pain has been present for 20 years. The patient describes the pain as dull but it does not go down legs. She has bilateral lateral hip pain that radiates to the knees occasionally. She had L GT injection at [facility] a few years ago that helped.  The pain is worse with activity and improved by rest. There is no associated numbness and tingling. There is LLE subjective weakness. Prior treatments have included PT, but no JONATHAN or surgery.    She also complained of left shoulder pain and wants it evaluated by a specialist.    The Patient denies myelopathic symptoms such as handwriting changes or difficulty with buttons/coins/keys. Denies perineal paresthesias, bowel/bladder dysfunction.    The patient does not smoke, have DM or endorse IVDU. The patient is not on any blood thinners and does not take chronic narcotics. She works as a , writer and .    PAST MEDICAL/SURGICAL HISTORY:  Past Medical History:   Diagnosis Date    Hypothyroidism      History reviewed. No pertinent surgical history.    Current Medications:   Current Outpatient Medications:     alendronate (FOSAMAX) 70 MG tablet, Take 1 tablet (70 mg total) by mouth every 7 days., Disp: 12 tablet, Rfl: 1    calcium carbonate-vit D3-min 600 mg calcium- 400 unit Tab, Take 1 tablet by mouth 2 (two) times daily., Disp: 180 tablet, Rfl: 3    calcium-vitamin D 600 mg-10 mcg (400 unit) Tab, Take 1 tablet by mouth 2 (two) times daily., Disp: , Rfl:     pantoprazole (PROTONIX) 40 MG tablet, Take 1 tablet (40 mg total) by mouth once daily., Disp: 30 tablet, Rfl: 2    Sparks's wort 300 mg Cap, Take 300 mg by mouth 3 (three) times daily with meals., Disp: 90 each, Rfl: 11    thyroid, pork, (ARMOUR THYROID) 15 mg  "Tab, Take 3 tablets (45 mg total) by mouth before breakfast., Disp: 270 tablet, Rfl: 3    valACYclovir (VALTREX) 1000 MG tablet, FOR INTIAL OUTBREAK: TAKE ONE PILL TWICE DAILY X 7 DAYS., Disp: , Rfl:     Social History:   Social History     Socioeconomic History    Marital status:    Tobacco Use    Smoking status: Never    Smokeless tobacco: Never   Substance and Sexual Activity    Alcohol use: Yes     Comment: Occasionaly    Drug use: Not Currently    Sexual activity: Not Currently   Social History Narrative    ** Merged History Encounter **            REVIEW OF SYSTEMS:  Constitution: Negative. Negative for chills, fever and night sweats.   Cardiovascular: Negative for chest pain and syncope.   Respiratory: Negative for cough and shortness of breath.   Gastrointestinal: See HPI. Negative for nausea/vomiting. Negative for abdominal pain.  Genitourinary: See HPI. Negative for discoloration or dysuria.  Skin: Negative for dry skin, itching and rash.   Hematologic/Lymphatic: negative for bleeding/clotting disorders.   Musculoskeletal: Negative for falls and muscle weakness.   Neurological: See HPI. no history of seizures. no history of cranial surgery or shunts.  Endocrine: Negative for polydipsia, polyphagia and polyuria.   Allergic/Immunologic: Negative for hives and persistent infections.    PHYSICAL EXAMINATION:    Ht 5' 4" (1.626 m)   Wt 56.9 kg (125 lb 8.8 oz)   BMI 21.55 kg/m²     General: The patient is a 68 y.o. female in no apparent distress, the patient is orientatied to person, place and time.   Psych: Normal mood and affect  HEENT: Vision grossly intact, hearing intact to the spoken word.  Lungs: Respirations unlabored.  Gait: Normal station and gait, no difficulty with toe or heel walk.   Skin: Dorsal lumbar skin negative for rashes, lesions, hairy patches and surgical scars.  Range of motion: Lumbar range of motion is acceptable. There is no lumbar tenderness to palpation.  Spinal Balance: " Global saggital and coronal spinal balance acceptable, no significant for scoliosis and kyphosis.  Musculoskeletal: No pain with the range of motion of the bilateral hips. B/l trochanteric tenderness to palpation.  Vascular: Bilateral lower extremities warm and well perfused, Dorsalis pedis pulses 2+ bilaterally.  Neurological: Normal strength and tone in all major motor groups in the bilateral lower extremities except for 4/5 in left HF. Normal sensation to light touch in the L2-S1 dermatomes bilaterally.  Deep tendon reflexes symmetric 2+ in the bilateral lower extremities.  Negative Babinski bilaterally.    IMAGING:   Today I independently reviewed the following images and my interpretations are as follows:    AP, Lat and Flex/Ex upright L-spine films demonstrate lumbar spondylosis and DDD without listhesis.     Body mass index is 21.55 kg/m².  Hemoglobin A1C   Date Value Ref Range Status   08/15/2022 5.4 4.7 - 5.6 % Final   09/29/2021 5.3 4.7 - 5.6 % Final         ASSESSMENT/PLAN:    Kalina was seen today for back pain.    Diagnoses and all orders for this visit:    Trochanteric bursitis of both hips    Chronic low back pain with right-sided sciatica, unspecified back pain laterality  -     Ambulatory referral[facility]    DDD (degenerative disc disease), lumbar      Follow up if symptoms worsen or fail to improve.    Patient has lumbar spondylosis and b/l GT bursitis. I discussed the natural history of their diagnoses as well as surgical and nonsurgical treatment options. I educated the patient on the importance of core/back strengthening, correct posture, bending/lifting ergonomics, and low-impact aerobic exercises (walking, elliptical, and aquatherapy). Continue medications OTC. I will refer the patient to [facility] for b/l GT injections. I referred pt to Sports for left shoulder. Patient will follow up PRN. Next step is lumbar MRI.    Wild Sánchez MD  Orthopaedic Spine Surgeon  Department  of Orthopaedic Surgery  [phone]

## 2023-02-14 ENCOUNTER — PATIENT OUTREACH (OUTPATIENT)
Dept: ADMINISTRATIVE | Facility: HOSPITAL | Age: 68
End: 2023-02-14
Payer: MEDICARE

## 2023-02-14 NOTE — LETTER
AUTHORIZATION FOR RELEASE OF   CONFIDENTIAL INFORMATION    TO: [facility] Records,    We are seeing Kalina Ivan, date of birth 1955, in the clinic at [facility]. Grzegorz Bolton MD is the patient's PCP. Kalina Ivan has an outstanding lab/procedure at the time we reviewed her chart. In order to help keep her health information updated, she has authorized us to request the following medical record(s):        (  )  MAMMOGRAM                                      (  )  COLONOSCOPY      (  )  PAP SMEAR                                          (  )  OUTSIDE LAB RESULTS     (  )  DEXA SCAN                                          (  )  EYE EXAM            (  )  FOOT EXAM                                          (  )  ENTIRE RECORD     ( X ) IMMUNIZATIONS                                 (  )  _______________         Please fax records to [facility], Grzegorz Bolton MD, [phone]     If you have any questions, please contact Eusebia Cintron at [phone].           Patient Name: Kalina Ivan  : 1955  Patient Phone #: [phone]

## 2023-02-16 ENCOUNTER — OFFICE VISIT (OUTPATIENT)
Dept: SPORTS MEDICINE | Facility: CLINIC | Age: 68
End: 2023-02-16
Payer: MEDICARE

## 2023-02-16 ENCOUNTER — HOSPITAL ENCOUNTER (OUTPATIENT)
Dept: RADIOLOGY | Facility: HOSPITAL | Age: 68
Discharge: HOME | End: 2023-02-16
Attending: ORTHOPAEDIC SURGERY
Payer: MEDICARE

## 2023-02-16 VITALS
HEART RATE: 89 BPM | BODY MASS INDEX: 21.24 KG/M2 | SYSTOLIC BLOOD PRESSURE: 121 MMHG | HEIGHT: 64 IN | DIASTOLIC BLOOD PRESSURE: 83 MMHG | WEIGHT: 124.38 LBS

## 2023-02-16 DIAGNOSIS — M25.512 LEFT SHOULDER PAIN, UNSPECIFIED CHRONICITY: ICD-10-CM

## 2023-02-16 DIAGNOSIS — M54.2 CERVICALGIA: ICD-10-CM

## 2023-02-16 DIAGNOSIS — G25.89 SCAPULAR DYSKINESIS: Primary | ICD-10-CM

## 2023-02-16 DIAGNOSIS — G89.29 CHRONIC LEFT SHOULDER PAIN: ICD-10-CM

## 2023-02-16 DIAGNOSIS — M25.512 CHRONIC LEFT SHOULDER PAIN: ICD-10-CM

## 2023-02-16 PROCEDURE — 3079F PR MOST RECENT DIASTOLIC BLOOD PRESSURE 80-89 MM HG: ICD-10-PCS | Mod: HCNC,CPTII,S$GLB, | Performed by: ORTHOPAEDIC SURGERY

## 2023-02-16 PROCEDURE — 3288F FALL RISK ASSESSMENT DOCD: CPT | Mod: HCNC,CPTII,S$GLB, | Performed by: ORTHOPAEDIC SURGERY

## 2023-02-16 PROCEDURE — 73030 XR SHOULDER COMPLETE 2 OR MORE VIEWS LEFT: ICD-10-PCS | Mod: 26,HCNC,LT, | Performed by: RADIOLOGY

## 2023-02-16 PROCEDURE — 3008F PR BODY MASS INDEX (BMI) DOCUMENTED: ICD-10-PCS | Mod: HCNC,CPTII,S$GLB, | Performed by: ORTHOPAEDIC SURGERY

## 2023-02-16 PROCEDURE — 3079F DIAST BP 80-89 MM HG: CPT | Mod: HCNC,CPTII,S$GLB, | Performed by: ORTHOPAEDIC SURGERY

## 2023-02-16 PROCEDURE — 1125F AMNT PAIN NOTED PAIN PRSNT: CPT | Mod: HCNC,CPTII,S$GLB, | Performed by: ORTHOPAEDIC SURGERY

## 2023-02-16 PROCEDURE — 3074F SYST BP LT 130 MM HG: CPT | Mod: HCNC,CPTII,S$GLB, | Performed by: ORTHOPAEDIC SURGERY

## 2023-02-16 PROCEDURE — 73030 X-RAY EXAM OF SHOULDER: CPT | Mod: TC,HCNC,LT

## 2023-02-16 PROCEDURE — 99999 PR PBB SHADOW E&M-EST. PATIENT-LVL III: CPT | Mod: PBBFAC,HCNC,, | Performed by: ORTHOPAEDIC SURGERY

## 2023-02-16 PROCEDURE — 73030 X-RAY EXAM OF SHOULDER: CPT | Mod: 26,HCNC,LT, | Performed by: RADIOLOGY

## 2023-02-16 PROCEDURE — 99999 PR PBB SHADOW E&M-EST. PATIENT-LVL III: ICD-10-PCS | Mod: PBBFAC,HCNC,, | Performed by: ORTHOPAEDIC SURGERY

## 2023-02-16 PROCEDURE — 3288F PR FALLS RISK ASSESSMENT DOCUMENTED: ICD-10-PCS | Mod: HCNC,CPTII,S$GLB, | Performed by: ORTHOPAEDIC SURGERY

## 2023-02-16 PROCEDURE — 3074F PR MOST RECENT SYSTOLIC BLOOD PRESSURE < 130 MM HG: ICD-10-PCS | Mod: HCNC,CPTII,S$GLB, | Performed by: ORTHOPAEDIC SURGERY

## 2023-02-16 PROCEDURE — 1125F PR PAIN SEVERITY QUANTIFIED, PAIN PRESENT: ICD-10-PCS | Mod: HCNC,CPTII,S$GLB, | Performed by: ORTHOPAEDIC SURGERY

## 2023-02-16 PROCEDURE — 1101F PT FALLS ASSESS-DOCD LE1/YR: CPT | Mod: HCNC,CPTII,S$GLB, | Performed by: ORTHOPAEDIC SURGERY

## 2023-02-16 PROCEDURE — 99204 OFFICE O/P NEW MOD 45 MIN: CPT | Mod: HCNC,S$GLB,, | Performed by: ORTHOPAEDIC SURGERY

## 2023-02-16 PROCEDURE — 1101F PR PT FALLS ASSESS DOC 0-1 FALLS W/OUT INJ PAST YR: ICD-10-PCS | Mod: HCNC,CPTII,S$GLB, | Performed by: ORTHOPAEDIC SURGERY

## 2023-02-16 PROCEDURE — 99204 PR OFFICE/OUTPT VISIT, NEW, LEVL IV, 45-59 MIN: ICD-10-PCS | Mod: HCNC,S$GLB,, | Performed by: ORTHOPAEDIC SURGERY

## 2023-02-16 PROCEDURE — 3008F BODY MASS INDEX DOCD: CPT | Mod: HCNC,CPTII,S$GLB, | Performed by: ORTHOPAEDIC SURGERY

## 2023-02-16 RX ORDER — CELECOXIB 200 MG/1
200 CAPSULE ORAL DAILY
Qty: 30 CAPSULE | Refills: 1 | Status: SHIPPED | OUTPATIENT
Start: 2023-02-16 | End: 2023-09-26

## 2023-02-16 NOTE — PROGRESS NOTES
CC: Left shoulder pain     68 y.o. Female presents as a new patient to me. Complaint is left shoulder pain x 2 mos. Atraumatic onset.  Pain localizes over the posterior and lateral trap/shoulder. Worse with overhead. Pain is disruptive to sleep at night on occasion. Better with rest. Admits neck pain but denies radicular symptoms. Treatment thus far has included nothing at this point.  Here today to discuss diagnosis and treatment options. RHD. Does not feel like she has significant weakness. Pain comes and goes    Pain Score:   6    PAST MEDICAL HISTORY:   Past Medical History:   Diagnosis Date    Hypothyroidism      PAST SURGICAL HISTORY:  History reviewed. No pertinent surgical history.    FAMILY HISTORY:  Family History   Problem Relation Age of Onset    Heart disease Mother     Cancer Father         Luekemia      MEDICATIONS:    Current Outpatient Medications:     calcium carbonate-vit D3-min 600 mg calcium- 400 unit Tab, Take 1 tablet by mouth 2 (two) times daily., Disp: 180 tablet, Rfl: 3    calcium-vitamin D 600 mg-10 mcg (400 unit) Tab, Take 1 tablet by mouth 2 (two) times daily., Disp: , Rfl:     pantoprazole (PROTONIX) 40 MG tablet, Take 1 tablet (40 mg total) by mouth once daily., Disp: 30 tablet, Rfl: 2    Johnathon's wort 300 mg Cap, Take 300 mg by mouth 3 (three) times daily with meals., Disp: 90 each, Rfl: 11    thyroid, pork, (ARMOUR THYROID) 15 mg Tab, Take 3 tablets (45 mg total) by mouth before breakfast., Disp: 270 tablet, Rfl: 3    valACYclovir (VALTREX) 1000 MG tablet, FOR INTIAL OUTBREAK: TAKE ONE PILL TWICE DAILY X 7 DAYS., Disp: , Rfl:     alendronate (FOSAMAX) 70 MG tablet, TAKE 1 TABLET (70 MG TOTAL) BY MOUTH EVERY 7 DAYS, Disp: 12 tablet, Rfl: 1    celecoxib (CELEBREX) 200 MG capsule, Take 1 capsule (200 mg total) by mouth once daily., Disp: 30 capsule, Rfl: 1    ALLERGIES:  Review of patient's allergies indicates:   Allergen Reactions    Hazelnut Shortness Of Breath     REVIEW OF  "SYSTEMS:  Constitution: Negative. Negative for chills, fever and night sweats.    Hematologic/Lymphatic: Negative for bleeding problem. Does not bruise/bleed easily.   Skin: Negative for dry skin, itching and rash.   Musculoskeletal: Negative for falls. Positive for left shoulder pain and muscle weakness.     All other review of symptoms were reviewed and found to be noncontributory.    PHYSICAL EXAMINATION:  Vitals:  /83   Pulse 89   Ht 5' 4" (1.626 m)   Wt 56.4 kg (124 lb 6.4 oz)   BMI 21.35 kg/m²    General: Well-developed well-nourished 68 y.o. femalein no acute distress   Cardiovascular: Regular rhythm by palpation of distal pulse, normal color and temperature, no concerning varicosities on symptomatic side   Lungs: No labored breathing or wheezing appreciated   Neuro: Alert and oriented ×3   Psychiatric: well oriented to person, place and time, demonstrates normal mood and affect   Skin: No rashes, lesions or ulcers, normal temperature, turgor, and texture on uninvolved extremity    Ortho/SPM Exam  Examination of the left shoulder demonstrates active forward elevation to 170, ER with arm at side to 60, IR to T4. Passive FE to 170, ER to 70. Prominent tenderness along the posterior/lateral shoulder and trapezius. Negative AC tenderness. 5/5 resisted supraspinatus testing. 5/5 resisted infraspinatus testing.  No pain on resisted cuff testing.  Negative belly press test. Stable shoulder. No midline neck tenderness. Negative Spurling's maneuver. Dyskinetic scapula on left with TTP over pec minor and inferior angle prominence. Some referred pain in to the trap from the neck as well with cervical neck range of motion.    IMAGING:  Xrays including AP, Outlet and Axillary Lateral of Left shoulder are ordered / images reviewed by me:   No fracture or acute change appreciated. No significant glenohumeral degenerative changes. Mild to moderate AC joint arthritis. Normal acromiohumeral distance.     ASSESSMENT:  "     ICD-10-CM ICD-9-CM   1. Scapular dyskinesis  G25.89 781.3   2. Cervicalgia  M54.2 723.1   3. Chronic left shoulder pain  M25.512 719.41    G89.29 338.29     PLAN:     -Findings and treatment options were discussed with the patient.  Good cuff strength on exam without pain on resisted cuff testing.  Pain appears referred from the scapula and cervical neck region.  Conservative care recommended.  -PT, Celebrex ordered  -RTC PRN.  Pain is persistent or recurrent despite these measures and becomes more shoulder specific, next step would be advanced imaging.  -All questions answered    Procedures

## 2023-02-17 ENCOUNTER — OFFICE VISIT (OUTPATIENT)
Dept: ORTHOPEDICS | Facility: CLINIC | Age: 68
End: 2023-02-17
Payer: MEDICARE

## 2023-02-17 VITALS
WEIGHT: 123.13 LBS | HEART RATE: 67 BPM | BODY MASS INDEX: 21.02 KG/M2 | HEIGHT: 64 IN | DIASTOLIC BLOOD PRESSURE: 76 MMHG | SYSTOLIC BLOOD PRESSURE: 135 MMHG

## 2023-02-17 DIAGNOSIS — M70.62 TROCHANTERIC BURSITIS OF BOTH HIPS: Primary | ICD-10-CM

## 2023-02-17 DIAGNOSIS — M51.36 DDD (DEGENERATIVE DISC DISEASE), LUMBAR: ICD-10-CM

## 2023-02-17 DIAGNOSIS — M70.61 TROCHANTERIC BURSITIS OF BOTH HIPS: Primary | ICD-10-CM

## 2023-02-17 PROCEDURE — 3008F PR BODY MASS INDEX (BMI) DOCUMENTED: ICD-10-PCS | Mod: HCNC,CPTII,S$GLB, | Performed by: PHYSICIAN ASSISTANT

## 2023-02-17 PROCEDURE — 1101F PR PT FALLS ASSESS DOC 0-1 FALLS W/OUT INJ PAST YR: ICD-10-PCS | Mod: HCNC,CPTII,S$GLB, | Performed by: PHYSICIAN ASSISTANT

## 2023-02-17 PROCEDURE — 99999 PR PBB SHADOW E&M-EST. PATIENT-LVL III: ICD-10-PCS | Mod: PBBFAC,HCNC,, | Performed by: PHYSICIAN ASSISTANT

## 2023-02-17 PROCEDURE — 1101F PT FALLS ASSESS-DOCD LE1/YR: CPT | Mod: HCNC,CPTII,S$GLB, | Performed by: PHYSICIAN ASSISTANT

## 2023-02-17 PROCEDURE — 3078F DIAST BP <80 MM HG: CPT | Mod: HCNC,CPTII,S$GLB, | Performed by: PHYSICIAN ASSISTANT

## 2023-02-17 PROCEDURE — 1159F PR MEDICATION LIST DOCUMENTED IN MEDICAL RECORD: ICD-10-PCS | Mod: HCNC,CPTII,S$GLB, | Performed by: PHYSICIAN ASSISTANT

## 2023-02-17 PROCEDURE — 20610 DRAIN/INJ JOINT/BURSA W/O US: CPT | Mod: 50,HCNC,S$GLB, | Performed by: PHYSICIAN ASSISTANT

## 2023-02-17 PROCEDURE — 20610 PR DRAIN/INJECT LARGE JOINT/BURSA: ICD-10-PCS | Mod: 50,HCNC,S$GLB, | Performed by: PHYSICIAN ASSISTANT

## 2023-02-17 PROCEDURE — 1125F PR PAIN SEVERITY QUANTIFIED, PAIN PRESENT: ICD-10-PCS | Mod: HCNC,CPTII,S$GLB, | Performed by: PHYSICIAN ASSISTANT

## 2023-02-17 PROCEDURE — 99999 PR PBB SHADOW E&M-EST. PATIENT-LVL III: CPT | Mod: PBBFAC,HCNC,, | Performed by: PHYSICIAN ASSISTANT

## 2023-02-17 PROCEDURE — 99213 OFFICE O/P EST LOW 20 MIN: CPT | Mod: HCNC,25,S$GLB, | Performed by: PHYSICIAN ASSISTANT

## 2023-02-17 PROCEDURE — 1125F AMNT PAIN NOTED PAIN PRSNT: CPT | Mod: HCNC,CPTII,S$GLB, | Performed by: PHYSICIAN ASSISTANT

## 2023-02-17 PROCEDURE — 3008F BODY MASS INDEX DOCD: CPT | Mod: HCNC,CPTII,S$GLB, | Performed by: PHYSICIAN ASSISTANT

## 2023-02-17 PROCEDURE — 3078F PR MOST RECENT DIASTOLIC BLOOD PRESSURE < 80 MM HG: ICD-10-PCS | Mod: HCNC,CPTII,S$GLB, | Performed by: PHYSICIAN ASSISTANT

## 2023-02-17 PROCEDURE — 3288F PR FALLS RISK ASSESSMENT DOCUMENTED: ICD-10-PCS | Mod: HCNC,CPTII,S$GLB, | Performed by: PHYSICIAN ASSISTANT

## 2023-02-17 PROCEDURE — 3075F SYST BP GE 130 - 139MM HG: CPT | Mod: HCNC,CPTII,S$GLB, | Performed by: PHYSICIAN ASSISTANT

## 2023-02-17 PROCEDURE — 99213 PR OFFICE/OUTPT VISIT, EST, LEVL III, 20-29 MIN: ICD-10-PCS | Mod: HCNC,25,S$GLB, | Performed by: PHYSICIAN ASSISTANT

## 2023-02-17 PROCEDURE — 1160F PR REVIEW ALL MEDS BY PRESCRIBER/CLIN PHARMACIST DOCUMENTED: ICD-10-PCS | Mod: HCNC,CPTII,S$GLB, | Performed by: PHYSICIAN ASSISTANT

## 2023-02-17 PROCEDURE — 1160F RVW MEDS BY RX/DR IN RCRD: CPT | Mod: HCNC,CPTII,S$GLB, | Performed by: PHYSICIAN ASSISTANT

## 2023-02-17 PROCEDURE — 3288F FALL RISK ASSESSMENT DOCD: CPT | Mod: HCNC,CPTII,S$GLB, | Performed by: PHYSICIAN ASSISTANT

## 2023-02-17 PROCEDURE — 3075F PR MOST RECENT SYSTOLIC BLOOD PRESS GE 130-139MM HG: ICD-10-PCS | Mod: HCNC,CPTII,S$GLB, | Performed by: PHYSICIAN ASSISTANT

## 2023-02-17 PROCEDURE — 1159F MED LIST DOCD IN RCRD: CPT | Mod: HCNC,CPTII,S$GLB, | Performed by: PHYSICIAN ASSISTANT

## 2023-02-17 RX ORDER — BETAMETHASONE SODIUM PHOSPHATE AND BETAMETHASONE ACETATE 3; 3 MG/ML; MG/ML
12 INJECTION, SUSPENSION INTRA-ARTICULAR; INTRALESIONAL; INTRAMUSCULAR; SOFT TISSUE
Status: COMPLETED | OUTPATIENT
Start: 2023-02-17 | End: 2023-02-17

## 2023-02-17 RX ADMIN — BETAMETHASONE SODIUM PHOSPHATE AND BETAMETHASONE ACETATE 12 MG: 3; 3 INJECTION, SUSPENSION INTRA-ARTICULAR; INTRALESIONAL; INTRAMUSCULAR; SOFT TISSUE at 03:02

## 2023-02-17 NOTE — PROGRESS NOTES
SUBJECTIVE:     Chief Complaint & History of Present Illness:  Kalina Ivan is a New patient 68 y.o. female who is seen here today with a complaint of    Chief Complaint   Patient presents with    Left Hip - Pain    Right Hip - Pain    .  Patient is here today as referral from Dr. Sánchez for therapeutic injection of bilateral greater trochanteric bursa.  She is currently being worked up for sciatic low back pain versus trochanteric bursitis.  Has pain in bilateral lateral hips over the greater trochanteric bursa most aggravated with periods of prolonged standing ambulation can be tender when she lies on the sides of her hips.  On a scale of 1-10, with 10 being worst pain imaginable, he rates this pain as 3 on good days and 6 on bad days.  she describes the pain as tender.      Past Medical History:   Diagnosis Date    Hypothyroidism        No past surgical history on file.    Family History   Problem Relation Age of Onset    Heart disease Mother     Cancer Father         Sarahekechristian        Review of patient's allergies indicates:   Allergen Reactions    Hazelnut Shortness Of Breath         Current Outpatient Medications:     alendronate (FOSAMAX) 70 MG tablet, Take 1 tablet (70 mg total) by mouth every 7 days., Disp: 12 tablet, Rfl: 1    calcium carbonate-vit D3-min 600 mg calcium- 400 unit Tab, Take 1 tablet by mouth 2 (two) times daily., Disp: 180 tablet, Rfl: 3    calcium-vitamin D 600 mg-10 mcg (400 unit) Tab, Take 1 tablet by mouth 2 (two) times daily., Disp: , Rfl:     celecoxib (CELEBREX) 200 MG capsule, Take 1 capsule (200 mg total) by mouth once daily., Disp: 30 capsule, Rfl: 1    pantoprazole (PROTONIX) 40 MG tablet, Take 1 tablet (40 mg total) by mouth once daily., Disp: 30 tablet, Rfl: 2    Schriever's wort 300 mg Cap, Take 300 mg by mouth 3 (three) times daily with meals., Disp: 90 each, Rfl: 11    thyroid, pork, (ARMOUR THYROID) 15 mg Tab, Take 3 tablets (45 mg total) by mouth before breakfast., Disp: 270  "tablet, Rfl: 3    valACYclovir (VALTREX) 1000 MG tablet, FOR INTIAL OUTBREAK: TAKE ONE PILL TWICE DAILY X 7 DAYS., Disp: , Rfl:     Review of Systems:  ROS:  Constitutional: no fever or chills  Eyes: no visual changes  ENT: no nasal congestion or sore throat  Respiratory: no cough or shortness of breath  Cardiovascular: no chest pain or palpitations  Gastrointestinal: no nausea or vomiting, tolerating diet  Genitourinary: no hematuria or dysuria  Integument/Breast: no rash or pruritis  Hematologic/Lymphatic: no easy bruising or lymphadenopathy  Musculoskeletal: no arthralgias or myalgias, Chronic low back pain, osteoporosis  Neurological: no seizures or tremors  Behavioral/Psych: no auditory or visual hallucinations  Endocrine: no heat or cold intolerance, positive hypothyroidism      /76 (BP Location: Left arm, Patient Position: Sitting, BP Method: Medium (Automatic))   Pulse 67   Ht 5' 4" (1.626 m)   Wt 55.8 kg (123 lb 2 oz)   BMI 21.13 kg/m²   General: Pleasant, cooperative, NAD   HEENT: NCAT, sclera nonicteric   Lungs: Respirations are equal and unlabored.   Abdomen: Soft and non-tender.  CV: 2+ bilateral upper and lower extremity pulses.   Skin: Intact throughout LE with no rashes, erythema, or lesions  Extremities: No LE edema, NVI lower extremities        Hip Exam:   bilateralpositives: tenderness over greater trochanter and negatives: FROM  no pain with heel impact  pulses full    130 degrees flexion  -05 degrees extension   45 degrees internal rotation  40 degrees external rotation  45 degrees abduction  -10 degrees adduction   0 flexion contracture      RADIOGRAPHS:  X-rays the hip from previous visit reviewed by me today demonstrate no evidence of fracture dislocation joint spaces well maintained no advanced degenerative joint disease    ASSESSMENT/PLAN:       ICD-10-CM ICD-9-CM   1. Trochanteric bursitis of both hips  M70.61 726.5    M70.62    2. DDD (degenerative disc disease), lumbar  M51.36 " 722.52       Plan: We discussed with the patient at length all the different treatment options available for arthrosis of the hip including anti-inflammatories, acetaminophen, rest, ice, lower extremity strengthening exercise, occasional cortisone injections for temporary relief, and finally total hip arthroplasty.   Proceed with therapeutic diagnostic cortisone injection bilateral greater trochanteric bursa     The injection site was identified and the skin was prepared with an ETOH solution. The  greater trochanteric bursa of  bilateral  hip was injected with 1 ml of Celestone and 5 ml Lidocaine under sterile technique. Kalina PLATT Moncho tolerated the procedure well, she was advised to rest the  hip  today, ice and support. she did receive immediate relief of the pain in and about her  hip  she was told this would be short lived and is secondary to the lidocaine. she may have an increase in her discomfort tonight followed by steady improvement over the next several days. It may take 1-3 weeks following the injection to get the full benefit of the medication.  I will see her back in 4-6 months. Sooner if she has any problems or concerns.

## 2023-02-23 ENCOUNTER — TELEPHONE (OUTPATIENT)
Dept: ORTHOPEDICS | Facility: CLINIC | Age: 68
End: 2023-02-23
Payer: MEDICARE

## 2023-02-23 NOTE — TELEPHONE ENCOUNTER
----- Message from Eliana Carvajal MA sent at 2/22/2023  2:04 PM CST -----  Regarding: FW: pt advice  Contact: [phone]    ----- Message -----  From: Elvi RAMIREZ May  Sent: 2/22/2023   1:04 PM CST  To: [facility] Staff  Subject: pt advice                                        Pt states she has numbing of the left foot. She has a vibration, google told her there may be nerve damage. Pt is very concerned since getting injection for bilateral hip pain. Pls call to discuss.

## 2023-02-23 NOTE — TELEPHONE ENCOUNTER
Spoke with patient and she stated that for 10 days she has been feeling off and on numbness in the left foot and strong vibrations and it has gotten worse over the weekend, feels like a pressing against something , with no pain. Patient had injection in hip on Friday and it only hurts on the left side.

## 2023-02-24 NOTE — TELEPHONE ENCOUNTER
Spoke with patient and she and verbally agreed to the date and time of the appointment on Tuesday.

## 2023-02-28 ENCOUNTER — OFFICE VISIT (OUTPATIENT)
Dept: ORTHOPEDICS | Facility: CLINIC | Age: 68
End: 2023-02-28
Payer: MEDICARE

## 2023-02-28 VITALS — WEIGHT: 124.13 LBS | BODY MASS INDEX: 21.19 KG/M2 | HEIGHT: 64 IN

## 2023-02-28 DIAGNOSIS — M70.62 TROCHANTERIC BURSITIS OF BOTH HIPS: ICD-10-CM

## 2023-02-28 DIAGNOSIS — R20.0 NUMBNESS OF LEFT FOOT: ICD-10-CM

## 2023-02-28 DIAGNOSIS — M70.61 TROCHANTERIC BURSITIS OF BOTH HIPS: ICD-10-CM

## 2023-02-28 DIAGNOSIS — M51.36 DDD (DEGENERATIVE DISC DISEASE), LUMBAR: Primary | ICD-10-CM

## 2023-02-28 PROCEDURE — 99214 OFFICE O/P EST MOD 30 MIN: CPT | Mod: S$GLB,,, | Performed by: ORTHOPAEDIC SURGERY

## 2023-02-28 PROCEDURE — 1126F PR PAIN SEVERITY QUANTIFIED, NO PAIN PRESENT: ICD-10-PCS | Mod: CPTII,S$GLB,, | Performed by: ORTHOPAEDIC SURGERY

## 2023-02-28 PROCEDURE — 1160F RVW MEDS BY RX/DR IN RCRD: CPT | Mod: CPTII,S$GLB,, | Performed by: ORTHOPAEDIC SURGERY

## 2023-02-28 PROCEDURE — 1159F PR MEDICATION LIST DOCUMENTED IN MEDICAL RECORD: ICD-10-PCS | Mod: CPTII,S$GLB,, | Performed by: ORTHOPAEDIC SURGERY

## 2023-02-28 PROCEDURE — 1101F PT FALLS ASSESS-DOCD LE1/YR: CPT | Mod: CPTII,S$GLB,, | Performed by: ORTHOPAEDIC SURGERY

## 2023-02-28 PROCEDURE — 1159F MED LIST DOCD IN RCRD: CPT | Mod: CPTII,S$GLB,, | Performed by: ORTHOPAEDIC SURGERY

## 2023-02-28 PROCEDURE — 99999 PR PBB SHADOW E&M-EST. PATIENT-LVL III: ICD-10-PCS | Mod: PBBFAC,,, | Performed by: ORTHOPAEDIC SURGERY

## 2023-02-28 PROCEDURE — 3008F PR BODY MASS INDEX (BMI) DOCUMENTED: ICD-10-PCS | Mod: CPTII,S$GLB,, | Performed by: ORTHOPAEDIC SURGERY

## 2023-02-28 PROCEDURE — 3008F BODY MASS INDEX DOCD: CPT | Mod: CPTII,S$GLB,, | Performed by: ORTHOPAEDIC SURGERY

## 2023-02-28 PROCEDURE — 1126F AMNT PAIN NOTED NONE PRSNT: CPT | Mod: CPTII,S$GLB,, | Performed by: ORTHOPAEDIC SURGERY

## 2023-02-28 PROCEDURE — 99999 PR PBB SHADOW E&M-EST. PATIENT-LVL III: CPT | Mod: PBBFAC,,, | Performed by: ORTHOPAEDIC SURGERY

## 2023-02-28 PROCEDURE — 3288F PR FALLS RISK ASSESSMENT DOCUMENTED: ICD-10-PCS | Mod: CPTII,S$GLB,, | Performed by: ORTHOPAEDIC SURGERY

## 2023-02-28 PROCEDURE — 99214 PR OFFICE/OUTPT VISIT, EST, LEVL IV, 30-39 MIN: ICD-10-PCS | Mod: S$GLB,,, | Performed by: ORTHOPAEDIC SURGERY

## 2023-02-28 PROCEDURE — 3288F FALL RISK ASSESSMENT DOCD: CPT | Mod: CPTII,S$GLB,, | Performed by: ORTHOPAEDIC SURGERY

## 2023-02-28 PROCEDURE — 1160F PR REVIEW ALL MEDS BY PRESCRIBER/CLIN PHARMACIST DOCUMENTED: ICD-10-PCS | Mod: CPTII,S$GLB,, | Performed by: ORTHOPAEDIC SURGERY

## 2023-02-28 PROCEDURE — 1101F PR PT FALLS ASSESS DOC 0-1 FALLS W/OUT INJ PAST YR: ICD-10-PCS | Mod: CPTII,S$GLB,, | Performed by: ORTHOPAEDIC SURGERY

## 2023-02-28 NOTE — PROGRESS NOTES
"DATE: 2/28/2023  PATIENT: Kalina Ivan    Attending Physician: Wild Sánchez M.D.    HISTORY:  Kalina Ivan is a 68 y.o. female w/ a hx of osteoporosis (on foxamax) who returns to me today for FU. Patient continues to have bilateral lateral hip pain that radiates to the knees occasionally. She got bilateral GT bursa injections, and she has been getting vibration and numbness feelings in her left foot. She saw Sports for her left shoulder, who recommended conservative management.    The patient does not smoke, have DM or endorse IVDU. The patient is not on any blood thinners and does not take chronic narcotics. She works as a , writer and .    PMH/PSH/FamHx/SocHx:  Unchanged from prior visit    ROS:  Positive for LBP and LLE numbness  Denies perineal paresthesias, bowel or bladder incontinence    EXAM:  Ht 5' 4" (1.626 m)   Wt 56.3 kg (124 lb 1.9 oz)   BMI 21.30 kg/m²     My physical examination was notable for the following findings: Normal strength and tone in all major motor groups in the bilateral lower extremities except for 4/5 in left HF. Normal sensation to light touch in the L2-S1 dermatomes bilaterally. B/l trochanteric tenderness to palpation.    IMAGING:  Today I independently reviewed the following images and my interpretations are as follows:    Previous L-spine XRs showed lumbar spondylosis and DDD without listhesis.      Lumbar MRI showed no significant central or foraminal stenosis.    ASSESSMENT/PLAN:  Patient has lumbar spondylosis and b/l GT bursitis. I discussed the natural history of their diagnoses as well as surgical and nonsurgical treatment options. I educated the patient on the importance of core/back strengthening, correct posture, bending/lifting ergonomics, and low-impact aerobic exercises (walking, elliptical, and aquatherapy). Continue medications OTC. Lumbar MRI not impressive; no ortho spine surgical intervention warranted. I referred her to Neurology. Patient " will follow up PRN.    Wild Sánchez MD  Orthopaedic Spine Surgeon  Department of Orthopaedic Surgery  [phone]

## 2023-03-22 PROBLEM — S82.891A MALLEOLAR FRACTURE, RIGHT, CLOSED, INITIAL ENCOUNTER: Status: ACTIVE | Noted: 2023-03-22

## 2023-03-22 PROBLEM — M81.0 OSTEOPOROSIS: Status: ACTIVE | Noted: 2021-11-29

## 2023-03-22 PROBLEM — S52.121A CLOSED FRACTURE OF HEAD OF RIGHT RADIUS: Status: ACTIVE | Noted: 2023-03-22

## 2023-03-22 PROBLEM — E03.9 HYPOTHYROIDISM: Status: ACTIVE | Noted: 2021-08-24

## 2023-03-23 PROBLEM — S52.121A CLOSED FRACTURE OF HEAD OF RIGHT RADIUS: Status: RESOLVED | Noted: 2023-03-22 | Resolved: 2023-03-23

## 2023-03-23 PROBLEM — S82.401A: Status: ACTIVE | Noted: 2023-03-23

## 2023-03-23 PROBLEM — S82.401A: Status: RESOLVED | Noted: 2023-03-23 | Resolved: 2023-03-23

## 2023-03-23 PROBLEM — S82.891A MALLEOLAR FRACTURE, RIGHT, CLOSED, INITIAL ENCOUNTER: Status: RESOLVED | Noted: 2023-03-22 | Resolved: 2023-03-23

## 2023-03-24 ENCOUNTER — TELEPHONE (OUTPATIENT)
Dept: PRIMARY CARE CLINIC | Facility: CLINIC | Age: 68
End: 2023-03-24
Payer: MEDICARE

## 2023-03-24 NOTE — TELEPHONE ENCOUNTER
----- Message from Marielle Barron sent at 3/24/2023  4:55 PM CDT -----  Contact: self [phone] or Leonardo (friend) [phone]  Pt currently at [facility] with broken leg requesting a call from pcp in regards to skilled nursing need pcp to advocate surgery Monday.     Please call and advise

## 2023-03-24 NOTE — TELEPHONE ENCOUNTER
Called patient and explained to her that the hospitalist handles anything that happens in the hospital.

## 2023-03-27 ENCOUNTER — PATIENT MESSAGE (OUTPATIENT)
Dept: PRIMARY CARE CLINIC | Facility: CLINIC | Age: 68
End: 2023-03-27
Payer: MEDICARE

## 2023-04-03 ENCOUNTER — TELEPHONE (OUTPATIENT)
Dept: ORTHOPEDICS | Facility: CLINIC | Age: 68
End: 2023-04-03
Payer: MEDICARE

## 2023-04-03 NOTE — TELEPHONE ENCOUNTER
----- Message from Lillian Wisdom sent at 4/3/2023  9:08 AM CDT -----  Regarding: appt access  Contact: self @ [phone]  Pt had surgery on 3-27-23 and was advised to schedule a 2 week f/u appt but has been scheduled for a 3 week f/u appt.  She is calling to see if the appt can be moved up a week.  Pls call with a new appt.

## 2023-04-03 NOTE — TELEPHONE ENCOUNTER
----- Message from Lara Marx LPN sent at 4/3/2023  9:54 AM CDT -----  Regarding: FW: appt access  Contact: self @ [phone]    ----- Message -----  From: Lillian Wisdom  Sent: 4/3/2023   9:10 AM CDT  To: Kelli Arcos Staff, #  Subject: appt access                                      Pt had surgery on 3-27-23 and was advised to schedule a 2 week f/u appt but has been scheduled for a 3 week f/u appt.  She is calling to see if the appt can be moved up a week.  Pls call with a new appt.

## 2023-04-03 NOTE — TELEPHONE ENCOUNTER
Spoke with pt. Advised pt that a new appt has been scheduled. All questions answered. Pt verbalized understanding.

## 2023-04-06 ENCOUNTER — TELEPHONE (OUTPATIENT)
Dept: PRIMARY CARE CLINIC | Facility: CLINIC | Age: 68
End: 2023-04-06
Payer: MEDICARE

## 2023-04-06 NOTE — TELEPHONE ENCOUNTER
----- Message from Michelle Aceves sent at 4/6/2023 11:30 AM CDT -----  Contact: [phone]  Pt's friend Katie called to speak to the provider in reference to the care the pt has been given at the [facility] nursing [facility]. She says that the place smells of urine, the pt has been screaming for help with no response, and the nurses do not answer the call light. She would like help getting the pt out of the facility. Please Advise

## 2023-04-11 ENCOUNTER — OFFICE VISIT (OUTPATIENT)
Dept: ORTHOPEDICS | Facility: CLINIC | Age: 68
End: 2023-04-11
Payer: MEDICARE

## 2023-04-11 VITALS
HEART RATE: 76 BPM | DIASTOLIC BLOOD PRESSURE: 92 MMHG | BODY MASS INDEX: 20.49 KG/M2 | HEIGHT: 64 IN | SYSTOLIC BLOOD PRESSURE: 140 MMHG | WEIGHT: 120 LBS

## 2023-04-11 DIAGNOSIS — S52.121D CLOSED DISPLACED FRACTURE OF HEAD OF RIGHT RADIUS WITH ROUTINE HEALING, SUBSEQUENT ENCOUNTER: Primary | ICD-10-CM

## 2023-04-11 DIAGNOSIS — S82.841A CLOSED BIMALLEOLAR FRACTURE OF RIGHT ANKLE, INITIAL ENCOUNTER: ICD-10-CM

## 2023-04-11 DIAGNOSIS — M25.571 RIGHT ANKLE PAIN, UNSPECIFIED CHRONICITY: Primary | ICD-10-CM

## 2023-04-11 PROCEDURE — 3080F DIAST BP >= 90 MM HG: CPT | Mod: HCNC,CPTII,S$GLB,

## 2023-04-11 PROCEDURE — 3288F PR FALLS RISK ASSESSMENT DOCUMENTED: ICD-10-PCS | Mod: HCNC,CPTII,S$GLB,

## 2023-04-11 PROCEDURE — 3008F BODY MASS INDEX DOCD: CPT | Mod: HCNC,CPTII,S$GLB,

## 2023-04-11 PROCEDURE — 3080F PR MOST RECENT DIASTOLIC BLOOD PRESSURE >= 90 MM HG: ICD-10-PCS | Mod: HCNC,CPTII,S$GLB,

## 2023-04-11 PROCEDURE — 99999 PR PBB SHADOW E&M-EST. PATIENT-LVL III: CPT | Mod: PBBFAC,HCNC,,

## 2023-04-11 PROCEDURE — 1100F PR PT FALLS ASSESS DOC 2+ FALLS/FALL W/INJURY/YR: ICD-10-PCS | Mod: HCNC,CPTII,S$GLB,

## 2023-04-11 PROCEDURE — 3008F PR BODY MASS INDEX (BMI) DOCUMENTED: ICD-10-PCS | Mod: HCNC,CPTII,S$GLB,

## 2023-04-11 PROCEDURE — 99999 PR PBB SHADOW E&M-EST. PATIENT-LVL III: ICD-10-PCS | Mod: PBBFAC,HCNC,,

## 2023-04-11 PROCEDURE — 3288F FALL RISK ASSESSMENT DOCD: CPT | Mod: HCNC,CPTII,S$GLB,

## 2023-04-11 PROCEDURE — 3077F PR MOST RECENT SYSTOLIC BLOOD PRESSURE >= 140 MM HG: ICD-10-PCS | Mod: HCNC,CPTII,S$GLB,

## 2023-04-11 PROCEDURE — 99024 PR POST-OP FOLLOW-UP VISIT: ICD-10-PCS | Mod: HCNC,S$GLB,,

## 2023-04-11 PROCEDURE — 3077F SYST BP >= 140 MM HG: CPT | Mod: HCNC,CPTII,S$GLB,

## 2023-04-11 PROCEDURE — 99024 POSTOP FOLLOW-UP VISIT: CPT | Mod: HCNC,S$GLB,,

## 2023-04-11 PROCEDURE — 1100F PTFALLS ASSESS-DOCD GE2>/YR: CPT | Mod: HCNC,CPTII,S$GLB,

## 2023-04-11 PROCEDURE — 1125F AMNT PAIN NOTED PAIN PRSNT: CPT | Mod: HCNC,CPTII,S$GLB,

## 2023-04-11 PROCEDURE — 1125F PR PAIN SEVERITY QUANTIFIED, PAIN PRESENT: ICD-10-PCS | Mod: HCNC,CPTII,S$GLB,

## 2023-04-11 RX ORDER — HYDROCODONE BITARTRATE AND ACETAMINOPHEN 5; 325 MG/1; MG/1
1 TABLET ORAL EVERY 8 HOURS PRN
COMMUNITY
End: 2023-09-26

## 2023-04-11 NOTE — PROGRESS NOTES
Post-op Note    HPI    Kalina Ivan is here 2 weeks s/p the following procedure:     3/28/2023  Open reduction internal fixation right bimalleolar ankle fracture  Open reduction internal fixation right ankle syndesmosis    After discharge patient was placed in skilled nursing facility.  She reports displaced feels like a nursing home and people are just ready to die there.  She feels she is not receiving the necessary physical therapy for her right arm due to her radial head fracture as she would need.  She has been nonweightbearing to her right lower extremity.  She has kept the splint on.  She is more concerned about medial ankle pain.  She reports she is taking the Norco every 24 hours however she feels like she may need to take it more often.  She feels the staff at the nursing facility does not manage her medications well.  She is here with her friend Zayra today.  They are working to transfer her to inpatient rehab. Denies any chest pain or shortness of breathe. Denies any drainage from the incision. Denies any fevers, chills or paresthesias.     DVT Prophylaxis: ASA x 2 weeks      Physical Exam:     Patient is alert and oriented no acute distress.   Assistive Device: wheelchair, in short leg splint    Right ankle:  Splint removed, sutures removed.  There is a small dime sized blister to her medial ankle, this was present prior to surgery. Mild bleeding with suture removal. Medial and lateral  Incision(s) are well healed.  There is no evidence of dehiscence.  There is no induration erythema or signs of infection.  Appropriate soft tissue swelling.  Compartments are soft and compressible.  Warm well-perfused extremity.  Xeroform and Steri-Strips placed.  Xeroform placed on blister.  Medial ankle well padded.  Short-leg cast applied.    Assessment    Kalina Ivan is 2 weeks Post-op     Plan:    Overall doing as expected.  We discussed expectations of surgery and postoperative course.     Pain: Continued  postoperative pain regimen -- patient may increase Norco dose as needed.  She may also take ibuprofen 600 mg or Tylenol in between as needed for pain.  DVT prophylaxis:  Aspirin, completed  PT/OT: Continue/Initiate physical therapy (weight bearing status:  Nonweightbearing to right lower extremity), she may start weight-bearing to her right upper extremity as tolerated.    Patient is very unhappy at [facility], I do believe patient would benefit from inpatient rehabilitation especially regarding her right arm. It is my medial opinion she may now progress to inpatient rehab and begin more aggressive PT to her right arm than what she is experiencing now, she may now be weight-bearing to right arm.  She is still nonweightbearing to her right lower extremity.  [employer] order placed.      Xrays right elbow ordered today.     Follow-up: 4 weeks   X-rays next visit: right ankle out of cast

## 2023-04-21 ENCOUNTER — PATIENT MESSAGE (OUTPATIENT)
Dept: PRIMARY CARE CLINIC | Facility: CLINIC | Age: 68
End: 2023-04-21
Payer: MEDICARE

## 2023-04-21 ENCOUNTER — PATIENT MESSAGE (OUTPATIENT)
Dept: ADMINISTRATIVE | Facility: HOSPITAL | Age: 68
End: 2023-04-21
Payer: MEDICARE

## 2023-04-24 ENCOUNTER — PATIENT MESSAGE (OUTPATIENT)
Dept: ORTHOPEDICS | Facility: CLINIC | Age: 68
End: 2023-04-24
Payer: MEDICARE

## 2023-04-25 DIAGNOSIS — M25.571 RIGHT ANKLE PAIN, UNSPECIFIED CHRONICITY: Primary | ICD-10-CM

## 2023-04-25 DIAGNOSIS — S52.121D CLOSED DISPLACED FRACTURE OF HEAD OF RIGHT RADIUS WITH ROUTINE HEALING, SUBSEQUENT ENCOUNTER: ICD-10-CM

## 2023-04-27 ENCOUNTER — OFFICE VISIT (OUTPATIENT)
Dept: ORTHOPEDICS | Facility: CLINIC | Age: 68
End: 2023-04-27
Payer: MEDICARE

## 2023-04-27 VITALS
DIASTOLIC BLOOD PRESSURE: 85 MMHG | BODY MASS INDEX: 20.47 KG/M2 | HEIGHT: 64 IN | SYSTOLIC BLOOD PRESSURE: 138 MMHG | WEIGHT: 119.94 LBS | HEART RATE: 72 BPM

## 2023-04-27 DIAGNOSIS — M25.571 RIGHT ANKLE PAIN, UNSPECIFIED CHRONICITY: Primary | ICD-10-CM

## 2023-04-27 DIAGNOSIS — S82.841A CLOSED BIMALLEOLAR FRACTURE OF RIGHT ANKLE, INITIAL ENCOUNTER: ICD-10-CM

## 2023-04-27 DIAGNOSIS — M54.41 CHRONIC LOW BACK PAIN WITH RIGHT-SIDED SCIATICA, UNSPECIFIED BACK PAIN LATERALITY: ICD-10-CM

## 2023-04-27 DIAGNOSIS — G89.29 CHRONIC LOW BACK PAIN WITH RIGHT-SIDED SCIATICA, UNSPECIFIED BACK PAIN LATERALITY: ICD-10-CM

## 2023-04-27 PROCEDURE — 99999 PR PBB SHADOW E&M-EST. PATIENT-LVL IV: ICD-10-PCS | Mod: PBBFAC,HCNC,,

## 2023-04-27 PROCEDURE — 3079F DIAST BP 80-89 MM HG: CPT | Mod: HCNC,CPTII,S$GLB,

## 2023-04-27 PROCEDURE — 1159F MED LIST DOCD IN RCRD: CPT | Mod: HCNC,CPTII,S$GLB,

## 2023-04-27 PROCEDURE — 3079F PR MOST RECENT DIASTOLIC BLOOD PRESSURE 80-89 MM HG: ICD-10-PCS | Mod: HCNC,CPTII,S$GLB,

## 2023-04-27 PROCEDURE — 3288F PR FALLS RISK ASSESSMENT DOCUMENTED: ICD-10-PCS | Mod: HCNC,CPTII,S$GLB,

## 2023-04-27 PROCEDURE — 1159F PR MEDICATION LIST DOCUMENTED IN MEDICAL RECORD: ICD-10-PCS | Mod: HCNC,CPTII,S$GLB,

## 2023-04-27 PROCEDURE — 99024 POSTOP FOLLOW-UP VISIT: CPT | Mod: HCNC,S$GLB,,

## 2023-04-27 PROCEDURE — 3288F FALL RISK ASSESSMENT DOCD: CPT | Mod: HCNC,CPTII,S$GLB,

## 2023-04-27 PROCEDURE — 3075F PR MOST RECENT SYSTOLIC BLOOD PRESS GE 130-139MM HG: ICD-10-PCS | Mod: HCNC,CPTII,S$GLB,

## 2023-04-27 PROCEDURE — 3075F SYST BP GE 130 - 139MM HG: CPT | Mod: HCNC,CPTII,S$GLB,

## 2023-04-27 PROCEDURE — 3008F BODY MASS INDEX DOCD: CPT | Mod: HCNC,CPTII,S$GLB,

## 2023-04-27 PROCEDURE — 1125F AMNT PAIN NOTED PAIN PRSNT: CPT | Mod: HCNC,CPTII,S$GLB,

## 2023-04-27 PROCEDURE — 1125F PR PAIN SEVERITY QUANTIFIED, PAIN PRESENT: ICD-10-PCS | Mod: HCNC,CPTII,S$GLB,

## 2023-04-27 PROCEDURE — 3008F PR BODY MASS INDEX (BMI) DOCUMENTED: ICD-10-PCS | Mod: HCNC,CPTII,S$GLB,

## 2023-04-27 PROCEDURE — 99024 PR POST-OP FOLLOW-UP VISIT: ICD-10-PCS | Mod: HCNC,S$GLB,,

## 2023-04-27 PROCEDURE — 1100F PR PT FALLS ASSESS DOC 2+ FALLS/FALL W/INJURY/YR: ICD-10-PCS | Mod: HCNC,CPTII,S$GLB,

## 2023-04-27 PROCEDURE — 99999 PR PBB SHADOW E&M-EST. PATIENT-LVL IV: CPT | Mod: PBBFAC,HCNC,,

## 2023-04-27 PROCEDURE — 1100F PTFALLS ASSESS-DOCD GE2>/YR: CPT | Mod: HCNC,CPTII,S$GLB,

## 2023-04-27 RX ORDER — GABAPENTIN 300 MG/1
300 CAPSULE ORAL 3 TIMES DAILY
Qty: 90 CAPSULE | Refills: 1 | Status: SHIPPED | OUTPATIENT
Start: 2023-04-27 | End: 2024-04-26

## 2023-04-27 RX ORDER — BETAMETHASONE SODIUM PHOSPHATE AND BETAMETHASONE ACETATE 3; 3 MG/ML; MG/ML
INJECTION, SUSPENSION INTRA-ARTICULAR; INTRALESIONAL; INTRAMUSCULAR; SOFT TISSUE
COMMUNITY
Start: 2023-02-17 | End: 2023-08-14

## 2023-04-27 NOTE — PROGRESS NOTES
Post-op Note    HPI    Kalina Ivan is here 4 weeks s/p the following procedure:     Open reduction internal fixation right bimalleolar ankle fracture  Open reduction internal fixation right ankle syndesmosis    Overall doing well. Pain controlled on current regimen, concerned about medial ankle pain and had small sore at last visit. She is here for a wound check. In Pt for elbow, not ankle. Was d/c from SNF and is now getting home health. Denies any chest pain or shortness of breathe. Denies any drainage from the incision. Denies any fevers, chills or paresthesias.     Physical Exam:     Patient is alert and oriented no acute distress.   Assistive Device: wheelchair    Right ankle: cast removed.  Incision(s) are well healed.  Small superficial dark scab removed, healed pink healthy tissue under. Site cleaned and betadine placed over. There is no evidence of dehiscence.  There is no induration erythema or signs of infection.  Appropriate soft tissue swelling.  Compartments are soft and compressible.  Warm well-perfused extremity. Posterior short leg splint reapplied. Medial incision image uploaded to media tab.     Assessment    Kalina Ivan is 4 weeks Post-op     Plan:    Overall doing as expected.  We discussed expectations of surgery and postoperative course.     Pain: Continued postoperative pain regimen -- alternate ibuprofen/tylenol. Will start gabapentin 300 mg TID, titration instructions given   PT/OT: Continue/Initiate physical therapy (weight bearing status: NWB), cont PT for elbow, will add ankle when splint removed     Follow-up: 2 weeks   X-rays next visit: none

## 2023-04-27 NOTE — PATIENT INSTRUCTIONS
Gabapentin 300mg pills  Start taking one pill at night. (1 pill total per day.)  After three days, start taking one pill in the morning and one pill at night (2 pills total per day.)  After three days start taking one pill in the morning, one pill in the afternoon and one pill at night. (3 pills total per day.)    Stay at this dose until next visit.   If experiencing any side effects take highest dose tolerated.

## 2023-05-01 DIAGNOSIS — S82.841A CLOSED BIMALLEOLAR FRACTURE OF RIGHT ANKLE, INITIAL ENCOUNTER: Primary | ICD-10-CM

## 2023-05-03 ENCOUNTER — PATIENT MESSAGE (OUTPATIENT)
Dept: PRIMARY CARE CLINIC | Facility: CLINIC | Age: 68
End: 2023-05-03
Payer: MEDICARE

## 2023-05-04 DIAGNOSIS — S52.121D CLOSED DISPLACED FRACTURE OF HEAD OF RIGHT RADIUS WITH ROUTINE HEALING, SUBSEQUENT ENCOUNTER: Primary | ICD-10-CM

## 2023-05-05 ENCOUNTER — OFFICE VISIT (OUTPATIENT)
Dept: ORTHOPEDICS | Facility: CLINIC | Age: 68
End: 2023-05-05
Payer: MEDICARE

## 2023-05-05 DIAGNOSIS — S52.121D CLOSED DISPLACED FRACTURE OF HEAD OF RIGHT RADIUS WITH ROUTINE HEALING, SUBSEQUENT ENCOUNTER: Primary | ICD-10-CM

## 2023-05-05 PROCEDURE — 99024 PR POST-OP FOLLOW-UP VISIT: ICD-10-PCS | Mod: HCNC,S$GLB,,

## 2023-05-05 PROCEDURE — 99024 POSTOP FOLLOW-UP VISIT: CPT | Mod: HCNC,S$GLB,,

## 2023-05-05 RX ORDER — DIAZEPAM 5 MG/1
5 TABLET ORAL EVERY 6 HOURS PRN
Qty: 20 TABLET | Refills: 0 | Status: SHIPPED | OUTPATIENT
Start: 2023-05-05 | End: 2023-05-17

## 2023-05-05 NOTE — PROGRESS NOTES
Post-op Note    HPI    Kalina Ivan is here 5.5 weeks s/p the following procedure:     3/27/2023  Open reduction internal fixation right bimalleolar ankle fracture  Open reduction internal fixation right ankle syndesmosis    Overall doing okay.  She was placed in a splint last week but is concerned about insidious pain in her heel.  Denies any chest pain or shortness of breathe. Denies any drainage from the incision. Denies any fevers, chills or paresthesias.     Physical Exam:     Patient is alert and oriented no acute distress.   Assistive Device:  Wheelchair, and posterior splint    Right ankle:  Splint removed, there is bruising to the heel and moderate swelling of the foot.  Incision(s) are well healed.  There is no evidence of dehiscence.  There is no induration erythema or signs of infection.  Appropriate soft tissue swelling.  Compartments are soft and compressible.  Warm well-perfused extremity.  Cast reapplied    Imaging:     I have personally reviewed the following imaging and these are an interpretation of my findings:   Stable postsurgical change of ORIF distal fibula and tibia fractures.      Assessment    Kalina Ivan is 5 weeks Post-op     Plan:    Overall doing as expected.  We discussed expectations of surgery and postoperative course.     Pain: Continued postoperative pain regimen -- we will send in Valium for muscle spasms, can cause drowsiness  PT/OT: Continue/Initiate physical therapy (weight bearing status:  Nonweightbearing, we will keep her in the cast for another 2 weeks), she was instructed to contact the clinic with any further concerns     Follow-up: 2 weeks   X-rays next visit: none

## 2023-05-08 ENCOUNTER — TELEPHONE (OUTPATIENT)
Dept: PRIMARY CARE CLINIC | Facility: CLINIC | Age: 68
End: 2023-05-08

## 2023-05-08 ENCOUNTER — TELEPHONE (OUTPATIENT)
Dept: PRIMARY CARE CLINIC | Facility: CLINIC | Age: 68
End: 2023-05-08
Payer: MEDICARE

## 2023-05-08 ENCOUNTER — PATIENT MESSAGE (OUTPATIENT)
Dept: ORTHOPEDICS | Facility: CLINIC | Age: 68
End: 2023-05-08
Payer: MEDICARE

## 2023-05-08 NOTE — TELEPHONE ENCOUNTER
----- Message from Grzegorz Bolton MD sent at 5/8/2023  5:14 PM CDT -----  Please see if patient desires 5/10/2023 10:30 appt. (Blocked)    Grzegorz Bolton MD

## 2023-05-08 NOTE — TELEPHONE ENCOUNTER
Patient unable to connect to virtual. Informed audio visits no longer available. Reports Alternating 1000 mg tylenol with Norco at night. Instructed to avoid > 1000 q 8 hrs including tylenol in Norco. Would like increased nurse aid visits and will need follow-up in person scheduled for need.    Grzegorz Bolton MD

## 2023-05-12 ENCOUNTER — TELEPHONE (OUTPATIENT)
Dept: PRIMARY CARE CLINIC | Facility: CLINIC | Age: 68
End: 2023-05-12
Payer: MEDICARE

## 2023-05-12 NOTE — TELEPHONE ENCOUNTER
Notified Sally with home health  explaining that patient has been notified and scheduled a virtual appt. With Dr. Bolton to continue home health

## 2023-05-12 NOTE — TELEPHONE ENCOUNTER
----- Message from Maddie Bradley sent at 5/12/2023  1:54 PM CDT -----  Contact: Gautam MILLER/Sally/[phone]  Sally said that she is calling in regards to needing to get an order to extend Home health Aide visits she stated a verbal is okay or an order can be faxed to [phone]. Please advise

## 2023-05-16 ENCOUNTER — PATIENT MESSAGE (OUTPATIENT)
Dept: ORTHOPEDICS | Facility: CLINIC | Age: 68
End: 2023-05-16
Payer: MEDICARE

## 2023-05-16 DIAGNOSIS — S52.121D CLOSED DISPLACED FRACTURE OF HEAD OF RIGHT RADIUS WITH ROUTINE HEALING, SUBSEQUENT ENCOUNTER: Primary | ICD-10-CM

## 2023-05-16 DIAGNOSIS — S82.841A CLOSED BIMALLEOLAR FRACTURE OF RIGHT ANKLE, INITIAL ENCOUNTER: ICD-10-CM

## 2023-05-17 ENCOUNTER — OFFICE VISIT (OUTPATIENT)
Dept: PRIMARY CARE CLINIC | Facility: CLINIC | Age: 68
End: 2023-05-17
Payer: MEDICARE

## 2023-05-17 DIAGNOSIS — M81.0 OSTEOPOROSIS, UNSPECIFIED OSTEOPOROSIS TYPE, UNSPECIFIED PATHOLOGICAL FRACTURE PRESENCE: Primary | ICD-10-CM

## 2023-05-17 DIAGNOSIS — S82.841A CLOSED BIMALLEOLAR FRACTURE OF RIGHT ANKLE, INITIAL ENCOUNTER: Primary | ICD-10-CM

## 2023-05-17 DIAGNOSIS — S82.891D: ICD-10-CM

## 2023-05-17 DIAGNOSIS — S52.124D CLOSED NONDISPLACED FRACTURE OF HEAD OF RIGHT RADIUS WITH ROUTINE HEALING, SUBSEQUENT ENCOUNTER: ICD-10-CM

## 2023-05-17 PROCEDURE — 1160F PR REVIEW ALL MEDS BY PRESCRIBER/CLIN PHARMACIST DOCUMENTED: ICD-10-PCS | Mod: HCNC,CPTII,95, | Performed by: STUDENT IN AN ORGANIZED HEALTH CARE EDUCATION/TRAINING PROGRAM

## 2023-05-17 PROCEDURE — 1159F MED LIST DOCD IN RCRD: CPT | Mod: HCNC,CPTII,95, | Performed by: STUDENT IN AN ORGANIZED HEALTH CARE EDUCATION/TRAINING PROGRAM

## 2023-05-17 PROCEDURE — 99214 PR OFFICE/OUTPT VISIT, EST, LEVL IV, 30-39 MIN: ICD-10-PCS | Mod: HCNC,95,, | Performed by: STUDENT IN AN ORGANIZED HEALTH CARE EDUCATION/TRAINING PROGRAM

## 2023-05-17 PROCEDURE — 1160F RVW MEDS BY RX/DR IN RCRD: CPT | Mod: HCNC,CPTII,95, | Performed by: STUDENT IN AN ORGANIZED HEALTH CARE EDUCATION/TRAINING PROGRAM

## 2023-05-17 PROCEDURE — 1159F PR MEDICATION LIST DOCUMENTED IN MEDICAL RECORD: ICD-10-PCS | Mod: HCNC,CPTII,95, | Performed by: STUDENT IN AN ORGANIZED HEALTH CARE EDUCATION/TRAINING PROGRAM

## 2023-05-17 PROCEDURE — 99214 OFFICE O/P EST MOD 30 MIN: CPT | Mod: HCNC,95,, | Performed by: STUDENT IN AN ORGANIZED HEALTH CARE EDUCATION/TRAINING PROGRAM

## 2023-05-17 RX ORDER — ALENDRONATE SODIUM 70 MG/1
70 TABLET ORAL
Qty: 12 TABLET | Refills: 3 | Status: SHIPPED | OUTPATIENT
Start: 2023-05-17

## 2023-05-17 NOTE — PROGRESS NOTES
The patient location is: [place]  The chief complaint leading to consultation is: Hospitalization follow-up    Visit type: audiovisual    Face to Face time with patient: 15 minutes  21 minutes of total time spent on the encounter, which includes face to face time and non-face to face time preparing to see the patient (eg, review of tests), Obtaining and/or reviewing separately obtained history, Documenting clinical information in the electronic or other health record, Independently interpreting results (not separately reported) and communicating results to the patient/family/caregiver, or Care coordination (not separately reported).         Each patient to whom he or she provides medical services by telemedicine is:  (1) informed of the relationship between the physician and patient and the respective role of any other health care provider with respect to management of the patient; and (2) notified that he or she may decline to receive medical services by telemedicine and may withdraw from such care at any time.    Notes:     HPI:  Folow-up from recent hospitalization folowing fall with fracture to right ankle and right elbow    Patient reprots concerns with mobility and ability to safely perform outpatient PT/OT following hospital discharge.    ADLs?: Is in wheelchair currently and unable to shower safely and to make bed. Patient concerned for need for wound care following removal of boot. Initially change splint and then back to cast. Still looked raw to patient. Does have seat and handle in shower and feels unsafe to be alone with unsteadiness for transferral. Able to dress independently. Friends do come help with laundry and has meal service in place. Does rely on someone to leave home at all times though.    Concerns that she won't be able to go from cast in wheelchair to safely transferring with Uber or taxi.    Still in PT/OT, believes this has been renewed already to date.    Limited to home at this time?:  Patient reports is. Supposed to get cast off on Friday    Patient reports down to second to last alendronate refill.    Concerns that right elbow is getting stuck and unable to fully extend. Interfering with ability to use walker or rolling walker. Limited to both flexion and extension of right arm. Causing difficulty with brushing teeth and washing hair.    Review of Systems   Constitutional:  Negative for chills, diaphoresis, fatigue and fever.   Respiratory:  Negative for cough and shortness of breath.    Cardiovascular:  Negative for chest pain and palpitations.   Skin:  Positive for rash (Did follow with Dr. Ch recently on Acyclovir). Negative for wound.   Neurological:  Negative for dizziness and weakness.      Physical Exam  Constitutional:       General: She is not in acute distress.     Appearance: Normal appearance. She is not ill-appearing or diaphoretic.   HENT:      Head: Normocephalic and atraumatic.   Eyes:      General:         Right eye: No discharge.         Left eye: No discharge.      Conjunctiva/sclera: Conjunctivae normal.   Pulmonary:      Effort: Pulmonary effort is normal.   Musculoskeletal:         General: No deformity.      Cervical back: Neck supple. No rigidity.      Comments: On visual exam, suspect right arm flexion/extension limited to roughly  degrees   Skin:     Coloration: Skin is not pale.   Neurological:      Mental Status: She is alert.   Psychiatric:         Mood and Affect: Mood normal.         Behavior: Behavior normal.         Thought Content: Thought content normal.         Judgment: Judgment normal.           Plan:  Osteoporosis, unspecified osteoporosis type, unspecified pathological fracture presence  Malleolar fracture, right, closed, with routine healing, subsequent encounter  Closed nondisplaced fracture of head of right radius with routine healing, subsequent encounter  -     SUBSEQUENT HOME HEALTH ORDERS  -     alendronate (FOSAMAX) 70 MG tablet; Take 1  tablet (70 mg total) by mouth every 7 days.  Dispense: 12 tablet; Refill: 3  - Will have Home Health evaluate and treat for additional weeks of service. Suspect patient will need additional 2-3 weeks prior to plans for Outpatient Therapy. Will have patient follow-up in short run to assess progress    RTC in 2 weeks

## 2023-05-19 ENCOUNTER — OFFICE VISIT (OUTPATIENT)
Dept: ORTHOPEDICS | Facility: CLINIC | Age: 68
End: 2023-05-19
Payer: MEDICARE

## 2023-05-19 VITALS
HEART RATE: 78 BPM | HEIGHT: 64 IN | SYSTOLIC BLOOD PRESSURE: 159 MMHG | BODY MASS INDEX: 20.47 KG/M2 | DIASTOLIC BLOOD PRESSURE: 76 MMHG | WEIGHT: 119.94 LBS

## 2023-05-19 DIAGNOSIS — M70.21 OLECRANON BURSITIS OF RIGHT ELBOW: Primary | ICD-10-CM

## 2023-05-19 DIAGNOSIS — S82.841A CLOSED BIMALLEOLAR FRACTURE OF RIGHT ANKLE, INITIAL ENCOUNTER: ICD-10-CM

## 2023-05-19 PROCEDURE — 3288F FALL RISK ASSESSMENT DOCD: CPT | Mod: HCNC,CPTII,,

## 2023-05-19 PROCEDURE — 3288F PR FALLS RISK ASSESSMENT DOCUMENTED: ICD-10-PCS | Mod: HCNC,CPTII,,

## 2023-05-19 PROCEDURE — 99999 PR PBB SHADOW E&M-EST. PATIENT-LVL III: CPT | Mod: PBBFAC,HCNC,,

## 2023-05-19 PROCEDURE — 3078F PR MOST RECENT DIASTOLIC BLOOD PRESSURE < 80 MM HG: ICD-10-PCS | Mod: HCNC,CPTII,,

## 2023-05-19 PROCEDURE — 99999 PR PBB SHADOW E&M-EST. PATIENT-LVL III: ICD-10-PCS | Mod: PBBFAC,HCNC,,

## 2023-05-19 PROCEDURE — 3078F DIAST BP <80 MM HG: CPT | Mod: HCNC,CPTII,,

## 2023-05-19 PROCEDURE — 1125F PR PAIN SEVERITY QUANTIFIED, PAIN PRESENT: ICD-10-PCS | Mod: HCNC,CPTII,,

## 2023-05-19 PROCEDURE — 99024 PR POST-OP FOLLOW-UP VISIT: ICD-10-PCS | Mod: HCNC,,,

## 2023-05-19 PROCEDURE — 3077F SYST BP >= 140 MM HG: CPT | Mod: HCNC,CPTII,,

## 2023-05-19 PROCEDURE — 1159F MED LIST DOCD IN RCRD: CPT | Mod: HCNC,CPTII,,

## 2023-05-19 PROCEDURE — 1159F PR MEDICATION LIST DOCUMENTED IN MEDICAL RECORD: ICD-10-PCS | Mod: HCNC,CPTII,,

## 2023-05-19 PROCEDURE — 1101F PT FALLS ASSESS-DOCD LE1/YR: CPT | Mod: HCNC,CPTII,,

## 2023-05-19 PROCEDURE — 3008F PR BODY MASS INDEX (BMI) DOCUMENTED: ICD-10-PCS | Mod: HCNC,CPTII,,

## 2023-05-19 PROCEDURE — 99024 POSTOP FOLLOW-UP VISIT: CPT | Mod: HCNC,,,

## 2023-05-19 PROCEDURE — 1125F AMNT PAIN NOTED PAIN PRSNT: CPT | Mod: HCNC,CPTII,,

## 2023-05-19 PROCEDURE — 3077F PR MOST RECENT SYSTOLIC BLOOD PRESSURE >= 140 MM HG: ICD-10-PCS | Mod: HCNC,CPTII,,

## 2023-05-19 PROCEDURE — 1101F PR PT FALLS ASSESS DOC 0-1 FALLS W/OUT INJ PAST YR: ICD-10-PCS | Mod: HCNC,CPTII,,

## 2023-05-19 PROCEDURE — 3008F BODY MASS INDEX DOCD: CPT | Mod: HCNC,CPTII,,

## 2023-05-19 RX ORDER — DICLOFENAC SODIUM 10 MG/G
2 GEL TOPICAL 3 TIMES DAILY
Qty: 1 EACH | Refills: 1 | Status: SHIPPED | OUTPATIENT
Start: 2023-05-19 | End: 2023-09-26

## 2023-05-19 NOTE — PROGRESS NOTES
Post-op Note    HPI    Kalina Ivan is here 8 weeks s/p the following procedure:     3/28/2023  Open reduction internal fixation right bimalleolar ankle fracture  Open reduction internal fixation right ankle syndesmosis    She has been in a short-leg cast.  She continues to note medial ankle pain.  She is concerned today about her elbow as she feels she is unable to straighten it.  She was supposed to get home health for another 2-3 weeks and has friend and OT and PT who are willing to work with her for ankle and elbow conditioning and strengthening.  She continues to have significant anxiety related to walking in the postop period.  Feels she is getting inadequate OT for her elbow. Denies any chest pain or shortness of breathe. Denies any drainage from the incision. Denies any fevers, chills or paresthesias.     Does report she never got the Valium but her muscle pain subsided after her cast change about 2 weeks ago.    Regarding elbow pain, she does endorse she has been leaning her elbow against her wheelchair without padding.  Reports pain is mostly at the olecranon.      Physical Exam:     Patient is alert and oriented no acute distress.   Assistive Device: wheel chair    Right ankle:  Incision(s) are well healed.  There is no evidence of dehiscence.  There is no induration erythema or signs of infection.  Appropriate soft tissue swelling.  Compartments are soft and compressible.  Warm well-perfused extremity.     Right elbow: No tenderness to palpation radial head.  There is tenderness to palpation at olecranon with small effusion.  No infectious signs noted.    Assessment    Kalina Ivan is 8 weeks Post-op     Plan:    Overall doing as expected.  We discussed expectations of surgery and postoperative course.     Pain: Continued postoperative pain regimen -- Tylenol/Norco p.r.n.  PT/OT: Continue/Initiate physical therapy:  Patient has no restrictions regarding right elbow.  Okay to strengthen and do  isometric stretching.  She does seem to have symptoms consistent with olecranon bursitis likely secondary to resting on her wheelchair.  Ace wrap was given I do not think I would be able to drain this today as the effusion is small.  Recommended OTC Voltaren gel and continuing compression.    Regarding her right ankle, she was transitioned today into a cam walking boot.  We had a long discussion regarding expected pain and transitioning into outpatient PT.  She will continue with home health for the next few weeks and then once she is able to drive confidently we will get her into some outpatient PT.  Multiple referrals for this have been placed.    She is okay to double up on gabapentin at night reports drowsiness.  Jim hose were given.  Continue elevating.     Follow-up: 4 weeks   X-rays next visit:  Right elbow and right ankle

## 2023-05-24 NOTE — PROGRESS NOTES
Patient can be weight bearing as tolerated to right lower extremity. Progress with ROM and strength as well.     Continue PT for right elbow. No restrictions.     Fax to: [phone]

## 2023-05-31 ENCOUNTER — TELEPHONE (OUTPATIENT)
Dept: ORTHOPEDICS | Facility: CLINIC | Age: 68
End: 2023-05-31
Payer: MEDICARE

## 2023-05-31 NOTE — TELEPHONE ENCOUNTER
Spoke with tiffanie. Stated she is able to continue HH until f/u. No other questions.     ----- Message from Desi Briscoe MA sent at 5/31/2023 11:05 AM CDT -----    ----- Message -----  From: Lara Marx LPN  Sent: 5/31/2023   8:47 AM CDT  To: [facility] Clinical Support      ----- Message -----  From: Angely Gaytan  Sent: 5/31/2023   8:15 AM CDT  To: [facility] Staff    Pt home health care nurse calling  in regards to PT being extended and wanted  occupational     therapy for right arm to be extended  , just needs a verbal     [phone]  Tiffanie nurse

## 2023-06-01 ENCOUNTER — PATIENT MESSAGE (OUTPATIENT)
Dept: ORTHOPEDICS | Facility: CLINIC | Age: 68
End: 2023-06-01
Payer: MEDICARE

## 2023-06-02 ENCOUNTER — DOCUMENT SCAN (OUTPATIENT)
Dept: HOME HEALTH SERVICES | Facility: HOSPITAL | Age: 68
End: 2023-06-02
Payer: MEDICARE

## 2023-06-02 DIAGNOSIS — M70.21 OLECRANON BURSITIS OF RIGHT ELBOW: Primary | ICD-10-CM

## 2023-06-02 RX ORDER — METHYLPREDNISOLONE 4 MG/1
TABLET ORAL
Qty: 21 EACH | Refills: 0 | Status: SHIPPED | OUTPATIENT
Start: 2023-06-02 | End: 2023-06-23

## 2023-06-13 ENCOUNTER — PATIENT MESSAGE (OUTPATIENT)
Dept: ORTHOPEDICS | Facility: CLINIC | Age: 68
End: 2023-06-13
Payer: MEDICARE

## 2023-06-13 DIAGNOSIS — S82.841A CLOSED BIMALLEOLAR FRACTURE OF RIGHT ANKLE, INITIAL ENCOUNTER: Primary | ICD-10-CM

## 2023-06-15 ENCOUNTER — DOCUMENT SCAN (OUTPATIENT)
Dept: HOME HEALTH SERVICES | Facility: HOSPITAL | Age: 68
End: 2023-06-15
Payer: MEDICARE

## 2023-06-16 ENCOUNTER — PATIENT MESSAGE (OUTPATIENT)
Dept: ORTHOPEDICS | Facility: CLINIC | Age: 68
End: 2023-06-16
Payer: MEDICARE

## 2023-06-16 ENCOUNTER — PATIENT MESSAGE (OUTPATIENT)
Dept: ADMINISTRATIVE | Facility: HOSPITAL | Age: 68
End: 2023-06-16
Payer: MEDICARE

## 2023-06-16 DIAGNOSIS — S52.121D CLOSED DISPLACED FRACTURE OF HEAD OF RIGHT RADIUS WITH ROUTINE HEALING, SUBSEQUENT ENCOUNTER: ICD-10-CM

## 2023-06-16 DIAGNOSIS — S82.841A CLOSED BIMALLEOLAR FRACTURE OF RIGHT ANKLE, INITIAL ENCOUNTER: Primary | ICD-10-CM

## 2023-06-19 ENCOUNTER — OFFICE VISIT (OUTPATIENT)
Dept: ORTHOPEDICS | Facility: CLINIC | Age: 68
End: 2023-06-19
Payer: MEDICARE

## 2023-06-19 VITALS
SYSTOLIC BLOOD PRESSURE: 139 MMHG | BODY MASS INDEX: 20.63 KG/M2 | HEIGHT: 64 IN | WEIGHT: 120.81 LBS | HEART RATE: 80 BPM | DIASTOLIC BLOOD PRESSURE: 60 MMHG

## 2023-06-19 DIAGNOSIS — S82.841D CLOSED BIMALLEOLAR FRACTURE OF RIGHT ANKLE WITH ROUTINE HEALING, SUBSEQUENT ENCOUNTER: Primary | ICD-10-CM

## 2023-06-19 DIAGNOSIS — S52.121P CLOSED DISPLACED FRACTURE OF HEAD OF RIGHT RADIUS WITH MALUNION, SUBSEQUENT ENCOUNTER: ICD-10-CM

## 2023-06-19 PROCEDURE — 1100F PTFALLS ASSESS-DOCD GE2>/YR: CPT | Mod: CPTII,S$GLB,, | Performed by: ORTHOPAEDIC SURGERY

## 2023-06-19 PROCEDURE — 3075F PR MOST RECENT SYSTOLIC BLOOD PRESS GE 130-139MM HG: ICD-10-PCS | Mod: CPTII,S$GLB,, | Performed by: ORTHOPAEDIC SURGERY

## 2023-06-19 PROCEDURE — 1125F PR PAIN SEVERITY QUANTIFIED, PAIN PRESENT: ICD-10-PCS | Mod: CPTII,S$GLB,, | Performed by: ORTHOPAEDIC SURGERY

## 2023-06-19 PROCEDURE — 1100F PR PT FALLS ASSESS DOC 2+ FALLS/FALL W/INJURY/YR: ICD-10-PCS | Mod: CPTII,S$GLB,, | Performed by: ORTHOPAEDIC SURGERY

## 2023-06-19 PROCEDURE — 1159F PR MEDICATION LIST DOCUMENTED IN MEDICAL RECORD: ICD-10-PCS | Mod: CPTII,S$GLB,, | Performed by: ORTHOPAEDIC SURGERY

## 2023-06-19 PROCEDURE — 3075F SYST BP GE 130 - 139MM HG: CPT | Mod: CPTII,S$GLB,, | Performed by: ORTHOPAEDIC SURGERY

## 2023-06-19 PROCEDURE — 1125F AMNT PAIN NOTED PAIN PRSNT: CPT | Mod: CPTII,S$GLB,, | Performed by: ORTHOPAEDIC SURGERY

## 2023-06-19 PROCEDURE — 99024 PR POST-OP FOLLOW-UP VISIT: ICD-10-PCS | Mod: S$GLB,,, | Performed by: ORTHOPAEDIC SURGERY

## 2023-06-19 PROCEDURE — 3288F FALL RISK ASSESSMENT DOCD: CPT | Mod: CPTII,S$GLB,, | Performed by: ORTHOPAEDIC SURGERY

## 2023-06-19 PROCEDURE — 3288F PR FALLS RISK ASSESSMENT DOCUMENTED: ICD-10-PCS | Mod: CPTII,S$GLB,, | Performed by: ORTHOPAEDIC SURGERY

## 2023-06-19 PROCEDURE — 3078F PR MOST RECENT DIASTOLIC BLOOD PRESSURE < 80 MM HG: ICD-10-PCS | Mod: CPTII,S$GLB,, | Performed by: ORTHOPAEDIC SURGERY

## 2023-06-19 PROCEDURE — 3078F DIAST BP <80 MM HG: CPT | Mod: CPTII,S$GLB,, | Performed by: ORTHOPAEDIC SURGERY

## 2023-06-19 PROCEDURE — 3008F PR BODY MASS INDEX (BMI) DOCUMENTED: ICD-10-PCS | Mod: CPTII,S$GLB,, | Performed by: ORTHOPAEDIC SURGERY

## 2023-06-19 PROCEDURE — 1159F MED LIST DOCD IN RCRD: CPT | Mod: CPTII,S$GLB,, | Performed by: ORTHOPAEDIC SURGERY

## 2023-06-19 PROCEDURE — 99024 POSTOP FOLLOW-UP VISIT: CPT | Mod: S$GLB,,, | Performed by: ORTHOPAEDIC SURGERY

## 2023-06-19 PROCEDURE — 99999 PR PBB SHADOW E&M-EST. PATIENT-LVL III: CPT | Mod: PBBFAC,,, | Performed by: ORTHOPAEDIC SURGERY

## 2023-06-19 PROCEDURE — 99999 PR PBB SHADOW E&M-EST. PATIENT-LVL III: ICD-10-PCS | Mod: PBBFAC,,, | Performed by: ORTHOPAEDIC SURGERY

## 2023-06-19 PROCEDURE — 3008F BODY MASS INDEX DOCD: CPT | Mod: CPTII,S$GLB,, | Performed by: ORTHOPAEDIC SURGERY

## 2023-06-19 RX ORDER — MULTIVITAMIN
1 TABLET ORAL 2 TIMES DAILY
COMMUNITY
Start: 2023-04-29 | End: 2023-06-19

## 2023-06-19 NOTE — PROGRESS NOTES
Post-op Note    HPI    Kalina Ivan is here 12 weeks s/p the following procedure:     3/28/2023  Open reduction internal fixation right bimalleolar ankle fracture  Open reduction internal fixation right ankle syndesmosis    Doing well in regards to her right ankle.  She is walking in a boot.  She is in physical therapy starting this week.  No assistive device.  Her main complaint today is right elbow pain and stiffness.  Reports difficulty straightening her elbow fully.  Has not had dedicated therapy for this unfortunately.      Physical Exam:     Patient is alert and oriented no acute distress.   Assistive Device:  Boot    Right ankle:  Incision(s) are well healed.  There is no evidence of dehiscence.  There is no induration erythema or signs of infection.  Appropriate soft tissue swelling.  Compartments are soft and compressible.  Warm well-perfused extremity.  Dorsiflexed to neutral.  Mild expected soft tissue swelling.     Right elbow: No tenderness to palpation radial head.  There is tenderness to palpation at olecranon with small effusion.  No infectious signs noted.  Range of motion 15° extension to 100 flexion.  Stable to varus and valgus stress.      Right ankle x-rays ordered and reviewed by me showing healed bimalleolar ankle fracture.  No evidence of complication.  Ankle mortise maintained.    Right elbow x-rays ordered and reviewed by me showing chronic intra-articular radial head fracture    Assessment    Kalina Ivan is 12 weeks Post-op     Plan:    Treatment options discussed with her in detail.  In regards to her right ankle she is doing as expected.  She is going to start physical therapy outpatient for this.  We discussed weaning out of the boot.  We discussed compression socks for swelling.  She can wean into a regular shoe when tolerated.  In regards to her right elbow she has x-ray evidence arthrosis and a step-off of the radial head which is chronic.  Main issue is range of motion.  She  has having difficulty with full extension and flexion.  We discussed aggressive formal therapy.  She may be a candidate for radial head replacement if no continued improvement.

## 2023-06-20 ENCOUNTER — DOCUMENT SCAN (OUTPATIENT)
Dept: HOME HEALTH SERVICES | Facility: HOSPITAL | Age: 68
End: 2023-06-20
Payer: MEDICARE

## 2023-06-21 ENCOUNTER — DOCUMENT SCAN (OUTPATIENT)
Dept: HOME HEALTH SERVICES | Facility: HOSPITAL | Age: 68
End: 2023-06-21
Payer: MEDICARE

## 2023-08-07 ENCOUNTER — OFFICE VISIT (OUTPATIENT)
Dept: ORTHOPEDICS | Facility: CLINIC | Age: 68
End: 2023-08-07
Payer: MEDICARE

## 2023-08-07 VITALS
BODY MASS INDEX: 20.68 KG/M2 | DIASTOLIC BLOOD PRESSURE: 85 MMHG | SYSTOLIC BLOOD PRESSURE: 132 MMHG | HEART RATE: 80 BPM | HEIGHT: 64 IN | WEIGHT: 121.13 LBS

## 2023-08-07 DIAGNOSIS — S82.841D CLOSED BIMALLEOLAR FRACTURE OF RIGHT ANKLE WITH ROUTINE HEALING, SUBSEQUENT ENCOUNTER: ICD-10-CM

## 2023-08-07 DIAGNOSIS — S52.121P CLOSED DISPLACED FRACTURE OF HEAD OF RIGHT RADIUS WITH MALUNION, SUBSEQUENT ENCOUNTER: Primary | ICD-10-CM

## 2023-08-07 PROCEDURE — 1101F PR PT FALLS ASSESS DOC 0-1 FALLS W/OUT INJ PAST YR: ICD-10-PCS | Mod: HCNC,CPTII,S$GLB, | Performed by: ORTHOPAEDIC SURGERY

## 2023-08-07 PROCEDURE — 99999 PR PBB SHADOW E&M-EST. PATIENT-LVL III: CPT | Mod: PBBFAC,HCNC,, | Performed by: ORTHOPAEDIC SURGERY

## 2023-08-07 PROCEDURE — 99999 PR PBB SHADOW E&M-EST. PATIENT-LVL III: ICD-10-PCS | Mod: PBBFAC,HCNC,, | Performed by: ORTHOPAEDIC SURGERY

## 2023-08-07 PROCEDURE — 3288F PR FALLS RISK ASSESSMENT DOCUMENTED: ICD-10-PCS | Mod: HCNC,CPTII,S$GLB, | Performed by: ORTHOPAEDIC SURGERY

## 2023-08-07 PROCEDURE — 99213 PR OFFICE/OUTPT VISIT, EST, LEVL III, 20-29 MIN: ICD-10-PCS | Mod: HCNC,S$GLB,, | Performed by: ORTHOPAEDIC SURGERY

## 2023-08-07 PROCEDURE — 3079F DIAST BP 80-89 MM HG: CPT | Mod: HCNC,CPTII,S$GLB, | Performed by: ORTHOPAEDIC SURGERY

## 2023-08-07 PROCEDURE — 1101F PT FALLS ASSESS-DOCD LE1/YR: CPT | Mod: HCNC,CPTII,S$GLB, | Performed by: ORTHOPAEDIC SURGERY

## 2023-08-07 PROCEDURE — 3075F SYST BP GE 130 - 139MM HG: CPT | Mod: HCNC,CPTII,S$GLB, | Performed by: ORTHOPAEDIC SURGERY

## 2023-08-07 PROCEDURE — 1125F PR PAIN SEVERITY QUANTIFIED, PAIN PRESENT: ICD-10-PCS | Mod: HCNC,CPTII,S$GLB, | Performed by: ORTHOPAEDIC SURGERY

## 2023-08-07 PROCEDURE — 99213 OFFICE O/P EST LOW 20 MIN: CPT | Mod: HCNC,S$GLB,, | Performed by: ORTHOPAEDIC SURGERY

## 2023-08-07 PROCEDURE — 1125F AMNT PAIN NOTED PAIN PRSNT: CPT | Mod: HCNC,CPTII,S$GLB, | Performed by: ORTHOPAEDIC SURGERY

## 2023-08-07 PROCEDURE — 3008F PR BODY MASS INDEX (BMI) DOCUMENTED: ICD-10-PCS | Mod: HCNC,CPTII,S$GLB, | Performed by: ORTHOPAEDIC SURGERY

## 2023-08-07 PROCEDURE — 1159F PR MEDICATION LIST DOCUMENTED IN MEDICAL RECORD: ICD-10-PCS | Mod: HCNC,CPTII,S$GLB, | Performed by: ORTHOPAEDIC SURGERY

## 2023-08-07 PROCEDURE — 3008F BODY MASS INDEX DOCD: CPT | Mod: HCNC,CPTII,S$GLB, | Performed by: ORTHOPAEDIC SURGERY

## 2023-08-07 PROCEDURE — 1159F MED LIST DOCD IN RCRD: CPT | Mod: HCNC,CPTII,S$GLB, | Performed by: ORTHOPAEDIC SURGERY

## 2023-08-07 PROCEDURE — 3288F FALL RISK ASSESSMENT DOCD: CPT | Mod: HCNC,CPTII,S$GLB, | Performed by: ORTHOPAEDIC SURGERY

## 2023-08-07 PROCEDURE — 3075F PR MOST RECENT SYSTOLIC BLOOD PRESS GE 130-139MM HG: ICD-10-PCS | Mod: HCNC,CPTII,S$GLB, | Performed by: ORTHOPAEDIC SURGERY

## 2023-08-07 PROCEDURE — 3079F PR MOST RECENT DIASTOLIC BLOOD PRESSURE 80-89 MM HG: ICD-10-PCS | Mod: HCNC,CPTII,S$GLB, | Performed by: ORTHOPAEDIC SURGERY

## 2023-08-07 NOTE — PROGRESS NOTES
Post-op Note    HPI    Kalina Ivan is here 5 months s/p the following procedure:     3/28/2023  Open reduction internal fixation right bimalleolar ankle fracture  Open reduction internal fixation right ankle syndesmosis    Doing well in regards to her right ankle.  Walking regular shoes and wearing compression socks.  No pain lateral, all of her pain is medial along the medial malleolus.  Still having right elbow difficulty.  Has done formal physical therapy for right elbow contracture and radial head fracture buried making marginal improvement in regards to range of motion with fairly persistent pain.      Physical Exam:     Patient is alert and oriented no acute distress.   Assistive Device:  None    Right ankle:  Incision(s) are well healed.  There is no evidence of dehiscence.  There is no induration erythema or signs of infection.  Appropriate soft tissue swelling.  Compartments are soft and compressible.  Warm well-perfused extremity.  Dorsiflexed to 10.  Mild expected soft tissue swelling.  Tenderness over the medial malleolus.  No pain over the lateral malleolus.    Right elbow: No tenderness to palpation radial head.  There is tenderness to palpation at olecranon with small effusion.  No infectious signs noted.  Range of motion 15° extension to 120 flexion.  Stable to varus and valgus stress.        Assessment    Kalina Ivan is 5 months Post-op     Plan:    Treatment options discussed with her in detail.  In regards to her right ankle she is doing as expected.  Discussed continuation of physical therapy.  We also discussed wearing a lace-up ankle brace for times when she has increased pain especially with long distances and change in weather.  She is having some medial pain, no pain laterally.  Certainly could be some pain along the hardware.  Maybe a candidate in the future for removal of hardware medially.  In regards to her right elbow she has malunion of a right radial head fracture and  limitation in extension and fairly persistent pain over the lateral head.  Surgically her only options are radial head replacement or radial head resection.  Nonsurgically we discussed a Elvira splint for nighttime use and continuation of physical therapy.  Not interested in surgery at this time but maybe in the future if her pain persists.  I would like to see her back in 2 months for re-evaluation of her ankle and her elbow with x-rays of the right elbow and right ankle at that time

## 2023-08-09 ENCOUNTER — PES CALL (OUTPATIENT)
Dept: ADMINISTRATIVE | Facility: CLINIC | Age: 68
End: 2023-08-09
Payer: MEDICARE

## 2023-08-14 ENCOUNTER — PATIENT MESSAGE (OUTPATIENT)
Dept: PRIMARY CARE CLINIC | Facility: CLINIC | Age: 68
End: 2023-08-14
Payer: MEDICARE

## 2023-08-14 DIAGNOSIS — U07.1 COVID: Primary | ICD-10-CM

## 2023-08-24 ENCOUNTER — PATIENT MESSAGE (OUTPATIENT)
Dept: ORTHOPEDICS | Facility: CLINIC | Age: 68
End: 2023-08-24
Payer: MEDICARE

## 2023-08-29 ENCOUNTER — PATIENT MESSAGE (OUTPATIENT)
Dept: SURGERY | Facility: CLINIC | Age: 68
End: 2023-08-29
Payer: MEDICARE

## 2023-09-18 ENCOUNTER — PATIENT MESSAGE (OUTPATIENT)
Dept: PRIMARY CARE CLINIC | Facility: CLINIC | Age: 68
End: 2023-09-18
Payer: MEDICARE

## 2023-09-21 ENCOUNTER — TELEPHONE (OUTPATIENT)
Dept: PRIMARY CARE CLINIC | Facility: CLINIC | Age: 68
End: 2023-09-21
Payer: MEDICARE

## 2023-09-21 NOTE — TELEPHONE ENCOUNTER
----- Message from Elva Araujo sent at 9/21/2023  1:58 PM CDT -----  Contact: [phone]  Caller is requesting an earlier appointment then we can schedule.  Caller is requesting a message be sent to the provider.  If this is for urgent care symptoms, did you offer other providers at this location, providers at other locations, or [facility]? (yes, no, n/a):  n/a  If this is for the patients physical, did you offer to schedule next available and put on wait list, or to see NP or PA for their physical?  (yes, no, n/a):    When is the next available appointment with their provider:  11/02/23  Reason for the appointment:  thyroid level check  Patient preference of timeframe to be scheduled:  the week of 09/29/23  Would the patient like a call back, or a response through their [facility] portal?:   phone  Comments:  patient is asking for pcp to see her the week of 09/29/23, does not want to see someone else. Thank you

## 2023-09-26 ENCOUNTER — OFFICE VISIT (OUTPATIENT)
Dept: PRIMARY CARE CLINIC | Facility: CLINIC | Age: 68
End: 2023-09-26
Payer: MEDICARE

## 2023-09-26 VITALS
BODY MASS INDEX: 20.75 KG/M2 | DIASTOLIC BLOOD PRESSURE: 74 MMHG | SYSTOLIC BLOOD PRESSURE: 130 MMHG | HEART RATE: 82 BPM | WEIGHT: 121.56 LBS | TEMPERATURE: 97 F | RESPIRATION RATE: 18 BRPM | OXYGEN SATURATION: 99 % | HEIGHT: 64 IN

## 2023-09-26 DIAGNOSIS — R82.90 ABNORMAL URINE SEDIMENT: ICD-10-CM

## 2023-09-26 DIAGNOSIS — Z12.31 BREAST CANCER SCREENING BY MAMMOGRAM: ICD-10-CM

## 2023-09-26 DIAGNOSIS — Z09 FOLLOW-UP EXAM: Primary | ICD-10-CM

## 2023-09-26 DIAGNOSIS — Z79.899 OTHER LONG TERM (CURRENT) DRUG THERAPY: ICD-10-CM

## 2023-09-26 DIAGNOSIS — Z23 NEED FOR VACCINATION: ICD-10-CM

## 2023-09-26 DIAGNOSIS — Z13.6 ENCOUNTER FOR SCREENING FOR CARDIOVASCULAR DISORDERS: ICD-10-CM

## 2023-09-26 DIAGNOSIS — E03.9 HYPOTHYROIDISM, UNSPECIFIED TYPE: ICD-10-CM

## 2023-09-26 DIAGNOSIS — R53.83 FATIGUE, UNSPECIFIED TYPE: ICD-10-CM

## 2023-09-26 PROCEDURE — G0008 ADMIN INFLUENZA VIRUS VAC: HCPCS | Mod: HCNC,S$GLB,, | Performed by: STUDENT IN AN ORGANIZED HEALTH CARE EDUCATION/TRAINING PROGRAM

## 2023-09-26 PROCEDURE — 1160F RVW MEDS BY RX/DR IN RCRD: CPT | Mod: HCNC,CPTII,S$GLB, | Performed by: STUDENT IN AN ORGANIZED HEALTH CARE EDUCATION/TRAINING PROGRAM

## 2023-09-26 PROCEDURE — 3078F DIAST BP <80 MM HG: CPT | Mod: HCNC,CPTII,S$GLB, | Performed by: STUDENT IN AN ORGANIZED HEALTH CARE EDUCATION/TRAINING PROGRAM

## 2023-09-26 PROCEDURE — 99214 OFFICE O/P EST MOD 30 MIN: CPT | Mod: HCNC,25,S$GLB, | Performed by: STUDENT IN AN ORGANIZED HEALTH CARE EDUCATION/TRAINING PROGRAM

## 2023-09-26 PROCEDURE — 3288F PR FALLS RISK ASSESSMENT DOCUMENTED: ICD-10-PCS | Mod: HCNC,CPTII,S$GLB, | Performed by: STUDENT IN AN ORGANIZED HEALTH CARE EDUCATION/TRAINING PROGRAM

## 2023-09-26 PROCEDURE — 1100F PTFALLS ASSESS-DOCD GE2>/YR: CPT | Mod: HCNC,CPTII,S$GLB, | Performed by: STUDENT IN AN ORGANIZED HEALTH CARE EDUCATION/TRAINING PROGRAM

## 2023-09-26 PROCEDURE — G0008 FLU VACCINE - QUADRIVALENT - ADJUVANTED: ICD-10-PCS | Mod: HCNC,S$GLB,, | Performed by: STUDENT IN AN ORGANIZED HEALTH CARE EDUCATION/TRAINING PROGRAM

## 2023-09-26 PROCEDURE — 1159F MED LIST DOCD IN RCRD: CPT | Mod: HCNC,CPTII,S$GLB, | Performed by: STUDENT IN AN ORGANIZED HEALTH CARE EDUCATION/TRAINING PROGRAM

## 2023-09-26 PROCEDURE — 3075F SYST BP GE 130 - 139MM HG: CPT | Mod: HCNC,CPTII,S$GLB, | Performed by: STUDENT IN AN ORGANIZED HEALTH CARE EDUCATION/TRAINING PROGRAM

## 2023-09-26 PROCEDURE — 1100F PR PT FALLS ASSESS DOC 2+ FALLS/FALL W/INJURY/YR: ICD-10-PCS | Mod: HCNC,CPTII,S$GLB, | Performed by: STUDENT IN AN ORGANIZED HEALTH CARE EDUCATION/TRAINING PROGRAM

## 2023-09-26 PROCEDURE — 1126F AMNT PAIN NOTED NONE PRSNT: CPT | Mod: HCNC,CPTII,S$GLB, | Performed by: STUDENT IN AN ORGANIZED HEALTH CARE EDUCATION/TRAINING PROGRAM

## 2023-09-26 PROCEDURE — 1159F PR MEDICATION LIST DOCUMENTED IN MEDICAL RECORD: ICD-10-PCS | Mod: HCNC,CPTII,S$GLB, | Performed by: STUDENT IN AN ORGANIZED HEALTH CARE EDUCATION/TRAINING PROGRAM

## 2023-09-26 PROCEDURE — 99214 PR OFFICE/OUTPT VISIT, EST, LEVL IV, 30-39 MIN: ICD-10-PCS | Mod: HCNC,25,S$GLB, | Performed by: STUDENT IN AN ORGANIZED HEALTH CARE EDUCATION/TRAINING PROGRAM

## 2023-09-26 PROCEDURE — 99999 PR PBB SHADOW E&M-EST. PATIENT-LVL V: CPT | Mod: PBBFAC,HCNC,, | Performed by: STUDENT IN AN ORGANIZED HEALTH CARE EDUCATION/TRAINING PROGRAM

## 2023-09-26 PROCEDURE — 3008F PR BODY MASS INDEX (BMI) DOCUMENTED: ICD-10-PCS | Mod: HCNC,CPTII,S$GLB, | Performed by: STUDENT IN AN ORGANIZED HEALTH CARE EDUCATION/TRAINING PROGRAM

## 2023-09-26 PROCEDURE — 3078F PR MOST RECENT DIASTOLIC BLOOD PRESSURE < 80 MM HG: ICD-10-PCS | Mod: HCNC,CPTII,S$GLB, | Performed by: STUDENT IN AN ORGANIZED HEALTH CARE EDUCATION/TRAINING PROGRAM

## 2023-09-26 PROCEDURE — 90694 FLU VACCINE - QUADRIVALENT - ADJUVANTED: ICD-10-PCS | Mod: HCNC,S$GLB,, | Performed by: STUDENT IN AN ORGANIZED HEALTH CARE EDUCATION/TRAINING PROGRAM

## 2023-09-26 PROCEDURE — 99999 PR PBB SHADOW E&M-EST. PATIENT-LVL V: ICD-10-PCS | Mod: PBBFAC,HCNC,, | Performed by: STUDENT IN AN ORGANIZED HEALTH CARE EDUCATION/TRAINING PROGRAM

## 2023-09-26 PROCEDURE — 1160F PR REVIEW ALL MEDS BY PRESCRIBER/CLIN PHARMACIST DOCUMENTED: ICD-10-PCS | Mod: HCNC,CPTII,S$GLB, | Performed by: STUDENT IN AN ORGANIZED HEALTH CARE EDUCATION/TRAINING PROGRAM

## 2023-09-26 PROCEDURE — 3075F PR MOST RECENT SYSTOLIC BLOOD PRESS GE 130-139MM HG: ICD-10-PCS | Mod: HCNC,CPTII,S$GLB, | Performed by: STUDENT IN AN ORGANIZED HEALTH CARE EDUCATION/TRAINING PROGRAM

## 2023-09-26 PROCEDURE — 3008F BODY MASS INDEX DOCD: CPT | Mod: HCNC,CPTII,S$GLB, | Performed by: STUDENT IN AN ORGANIZED HEALTH CARE EDUCATION/TRAINING PROGRAM

## 2023-09-26 PROCEDURE — 1126F PR PAIN SEVERITY QUANTIFIED, NO PAIN PRESENT: ICD-10-PCS | Mod: HCNC,CPTII,S$GLB, | Performed by: STUDENT IN AN ORGANIZED HEALTH CARE EDUCATION/TRAINING PROGRAM

## 2023-09-26 PROCEDURE — 90694 VACC AIIV4 NO PRSRV 0.5ML IM: CPT | Mod: HCNC,S$GLB,, | Performed by: STUDENT IN AN ORGANIZED HEALTH CARE EDUCATION/TRAINING PROGRAM

## 2023-09-26 PROCEDURE — 3288F FALL RISK ASSESSMENT DOCD: CPT | Mod: HCNC,CPTII,S$GLB, | Performed by: STUDENT IN AN ORGANIZED HEALTH CARE EDUCATION/TRAINING PROGRAM

## 2023-09-26 NOTE — PROGRESS NOTES
Subjective:       Patient ID: Kalina Ivan is a 68 y.o. female.    Chief Complaint: No chief complaint on file.    HPI:  68 y.o. female presents to [facility] here for follow-up visit    Acute concerns?: Patient would like thyroid hormone levels checked with fatigue, is still in recovery and feels may be related.     Following with Dr. Pereira for right ankle fracture with internal fixation. Currently undergoing conservative therapy of malunion of right radial head with associated pain and currently using Elvira splint and continued PT. Feels with pain and concern, will need to have surgery. Will need a radial head replacement. Is booked with Dr. Whyte in [place], [place].    Patient was wondering if gabapentin is ok at night, does help with pain at night and helps to get sleep.    Had COVID about 3 weeks ago. Would like to get updated if possible.    Is up to date with tetanus vaccine.     Occasional sediment when urinating and would like evaluated.    Review of Systems   Constitutional:  Positive for fatigue (Patient feels doing well with mood overall, sometimes blue with injuries and delayed recovery. Sleep has been good on gabapentin.). Negative for chills, diaphoresis and fever.   HENT:  Negative for congestion, sinus pressure, sneezing and sore throat.    Respiratory:  Negative for cough and shortness of breath.    Cardiovascular:  Negative for chest pain and palpitations.   Gastrointestinal:  Negative for abdominal pain, diarrhea, nausea and vomiting.   Musculoskeletal:  Positive for arthralgias (right elbow, right ankle). Negative for joint swelling and myalgias.   Skin:  Negative for rash and wound.   Neurological:  Negative for dizziness, light-headedness and headaches.       Objective:      Vitals:    09/26/23 1412   BP: 130/74   BP Location: Left arm   Patient Position: Sitting   BP Method: Medium (Manual)   Pulse: 82   Resp: 18   Temp: 97.2 °F (36.2 °C)   TempSrc: Temporal   SpO2: 99%   Weight: 55.2 kg (121  "lb 9.3 oz)   Height: 5' 4" (1.626 m)     Physical Exam  Vitals reviewed.   Constitutional:       General: She is not in acute distress.     Appearance: Normal appearance. She is not ill-appearing.   HENT:      Head: Normocephalic and atraumatic.   Eyes:      General:         Right eye: No discharge.         Left eye: No discharge.      Conjunctiva/sclera: Conjunctivae normal.   Neck:      Thyroid: No thyroid mass, thyromegaly or thyroid tenderness.   Cardiovascular:      Rate and Rhythm: Normal rate and regular rhythm.      Heart sounds: Normal heart sounds. No murmur heard.  Pulmonary:      Effort: Pulmonary effort is normal.      Breath sounds: Normal breath sounds.   Musculoskeletal:         General: No deformity.      Cervical back: Neck supple. No rigidity.   Lymphadenopathy:      Cervical: No cervical adenopathy.   Skin:     General: Skin is warm and dry.      Coloration: Skin is not jaundiced.   Neurological:      General: No focal deficit present.      Mental Status: She is alert and oriented to person, place, and time.   Psychiatric:         Mood and Affect: Mood normal.         Behavior: Behavior normal.             Lab Results   Component Value Date     03/23/2023    K 4.0 03/23/2023     03/23/2023    CO2 25 03/23/2023    BUN 13 03/23/2023    CREATININE 0.8 03/23/2023    GLUCOSE 99 02/03/2022    ANIONGAP 11 03/23/2023     Lab Results   Component Value Date    HGBA1C 5.4 08/15/2022     No results found for: "BNP", "BNPTRIAGEBLO"    Lab Results   Component Value Date    WBC 7.35 03/26/2023    HGB 12.0 03/26/2023    HCT 36.7 (L) 03/26/2023     03/26/2023    GRAN 3.8 03/26/2023    GRAN 51.3 03/26/2023     Lab Results   Component Value Date    CHOL 237 (H) 06/21/2022    HDL 67 06/21/2022    LDLCALC 146.6 06/21/2022    TRIG 117 06/21/2022          Current Outpatient Medications:     alendronate (FOSAMAX) 70 MG tablet, Take 1 tablet (70 mg total) by mouth every 7 days., Disp: 12 tablet, Rfl: " 3    calcium-vitamin D 600 mg-10 mcg (400 unit) Tab, TAKE 1 TABLET BY MOUTH TWICE A DAY, Disp: 180 tablet, Rfl: 3    gabapentin (NEURONTIN) 300 MG capsule, Take 1 capsule (300 mg total) by mouth 3 (three) times daily. (Patient taking differently: Take 300 mg by mouth once daily.), Disp: 90 capsule, Rfl: 1    pantoprazole (PROTONIX) 40 MG tablet, TAKE 1 TABLET BY MOUTH EVERY DAY, Disp: 90 tablet, Rfl: 1    thyroid, pork, (ARMOUR THYROID) 15 mg Tab, Take 3 tablets (45 mg total) by mouth before breakfast., Disp: 270 tablet, Rfl: 3    valACYclovir (VALTREX) 1000 MG tablet, FOR INTIAL OUTBREAK: TAKE ONE PILL TWICE DAILY X 7 DAYS., Disp: , Rfl:         Assessment:       1. Follow-up exam    2. Hypothyroidism, unspecified type    3. Breast cancer screening by mammogram    4. Encounter for screening for cardiovascular disorders    5. Need for vaccination    6. Fatigue, unspecified type    7. Other long term (current) drug therapy    8. Abnormal urine sediment           Plan:       Follow-up exam    Hypothyroidism, unspecified type  Fatigue, unspecified type  -     VITAMIN B12; Future; Expected date: 09/26/2023  -     TSH; Future; Expected date: 09/26/2023  -     T4, Free; Future; Expected date: 09/26/2023  -     T3; Future; Expected date: 09/26/2023    Breast cancer screening by mammogram  -     Mammo Digital Screening Bilat; Future; Expected date: 09/26/2023    Encounter for screening for cardiovascular disorders  -     Lipid Panel; Future; Expected date: 09/26/2023    Need for vaccination  -     Influenza (FLUAD) - Quadrivalent (Adjuvanted) *Preferred* (65+) (PF)    Abnormal urine sediment  -     URINALYSIS; Future; Expected date: 09/26/2023    RTC in 6 months

## 2023-09-27 ENCOUNTER — PATIENT MESSAGE (OUTPATIENT)
Dept: ORTHOPEDICS | Facility: CLINIC | Age: 68
End: 2023-09-27
Payer: MEDICARE

## 2023-09-27 ENCOUNTER — HOSPITAL ENCOUNTER (OUTPATIENT)
Dept: RADIOLOGY | Facility: HOSPITAL | Age: 68
Discharge: HOME | End: 2023-09-27
Attending: STUDENT IN AN ORGANIZED HEALTH CARE EDUCATION/TRAINING PROGRAM
Payer: MEDICARE

## 2023-09-27 DIAGNOSIS — Z12.31 BREAST CANCER SCREENING BY MAMMOGRAM: ICD-10-CM

## 2023-09-27 PROCEDURE — 77063 BREAST TOMOSYNTHESIS BI: CPT | Mod: 26,HCNC,, | Performed by: RADIOLOGY

## 2023-09-27 PROCEDURE — 77067 SCR MAMMO BI INCL CAD: CPT | Mod: 26,HCNC,, | Performed by: RADIOLOGY

## 2023-09-27 PROCEDURE — 77063 MAMMO DIGITAL SCREENING BILAT WITH TOMO: ICD-10-PCS | Mod: 26,HCNC,, | Performed by: RADIOLOGY

## 2023-09-27 PROCEDURE — 77067 SCR MAMMO BI INCL CAD: CPT | Mod: TC,HCNC

## 2023-09-27 PROCEDURE — 77067 MAMMO DIGITAL SCREENING BILAT WITH TOMO: ICD-10-PCS | Mod: 26,HCNC,, | Performed by: RADIOLOGY

## 2023-10-02 ENCOUNTER — OFFICE VISIT (OUTPATIENT)
Dept: ORTHOPEDICS | Facility: CLINIC | Age: 68
End: 2023-10-02
Payer: MEDICARE

## 2023-10-02 VITALS
HEIGHT: 64 IN | WEIGHT: 120.69 LBS | SYSTOLIC BLOOD PRESSURE: 118 MMHG | BODY MASS INDEX: 20.6 KG/M2 | HEART RATE: 71 BPM | DIASTOLIC BLOOD PRESSURE: 68 MMHG

## 2023-10-02 DIAGNOSIS — S82.841D CLOSED BIMALLEOLAR FRACTURE OF RIGHT ANKLE WITH ROUTINE HEALING, SUBSEQUENT ENCOUNTER: ICD-10-CM

## 2023-10-02 DIAGNOSIS — Z51.81 MEDICATION MONITORING ENCOUNTER: Primary | ICD-10-CM

## 2023-10-02 DIAGNOSIS — S52.121P CLOSED DISPLACED FRACTURE OF HEAD OF RIGHT RADIUS WITH MALUNION, SUBSEQUENT ENCOUNTER: Primary | ICD-10-CM

## 2023-10-02 DIAGNOSIS — S82.841D CLOSED BIMALLEOLAR FRACTURE OF RIGHT ANKLE WITH ROUTINE HEALING, SUBSEQUENT ENCOUNTER: Primary | ICD-10-CM

## 2023-10-02 DIAGNOSIS — S52.121P CLOSED DISPLACED FRACTURE OF HEAD OF RIGHT RADIUS WITH MALUNION, SUBSEQUENT ENCOUNTER: ICD-10-CM

## 2023-10-02 PROCEDURE — 3288F PR FALLS RISK ASSESSMENT DOCUMENTED: ICD-10-PCS | Mod: HCNC,CPTII,S$GLB, | Performed by: ORTHOPAEDIC SURGERY

## 2023-10-02 PROCEDURE — 3074F PR MOST RECENT SYSTOLIC BLOOD PRESSURE < 130 MM HG: ICD-10-PCS | Mod: HCNC,CPTII,S$GLB, | Performed by: ORTHOPAEDIC SURGERY

## 2023-10-02 PROCEDURE — 99999 PR PBB SHADOW E&M-EST. PATIENT-LVL III: ICD-10-PCS | Mod: PBBFAC,HCNC,, | Performed by: ORTHOPAEDIC SURGERY

## 2023-10-02 PROCEDURE — 99999 PR PBB SHADOW E&M-EST. PATIENT-LVL III: CPT | Mod: PBBFAC,HCNC,, | Performed by: ORTHOPAEDIC SURGERY

## 2023-10-02 PROCEDURE — 99214 PR OFFICE/OUTPT VISIT, EST, LEVL IV, 30-39 MIN: ICD-10-PCS | Mod: HCNC,S$GLB,, | Performed by: ORTHOPAEDIC SURGERY

## 2023-10-02 PROCEDURE — 99214 OFFICE O/P EST MOD 30 MIN: CPT | Mod: HCNC,S$GLB,, | Performed by: ORTHOPAEDIC SURGERY

## 2023-10-02 PROCEDURE — 1101F PT FALLS ASSESS-DOCD LE1/YR: CPT | Mod: HCNC,CPTII,S$GLB, | Performed by: ORTHOPAEDIC SURGERY

## 2023-10-02 PROCEDURE — 1101F PR PT FALLS ASSESS DOC 0-1 FALLS W/OUT INJ PAST YR: ICD-10-PCS | Mod: HCNC,CPTII,S$GLB, | Performed by: ORTHOPAEDIC SURGERY

## 2023-10-02 PROCEDURE — 3078F PR MOST RECENT DIASTOLIC BLOOD PRESSURE < 80 MM HG: ICD-10-PCS | Mod: HCNC,CPTII,S$GLB, | Performed by: ORTHOPAEDIC SURGERY

## 2023-10-02 PROCEDURE — 3008F BODY MASS INDEX DOCD: CPT | Mod: HCNC,CPTII,S$GLB, | Performed by: ORTHOPAEDIC SURGERY

## 2023-10-02 PROCEDURE — 3008F PR BODY MASS INDEX (BMI) DOCUMENTED: ICD-10-PCS | Mod: HCNC,CPTII,S$GLB, | Performed by: ORTHOPAEDIC SURGERY

## 2023-10-02 PROCEDURE — 1159F PR MEDICATION LIST DOCUMENTED IN MEDICAL RECORD: ICD-10-PCS | Mod: HCNC,CPTII,S$GLB, | Performed by: ORTHOPAEDIC SURGERY

## 2023-10-02 PROCEDURE — 1125F AMNT PAIN NOTED PAIN PRSNT: CPT | Mod: HCNC,CPTII,S$GLB, | Performed by: ORTHOPAEDIC SURGERY

## 2023-10-02 PROCEDURE — 1125F PR PAIN SEVERITY QUANTIFIED, PAIN PRESENT: ICD-10-PCS | Mod: HCNC,CPTII,S$GLB, | Performed by: ORTHOPAEDIC SURGERY

## 2023-10-02 PROCEDURE — 3078F DIAST BP <80 MM HG: CPT | Mod: HCNC,CPTII,S$GLB, | Performed by: ORTHOPAEDIC SURGERY

## 2023-10-02 PROCEDURE — 3288F FALL RISK ASSESSMENT DOCD: CPT | Mod: HCNC,CPTII,S$GLB, | Performed by: ORTHOPAEDIC SURGERY

## 2023-10-02 PROCEDURE — 3074F SYST BP LT 130 MM HG: CPT | Mod: HCNC,CPTII,S$GLB, | Performed by: ORTHOPAEDIC SURGERY

## 2023-10-02 PROCEDURE — 1159F MED LIST DOCD IN RCRD: CPT | Mod: HCNC,CPTII,S$GLB, | Performed by: ORTHOPAEDIC SURGERY

## 2023-10-02 NOTE — PROGRESS NOTES
Post-op Note    HPI    Kalina Ivan is here 6 months s/p the following procedure:     3/28/2023  Open reduction internal fixation right bimalleolar ankle fracture  Open reduction internal fixation right ankle syndesmosis    Doing well in regards to her right ankle.  Walking regular shoes and wearing compression socks.  Pain 2/10.  Most of her issue still come from the right elbow.  She has had a another opinion by hand and elbow specialist, Dr. Whyte.  Patient also was recommended for radial head replacement.  She is contemplating this currently.      Physical Exam:     Patient is alert and oriented no acute distress.   Assistive Device:  None    Right ankle:  Incision(s) are well healed.  There is no evidence of dehiscence.  There is no induration erythema or signs of infection.  Appropriate soft tissue swelling.  Compartments are soft and compressible.  Warm well-perfused extremity.  Dorsiflexed to 10.  Mild expected soft tissue swelling.  Tenderness over the medial malleolus.  No pain over the lateral malleolus.    Right elbow: No tenderness to palpation radial head.  There is tenderness to palpation at olecranon with small effusion.  No infectious signs noted.  Range of motion 15° extension to 120 flexion.  Stable to varus and valgus stress.    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  Right ankle x-rays show well healed trimalleolar ankle fracture without complication    Assessment    Kalina Ivan is 6 months Post-op     Plan:    Treatment options discussed with her in detail.  In regards to her right ankle she is doing as expected.  Discussed continuation of physical therapy.  In regards to her elbow she has had no significant improvement with conservative treatment.  Do recommend radial head replacement with possible addressing of the capitellum osteochondral defect.  Patient has already been seen by  Dr. anne for this as a 2nd opinion.  I agree to go ahead proceed with surgery.  Patient can follow up as needed with me for ankle

## 2023-10-10 ENCOUNTER — PATIENT OUTREACH (OUTPATIENT)
Dept: ADMINISTRATIVE | Facility: HOSPITAL | Age: 68
End: 2023-10-10
Payer: MEDICARE

## 2023-10-10 NOTE — LETTER
AUTHORIZATION FOR RELEASE OF   CONFIDENTIAL INFORMATION    Dear Medical Records,    We are seeing Kalina Ivan, date of birth 1955, in the clinic at [facility]. Grzegorz Bolton MD is the patient's PCP. Kalina Ivan has an outstanding lab/procedure at the time we reviewed her chart. In order to help keep her health information updated, she has authorized us to request the following medical record(s):        (  )  MAMMOGRAM                                      (X)  COLONOSCOPY      (  )  PAP SMEAR                                          (  )  OUTSIDE LAB RESULTS     (  )  DEXA SCAN                                          (  )  EYE EXAM            (  )  FOOT EXAM                                          (  )  ENTIRE RECORD     (  )  OUTSIDE IMMUNIZATIONS                 (  )  _______________       ATTN: MARIANO      Please fax records to Grzegorz Bolton MD, [phone]     If you have any questions, please call  [phone].           Patient Name: Kalina Ivan  : 1955  Patient Phone #: [phone]

## 2023-10-10 NOTE — PROGRESS NOTES
Health Maintenance Due   Topic Date Due    Hepatitis C Screening  Never done    COVID-19 Vaccine (6 - 2023-24 season) 09/01/2023     Immunizations - reviewed and updated   Care Everywhere - triggered   Care Teams - updated   Outreach - C[facility]IA sent to [facility] for most recent colonoscopy record. Per chart, patient has cscope done in 2021.

## 2023-10-13 ENCOUNTER — PATIENT OUTREACH (OUTPATIENT)
Dept: ADMINISTRATIVE | Facility: HOSPITAL | Age: 68
End: 2023-10-13
Payer: MEDICARE

## 2023-10-13 NOTE — PROGRESS NOTES
Health Maintenance Due   Topic Date Due    Hepatitis C Screening  Never done    COVID-19 Vaccine (6 - 2023-24 season) 09/01/2023     Immunizations - reviewed and updated   Care Everywhere - triggered   Care Teams - updated   Outreach - CASSIA sent to Dr. Ferrell and [facility] for most recent colonoscopy records.

## 2023-10-13 NOTE — LETTER
AUTHORIZATION FOR RELEASE OF   CONFIDENTIAL INFORMATION    Dear Dr. Ferrell,    We are seeing Kalina Ivan, date of birth 1955, in the clinic at [facility]. Grzegorz Bolton MD is the patient's PCP. Kalina Ivan has an outstanding lab/procedure at the time we reviewed her chart. In order to help keep her health information updated, she has authorized us to request the following medical record(s):        (  )  MAMMOGRAM                                      (X)  COLONOSCOPY      (  )  PAP SMEAR                                          (  )  OUTSIDE LAB RESULTS     (  )  DEXA SCAN                                          (  )  EYE EXAM            (  )  FOOT EXAM                                          (  )  ENTIRE RECORD     (  )  OUTSIDE IMMUNIZATIONS                 (  )  _______________       ATTN: MARIANO     Please fax records to Grzegorz Bolton MD, [phone]     If you have any questions, please contact Mariano at [phone].           Patient Name: Kalina Ivan  : 1955  Patient Phone #: [phone]

## 2023-10-13 NOTE — LETTER
AUTHORIZATION FOR RELEASE OF   CONFIDENTIAL INFORMATION    Dear Medical Records,    We are seeing Kalina Ivan, date of birth 1955, in the clinic at [facility]. Grzegorz Bolton MD is the patient's PCP. Kalina Ivan has an outstanding lab/procedure at the time we reviewed her chart. In order to help keep her health information updated, she has authorized us to request the following medical record(s):        (  )  MAMMOGRAM                                      (X)  COLONOSCOPY      (  )  PAP SMEAR                                          (  )  OUTSIDE LAB RESULTS     (  )  DEXA SCAN                                          (  )  EYE EXAM            (  )  FOOT EXAM                                          (  )  ENTIRE RECORD     (  )  OUTSIDE IMMUNIZATIONS                 (  )  _______________        ATTN: MARIANO     Please fax records to Grzegorz Bolton MD, [phone]     If you have any questions, please contact Mariano at [phone].           Patient Name: Kalina Ivan  : 1955  Patient Phone #: [phone]

## 2023-10-18 ENCOUNTER — PATIENT MESSAGE (OUTPATIENT)
Dept: CARDIOLOGY | Facility: CLINIC | Age: 68
End: 2023-10-18
Payer: MEDICARE

## 2023-11-13 ENCOUNTER — PATIENT MESSAGE (OUTPATIENT)
Dept: ORTHOPEDICS | Facility: CLINIC | Age: 68
End: 2023-11-13
Payer: MEDICARE

## 2023-11-16 ENCOUNTER — TELEPHONE (OUTPATIENT)
Dept: ORTHOPEDICS | Facility: CLINIC | Age: 68
End: 2023-11-16

## 2023-11-16 NOTE — TELEPHONE ENCOUNTER
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   SMO by Dr. Whyte and Dr. Pereira. DOI 3.23.23. Sent message to Dr. Hou for review

## 2023-11-21 ENCOUNTER — OFFICE VISIT (OUTPATIENT)
Dept: HOME HEALTH SERVICES | Facility: CLINIC | Age: 68
End: 2023-11-21
Payer: MEDICARE

## 2023-11-21 VITALS
HEART RATE: 68 BPM | RESPIRATION RATE: 16 BRPM | OXYGEN SATURATION: 98 % | HEIGHT: 64 IN | DIASTOLIC BLOOD PRESSURE: 70 MMHG | WEIGHT: 120 LBS | SYSTOLIC BLOOD PRESSURE: 110 MMHG | BODY MASS INDEX: 20.49 KG/M2

## 2023-11-21 DIAGNOSIS — Z00.00 ENCOUNTER FOR MEDICARE ANNUAL WELLNESS EXAM: ICD-10-CM

## 2023-11-21 DIAGNOSIS — Z00.00 ENCOUNTER FOR PREVENTIVE HEALTH EXAMINATION: Primary | ICD-10-CM

## 2023-11-21 DIAGNOSIS — M81.0 OSTEOPOROSIS, UNSPECIFIED OSTEOPOROSIS TYPE, UNSPECIFIED PATHOLOGICAL FRACTURE PRESENCE: ICD-10-CM

## 2023-11-21 DIAGNOSIS — E03.9 HYPOTHYROIDISM, UNSPECIFIED TYPE: ICD-10-CM

## 2023-11-21 PROCEDURE — 1159F PR MEDICATION LIST DOCUMENTED IN MEDICAL RECORD: ICD-10-PCS | Mod: CPTII,S$GLB,, | Performed by: INTERNAL MEDICINE

## 2023-11-21 PROCEDURE — 1170F FXNL STATUS ASSESSED: CPT | Mod: CPTII,S$GLB,, | Performed by: INTERNAL MEDICINE

## 2023-11-21 PROCEDURE — 1160F PR REVIEW ALL MEDS BY PRESCRIBER/CLIN PHARMACIST DOCUMENTED: ICD-10-PCS | Mod: CPTII,S$GLB,, | Performed by: INTERNAL MEDICINE

## 2023-11-21 PROCEDURE — 99499 NO LOS: ICD-10-PCS | Mod: S$GLB,,, | Performed by: INTERNAL MEDICINE

## 2023-11-21 PROCEDURE — G9919 PR SCREENING AND POSITIVE: ICD-10-PCS | Mod: CPTII,S$GLB,, | Performed by: INTERNAL MEDICINE

## 2023-11-21 PROCEDURE — 1158F ADVNC CARE PLAN TLK DOCD: CPT | Mod: CPTII,S$GLB,, | Performed by: INTERNAL MEDICINE

## 2023-11-21 PROCEDURE — G9919 SCRN ND POS ND PROV OF REC: HCPCS | Mod: CPTII,S$GLB,, | Performed by: INTERNAL MEDICINE

## 2023-11-21 PROCEDURE — 99499 UNLISTED E&M SERVICE: CPT | Mod: S$GLB,,, | Performed by: INTERNAL MEDICINE

## 2023-11-21 PROCEDURE — 1125F PR PAIN SEVERITY QUANTIFIED, PAIN PRESENT: ICD-10-PCS | Mod: CPTII,S$GLB,, | Performed by: INTERNAL MEDICINE

## 2023-11-21 PROCEDURE — 1125F AMNT PAIN NOTED PAIN PRSNT: CPT | Mod: CPTII,S$GLB,, | Performed by: INTERNAL MEDICINE

## 2023-11-21 PROCEDURE — 3078F PR MOST RECENT DIASTOLIC BLOOD PRESSURE < 80 MM HG: ICD-10-PCS | Mod: CPTII,S$GLB,, | Performed by: INTERNAL MEDICINE

## 2023-11-21 PROCEDURE — 3074F SYST BP LT 130 MM HG: CPT | Mod: CPTII,S$GLB,, | Performed by: INTERNAL MEDICINE

## 2023-11-21 PROCEDURE — 1159F MED LIST DOCD IN RCRD: CPT | Mod: CPTII,S$GLB,, | Performed by: INTERNAL MEDICINE

## 2023-11-21 PROCEDURE — 1158F PR ADVANCE CARE PLANNING DISCUSS DOCUMENTED IN MEDICAL RECORD: ICD-10-PCS | Mod: CPTII,S$GLB,, | Performed by: INTERNAL MEDICINE

## 2023-11-21 PROCEDURE — 1170F PR FUNCTIONAL STATUS ASSESSED: ICD-10-PCS | Mod: CPTII,S$GLB,, | Performed by: INTERNAL MEDICINE

## 2023-11-21 PROCEDURE — 1160F RVW MEDS BY RX/DR IN RCRD: CPT | Mod: CPTII,S$GLB,, | Performed by: INTERNAL MEDICINE

## 2023-11-21 PROCEDURE — 3074F PR MOST RECENT SYSTOLIC BLOOD PRESSURE < 130 MM HG: ICD-10-PCS | Mod: CPTII,S$GLB,, | Performed by: INTERNAL MEDICINE

## 2023-11-21 PROCEDURE — 3078F DIAST BP <80 MM HG: CPT | Mod: CPTII,S$GLB,, | Performed by: INTERNAL MEDICINE

## 2023-11-21 NOTE — PATIENT INSTRUCTIONS
Counseling and Referral of Other Preventative  (Italic type indicates deductible and co-insurance are waived)    Patient Name: Kalina Ivan  Today's Date: 11/21/2023    Health Maintenance       Date Due Completion Date    Hepatitis C Screening Never done ---    RSV Vaccine (Age 60+ and Pregnant patients) (1 - 1-dose 60+ series) Never done ---    DEXA Scan 09/22/2024 9/22/2021    Mammogram 09/27/2024 9/27/2023    Lipid Panel 09/27/2024 9/27/2023    TETANUS VACCINE 06/03/2031 6/3/2021    Colorectal Cancer Screening 06/30/2031 ---        No orders of the defined types were placed in this encounter.    The following information is provided to all patients.  This information is to help you find resources for any of the problems found today that may be affecting your health:                Living healthy guide: www.[facility].[place].gov      Understanding Diabetes: www.diabetes.org      Eating healthy: www.cdc.gov/healthyweight      [facility] home safety checklist: www.cdc.gov/steadi/patient.html      Agency on Aging: www.goea.[place].[facility]      Alcoholics anonymous (AA): www.aa.org      Physical Activity: www.alva.nih.gov/ig8wzrc      Tobacco use: www.quitwithusla.org

## 2023-11-21 NOTE — PROGRESS NOTES
"  Kalina Ivan presented for a  Medicare AWV and comprehensive Health Risk Assessment today. The following components were reviewed and updated:    Medical history  Family History  Social history  Allergies and Current Medications  Health Risk Assessment  Health Maintenance  Care Team     Patient screened moderate and/or high risk for one or more social determinants of health (SDOH). Patient connected to community resources through the ED Navigator.      ** See Completed Assessments for Annual Wellness Visit within the encounter summary.**         The following assessments were completed:  Living Situation  CAGE  Depression Screening  Timed Get Up and Go  Whisper Test  Cognitive Function Screening    Nutrition Screening  ADL Screening  PAQ Screening        Vitals:    11/21/23 1058   BP: 110/70   Pulse: 68   Resp: 16   SpO2: 98%   Weight: 54.4 kg (120 lb)   Height: 5' 4" (1.626 m)     Body mass index is 20.6 kg/m².  Physical Exam  Vitals reviewed.   Constitutional:       Appearance: Normal appearance.   HENT:      Head: Normocephalic.   Cardiovascular:      Rate and Rhythm: Normal rate.      Pulses: Normal pulses.   Pulmonary:      Effort: Pulmonary effort is normal.   Musculoskeletal:         General: Normal range of motion.      Cervical back: Normal range of motion.   Skin:     General: Skin is warm and dry.   Neurological:      Mental Status: She is alert and oriented to person, place, and time.   Psychiatric:         Mood and Affect: Mood normal.         Behavior: Behavior normal.         Thought Content: Thought content normal.         Judgment: Judgment normal.               Diagnoses and health risks identified today and associated recommendations/orders:    1. Encounter for preventive health examination  Assessments completed.  HM recommendations reviewed.  F/u with PCP as instructed.         2. Hypothyroidism, unspecified type  Chronic, stable on current regimen. Followed by PCP      3. Osteoporosis, " unspecified osteoporosis type, unspecified pathological fracture presence  Chronic, stable on current regimen. Followed by PCP      4. Encounter for Medicare annual wellness exam    - Ambulatory Referral/Consult to Enhanced Annual Wellness Visit (eAWV)      Provided Kalina with a 5-10 year written screening schedule and personal prevention plan. Recommendations were developed using the USPSTF age appropriate recommendations. Education, counseling, and referrals were provided as needed. After Visit Summary printed and given to patient which includes a list of additional screenings\tests needed.    Follow up in about 1 year (around 11/21/2024) for Medicare AWV.    Mere Wne NP  I offered to discuss advanced care planning, including how to pick a person who would make decisions for you if you were unable to make them for yourself, called a health care power of , and what kind of decisions you might make such as use of life sustaining treatments such as ventilators and tube feeding when faced with a life limiting illness recorded on a living will that they will need to know. (How you want to be cared for as you near the end of your natural life)     X Patient is interested in learning more about how to make advanced directives.  I provided them paperwork and offered to discuss this with them.

## 2023-12-19 ENCOUNTER — PATIENT OUTREACH (OUTPATIENT)
Dept: ADMINISTRATIVE | Facility: HOSPITAL | Age: 68
End: 2023-12-19
Payer: MEDICARE

## 2023-12-19 NOTE — LETTER
AUTHORIZATION FOR RELEASE OF   CONFIDENTIAL INFORMATION    Dear Dr. Lowry,    We are seeing Kalina Ivan, date of birth 1955, in the clinic at [facility]. Grzegorz Bolton MD is the patient's PCP. Kalina Ivan has an outstanding lab/procedure at the time we reviewed her chart. In order to help keep her health information updated, she has authorized us to request the following medical record(s):        (  )  MAMMOGRAM                                      (X)  COLONOSCOPY      (  )  PAP SMEAR                                          (  )  OUTSIDE LAB RESULTS     (  )  DEXA SCAN                                          (  )  EYE EXAM            (  )  FOOT EXAM                                          (  )  ENTIRE RECORD     (  )  OUTSIDE IMMUNIZATIONS                 (  )  _______________       ATTN: MARIANO      Please fax records to Grzegorz Bolton MD, [phone]     If you have any questions, please contact Mariano at [phone].           Patient Name: Kalina Ivan  : 1955  Patient Phone #: [phone]

## 2023-12-19 NOTE — PROGRESS NOTES
Health Maintenance Due   Topic Date Due    Hepatitis C Screening  Never done    RSV Vaccine (Age 60+ and Pregnant patients) (1 - 1-dose 60+ series) Never done     Population Health Chart Review & Patient Outreach Details      Further Action Needed If Patient Returns Outreach:      [place] Gap Report  reviewed, CASSIA sent to Dr. Lowry for most recent colonoscopy records. Per chart, patient had colonoscopy done in .       Updates Requested / Reviewed:     [x]  Care Everywhere    [x]     []  External Sources ([facility], [facility], DIS, etc.)    [] [facility]   [] [facility]   [] Other:    [x]  Care Team Updated   []  Removed  or Duplicate Orders   [x]  Immunization Reconciliation Completed / Queried    [x] [place]   [] [place]   [] [place]   [] [place]      Health Maintenance Topics Addressed and Outreach Outcomes / Actions Taken:             Breast Cancer Screening []  Mammogram Order Placed    []  Mammogram Screening Scheduled    []  External Records Requested & Care Team Updated if Applicable    []  External Records Uploaded & Care Team Updated if Applicable    []  Pt Declined Scheduling Mammogram    []  Pt Will Schedule with External Provider / Order Routed & Care Team Updated if Applicable              Cervical Cancer Screening []  Pap Smear Scheduled in Primary Care or OBGYN    []  External Records Requested & Care Team Updated if Applicable       []  External Records Uploaded, Care Team Updated, & History Updated if Applicable    []  Patient Declined Scheduling Pap Smear    []  Patient Will Schedule with External Provider & Care Team Updated if Applicable                  Colorectal Cancer Screening []  Colonoscopy Case Request / Referral / Home Test Order Placed    [x]  External Records Requested & Care Team Updated if Applicable    []  External Records Uploaded, Care Team Updated, & History Updated if Applicable    []  Patient Declined Completing Colon Cancer Screening    []  Patient Will  Schedule with External Provider & Care Team Updated if Applicable    []  Fit Kit Mailed (add the SmartPhrase under additional notes)    []  Reminded Patient to Complete Home Test                Diabetic Eye Exam []  Eye Exam Screening Order Placed    []  Eye Camera Scheduled or Optometry/Ophthalmology Referral Placed    []  External Records Requested & Care Team Updated if Applicable    []  External Records Uploaded, Care Team Updated, & History Updated if Applicable    []  Patient Declined Scheduling Eye Exam    []  Patient Will Schedule with External Provider & Care Team Updated if Applicable             Blood Pressure Control []  Primary Care Follow Up Visit Scheduled     []  Remote Blood Pressure Reading Captured    []  Patient Declined Remote Reading or Scheduling Appt - Escalated to PCP    []  Patient Will Call Back or Send Portal Message with Reading                 HbA1c & Other Labs []  Overdue Lab(s) Ordered    []  Overdue Lab(s) Scheduled    []  External Records Uploaded & Care Team Updated if Applicable    []  Primary Care Follow Up Visit Scheduled     []  Reminded Patient to Complete A1c Home Test    []  Patient Declined Scheduling Labs or Will Call Back to Schedule    []  Patient Will Schedule with External Provider / Order Routed, & Care Team Updated if Applicable           Primary Care Appointment []  Primary Care Appt Scheduled    []  Patient Declined Scheduling or Will Call Back to Schedule    []  Pt Established with External Provider, Updated Care Team, & Informed Pt to Notify Payor if Applicable           Medication Adherence /    Statin Use []  Primary Care Appointment Scheduled    []  Patient Reminded to  Prescription    []  Patient Declined, Provider Notified if Needed    []  Sent Provider Message to Review to Evaluate Pt for Statin, Add Exclusion Dx Codes, Document   Exclusion in Problem List, Change Statin Intensity Level to Moderate or High Intensity if Applicable                 Osteoporosis Screening []  Dexa Order Placed    []  Dexa Appointment Scheduled    []  External Records Requested & Care Team Updated    []  External Records Uploaded, Care Team Updated, & History Updated if Applicable    []  Patient Declined Scheduling Dexa or Will Call Back to Schedule    []  Patient Will Schedule with External Provider / Order Routed & Care Team Updated if Applicable       Additional Notes:

## 2024-02-03 DIAGNOSIS — E03.9 HYPOTHYROIDISM, UNSPECIFIED TYPE: ICD-10-CM

## 2024-02-03 NOTE — TELEPHONE ENCOUNTER
Care Due:                  Date            Visit Type   Department     Provider  --------------------------------------------------------------------------------                                EP -                              PRIMARY      [facility]  Last Visit: 09-      CARE ([facility])   PRIMARY CARE   Grzegorz Bolton  Next Visit: None Scheduled  None         None Found                                                            Last  Test          Frequency    Reason                     Performed    Due Date  --------------------------------------------------------------------------------    CMP.........  12 months..  alendronate..............  03- 03-    [facility] Embedded Care Due Messages. Reference number: [id number].   2/03/2024 7:37:51 AM CST

## 2024-02-05 RX ORDER — THYROID, PORCINE 15 MG/1
TABLET ORAL
Qty: 270 TABLET | Refills: 2 | Status: SHIPPED | OUTPATIENT
Start: 2024-02-05 | End: 2024-06-14

## 2024-03-01 ENCOUNTER — TELEPHONE (OUTPATIENT)
Dept: PRIMARY CARE CLINIC | Facility: CLINIC | Age: 69
End: 2024-03-01
Payer: MEDICARE

## 2024-03-01 NOTE — TELEPHONE ENCOUNTER
----- Message from Adelia Mejia sent at 3/1/2024  3:52 PM CST -----  Contact: [phone] Patient  Caller is requesting an earlier appointment then we can schedule.  Caller is requesting a message be sent to the provider.  If this is for urgent care symptoms, did you offer other providers at this location, providers at other locations, or [facility]? (yes, no, n/a):    If this is for the patients physical, did you offer to schedule next available and put on wait list, or to see NP or PA for their physical?  (yes, no, n/a):    When is the next available appointment with their provider:  04/18/2024  Reason for the appointment:  Annual/ Med refill/ Fatigued  Patient preference of timeframe to be scheduled:  ASAP  Would the patient like a call back, or a response through their [facility] portal?:   Call Back Please. Thank you  Comments:

## 2024-03-07 ENCOUNTER — OFFICE VISIT (OUTPATIENT)
Dept: PRIMARY CARE CLINIC | Facility: CLINIC | Age: 69
End: 2024-03-07
Payer: MEDICARE

## 2024-03-07 VITALS
OXYGEN SATURATION: 99 % | WEIGHT: 125.56 LBS | HEART RATE: 63 BPM | TEMPERATURE: 98 F | BODY MASS INDEX: 21.43 KG/M2 | DIASTOLIC BLOOD PRESSURE: 78 MMHG | HEIGHT: 64 IN | RESPIRATION RATE: 18 BRPM | SYSTOLIC BLOOD PRESSURE: 120 MMHG

## 2024-03-07 DIAGNOSIS — Z51.81 MEDICATION MONITORING ENCOUNTER: ICD-10-CM

## 2024-03-07 DIAGNOSIS — Z13.6 ENCOUNTER FOR SCREENING FOR CARDIOVASCULAR DISORDERS: ICD-10-CM

## 2024-03-07 DIAGNOSIS — J30.2 SEASONAL ALLERGIES: ICD-10-CM

## 2024-03-07 DIAGNOSIS — R53.83 FATIGUE, UNSPECIFIED TYPE: Primary | ICD-10-CM

## 2024-03-07 DIAGNOSIS — E03.9 HYPOTHYROIDISM, UNSPECIFIED TYPE: ICD-10-CM

## 2024-03-07 DIAGNOSIS — K29.60 REFLUX GASTRITIS: ICD-10-CM

## 2024-03-07 DIAGNOSIS — B00.9 HSV (HERPES SIMPLEX VIRUS) INFECTION: ICD-10-CM

## 2024-03-07 PROCEDURE — 3008F BODY MASS INDEX DOCD: CPT | Mod: HCNC,CPTII,S$GLB, | Performed by: STUDENT IN AN ORGANIZED HEALTH CARE EDUCATION/TRAINING PROGRAM

## 2024-03-07 PROCEDURE — 3288F FALL RISK ASSESSMENT DOCD: CPT | Mod: HCNC,CPTII,S$GLB, | Performed by: STUDENT IN AN ORGANIZED HEALTH CARE EDUCATION/TRAINING PROGRAM

## 2024-03-07 PROCEDURE — 93005 ELECTROCARDIOGRAM TRACING: CPT | Mod: HCNC,S$GLB,, | Performed by: STUDENT IN AN ORGANIZED HEALTH CARE EDUCATION/TRAINING PROGRAM

## 2024-03-07 PROCEDURE — 99999 PR PBB SHADOW E&M-EST. PATIENT-LVL III: CPT | Mod: PBBFAC,HCNC,, | Performed by: STUDENT IN AN ORGANIZED HEALTH CARE EDUCATION/TRAINING PROGRAM

## 2024-03-07 PROCEDURE — 1160F RVW MEDS BY RX/DR IN RCRD: CPT | Mod: HCNC,CPTII,S$GLB, | Performed by: STUDENT IN AN ORGANIZED HEALTH CARE EDUCATION/TRAINING PROGRAM

## 2024-03-07 PROCEDURE — 1159F MED LIST DOCD IN RCRD: CPT | Mod: HCNC,CPTII,S$GLB, | Performed by: STUDENT IN AN ORGANIZED HEALTH CARE EDUCATION/TRAINING PROGRAM

## 2024-03-07 PROCEDURE — 3074F SYST BP LT 130 MM HG: CPT | Mod: HCNC,CPTII,S$GLB, | Performed by: STUDENT IN AN ORGANIZED HEALTH CARE EDUCATION/TRAINING PROGRAM

## 2024-03-07 PROCEDURE — 1100F PTFALLS ASSESS-DOCD GE2>/YR: CPT | Mod: HCNC,CPTII,S$GLB, | Performed by: STUDENT IN AN ORGANIZED HEALTH CARE EDUCATION/TRAINING PROGRAM

## 2024-03-07 PROCEDURE — 93010 ELECTROCARDIOGRAM REPORT: CPT | Mod: HCNC,S$GLB,, | Performed by: INTERNAL MEDICINE

## 2024-03-07 PROCEDURE — 99214 OFFICE O/P EST MOD 30 MIN: CPT | Mod: HCNC,S$GLB,, | Performed by: STUDENT IN AN ORGANIZED HEALTH CARE EDUCATION/TRAINING PROGRAM

## 2024-03-07 PROCEDURE — 3078F DIAST BP <80 MM HG: CPT | Mod: HCNC,CPTII,S$GLB, | Performed by: STUDENT IN AN ORGANIZED HEALTH CARE EDUCATION/TRAINING PROGRAM

## 2024-03-07 RX ORDER — FLUTICASONE PROPIONATE 50 MCG
2 SPRAY, SUSPENSION (ML) NASAL DAILY
Qty: 15.8 ML | Refills: 5 | Status: SHIPPED | OUTPATIENT
Start: 2024-03-07

## 2024-03-07 RX ORDER — PANTOPRAZOLE SODIUM 20 MG/1
TABLET, DELAYED RELEASE ORAL
Qty: 21 TABLET | Refills: 0 | Status: SHIPPED | OUTPATIENT
Start: 2024-03-07 | End: 2024-03-27

## 2024-03-07 RX ORDER — VALACYCLOVIR HYDROCHLORIDE 1 G/1
TABLET, FILM COATED ORAL
Qty: 14 TABLET | Refills: 5 | Status: SHIPPED | OUTPATIENT
Start: 2024-03-07

## 2024-03-07 NOTE — PROGRESS NOTES
"Subjective:       Patient ID: Kalina Ivan is a 69 y.o. female.    Chief Complaint: Medication Refill    HPI:  69 y.o. female presents to [facility] to discuss medications    Patient reports no concerns or desire to follow-up with Neurology for foot numbness.    Patient reports she has been feeling very fatigued at all times. Has had complications post surgery without full use of right arm. Feels physical, not emotional. Does not feel depression is a factor. Fatigue began about 1 month ago. Exhausted. Sporadic. Not worsening and can't tell a pattern.    Tried to eat more protein and drinking more electrolytes. Tries to get rest. Looked flushed in mirror recently. Feels is gaining weight.    Is currently in PT 3 times weekly, feels no longer making progress.    Fasting?: Yes    Review of Systems   Constitutional:  Positive for fatigue. Negative for chills, diaphoresis, fever and unexpected weight change.   HENT:  Positive for congestion (Has been present for weeks), rhinorrhea and sneezing. Negative for sinus pressure and sore throat.    Respiratory:  Negative for cough and shortness of breath.    Cardiovascular:  Negative for chest pain and palpitations.   Gastrointestinal:  Negative for abdominal pain, diarrhea, nausea and vomiting.   Musculoskeletal:  Positive for arthralgias (Right elbow. right ankle). Negative for joint swelling and myalgias.   Skin:  Negative for rash and wound.        Flushing 3/6/2024   Neurological:  Positive for light-headedness (Occasionally). Negative for dizziness.       Objective:      Vitals:    03/07/24 0915   BP: 120/78   BP Location: Left arm   Patient Position: Sitting   BP Method: Medium (Manual)   Pulse: 63   Resp: 18   Temp: 97.5 °F (36.4 °C)   TempSrc: Temporal   SpO2: 99%   Weight: 56.9 kg (125 lb 8.8 oz)   Height: 5' 4" (1.626 m)     Physical Exam  Vitals reviewed.   Constitutional:       General: She is not in acute distress.     Appearance: Normal appearance. She is " "not ill-appearing.   HENT:      Head: Normocephalic and atraumatic.   Eyes:      General:         Right eye: No discharge.         Left eye: No discharge.      Conjunctiva/sclera: Conjunctivae normal.   Cardiovascular:      Rate and Rhythm: Normal rate and regular rhythm.      Pulses: Normal pulses.      Heart sounds: No murmur heard.  Pulmonary:      Effort: Pulmonary effort is normal.      Breath sounds: Normal breath sounds.   Musculoskeletal:         General: No deformity.      Cervical back: Neck supple. No rigidity.   Lymphadenopathy:      Cervical: No cervical adenopathy.   Skin:     General: Skin is warm and dry.      Coloration: Skin is not jaundiced.   Neurological:      General: No focal deficit present.      Mental Status: She is alert and oriented to person, place, and time.   Psychiatric:         Mood and Affect: Mood normal.         Behavior: Behavior normal.             Lab Results   Component Value Date     03/23/2023    K 4.0 03/23/2023     03/23/2023    CO2 25 03/23/2023    BUN 13 03/23/2023    CREATININE 0.8 03/23/2023    GLUCOSE 99 02/03/2022    ANIONGAP 11 03/23/2023     Lab Results   Component Value Date    HGBA1C 5.4 08/15/2022     No results found for: "BNP", "BNPTRIAGEBLO"    Lab Results   Component Value Date    WBC 7.35 03/26/2023    HGB 12.0 03/26/2023    HCT 36.7 (L) 03/26/2023     03/26/2023    GRAN 3.8 03/26/2023    GRAN 51.3 03/26/2023     Lab Results   Component Value Date    CHOL 245 (H) 09/27/2023    HDL 75 09/27/2023    LDLCALC 151.4 09/27/2023    TRIG 93 09/27/2023          Current Outpatient Medications:     alendronate (FOSAMAX) 70 MG tablet, Take 1 tablet (70 mg total) by mouth every 7 days., Disp: 12 tablet, Rfl: 3    ARMOUR THYROID 15 mg Tab, TAKE 3 TABLETS (45 MG TOTAL) BY MOUTH BEFORE BREAKFAST., Disp: 270 tablet, Rfl: 2    calcium-vitamin D 600 mg-10 mcg (400 unit) Tab, TAKE 1 TABLET BY MOUTH TWICE A DAY, Disp: 180 tablet, Rfl: 3    gabapentin " (NEURONTIN) 300 MG capsule, Take 1 capsule (300 mg total) by mouth 3 (three) times daily. (Patient taking differently: Take 300 mg by mouth nightly.), Disp: 90 capsule, Rfl: 1    fluticasone propionate (FLONASE) 50 mcg/actuation nasal spray, 2 sprays (100 mcg total) by Each Nostril route once daily., Disp: 15.8 mL, Rfl: 5    pantoprazole (PROTONIX) 20 MG tablet, Take 1 tablet (20 mg total) by mouth once daily for 14 days, THEN 1 tablet (20 mg total) every other day for 14 days., Disp: 21 tablet, Rfl: 0    valACYclovir (VALTREX) 1000 MG tablet, TAKE ONE PILL TWICE DAILY X 7 DAYS., Disp: 14 tablet, Rfl: 5        Assessment:       1. Fatigue, unspecified type    2. Medication monitoring encounter    3. Hypothyroidism, unspecified type    4. Reflux gastritis    5. Encounter for screening for cardiovascular disorders    6. Seasonal allergies    7. HSV (herpes simplex virus) infection           Plan:       Fatigue, unspecified type  Medication monitoring encounter  Hypothyroidism, unspecified type  -     TSH; Future; Expected date: 03/07/2024  -     T4, Free; Future; Expected date: 03/07/2024  -     CBC Auto Differential; Future; Expected date: 03/07/2024  -     Comprehensive Metabolic Panel; Future; Expected date: 03/07/2024  -     Lactate dehydrogenase (LDH); Future; Expected date: 03/07/2024  -     Uric acid; Future; Expected date: 03/07/2024  -     Sedimentation rate; Future; Expected date: 03/07/2024  -     Urinalysis; Future; Expected date: 03/07/2024  -     Monospot; Future; Expected date: 03/07/2024  -     EKG 12-lead  - Will assess for organic cause  - Low risk for AMADEO  - Can formally assess for depression in future if symptoms persist    Reflux gastritis  -     pantoprazole (PROTONIX) 20 MG tablet; Take 1 tablet (20 mg total) by mouth once daily for 14 days, THEN 1 tablet (20 mg total) every other day for 14 days.  Dispense: 21 tablet; Refill: 0    Encounter for screening for cardiovascular disorders  -      Lipid Panel; Future; Expected date: 03/07/2024    Seasonal allergies  -     fluticasone propionate (FLONASE) 50 mcg/actuation nasal spray; 2 sprays (100 mcg total) by Each Nostril route once daily.  Dispense: 15.8 mL; Refill: 5    HSV (herpes simplex virus) infection  -     valACYclovir (VALTREX) 1000 MG tablet; TAKE ONE PILL TWICE DAILY X 7 DAYS.  Dispense: 14 tablet; Refill: 5    RTC in 2 months

## 2024-03-08 LAB
OHS QRS DURATION: 70 MS
OHS QTC CALCULATION: 414 MS

## 2024-03-27 DIAGNOSIS — K29.60 REFLUX GASTRITIS: ICD-10-CM

## 2024-03-27 RX ORDER — PANTOPRAZOLE SODIUM 20 MG/1
TABLET, DELAYED RELEASE ORAL
Qty: 21 TABLET | Refills: 0 | Status: SHIPPED | OUTPATIENT
Start: 2024-03-27 | End: 2024-04-18

## 2024-03-27 NOTE — TELEPHONE ENCOUNTER
Refill Routing Note   Medication(s) are not appropriate for processing by [facility] for the following reason(s):        New or recently adjusted medication    [facility] action(s):  Defer     Requires labs : Yes             Appointments  past 12m or future 3m with PCP    Date Provider   Last Visit   3/7/2024 Grzegorz Bolton MD   Next Visit   5/7/2024 Grzegorz Bolton MD   ED visits in past 90 days: 0        Note composed:4:18 PM 03/27/2024

## 2024-03-27 NOTE — TELEPHONE ENCOUNTER
Care Due:                  Date            Visit Type   Department     Provider  --------------------------------------------------------------------------------                                EP - [facility]  Last Visit: 03-      CARE ([facility])   PRIMARY CARE   Grzegorz Bolton                              EP - [facility]  Next Visit: 05-      CARE ([facility])   PRIMARY CARE   Grzegorz Bolton                                                            Last  Test          Frequency    Reason                     Performed    Due Date  --------------------------------------------------------------------------------    Mg Level....  12 months..  alendronate..............  Not Found    Overdue    Phosphate...  12 months..  alendronate..............  Not Found    Overdue    Vitamin D...  12 months..  alendronate..............  Not Found    Overdue    [employer] Embedded Care Due Messages. Reference number: [id number].   3/27/2024 12:54:08 AM CDT

## 2024-04-17 DIAGNOSIS — K29.60 REFLUX GASTRITIS: ICD-10-CM

## 2024-04-17 NOTE — TELEPHONE ENCOUNTER
No care due was identified.  [facility] Embedded Care Due Messages. Reference number: [id number].   4/17/2024 10:28:26 AM CDT

## 2024-04-18 RX ORDER — PANTOPRAZOLE SODIUM 20 MG/1
TABLET, DELAYED RELEASE ORAL
Qty: 63 TABLET | Refills: 1 | Status: SHIPPED | OUTPATIENT
Start: 2024-04-18 | End: 2024-05-07

## 2024-04-18 NOTE — TELEPHONE ENCOUNTER
Refill Routing Note   Medication(s) are not appropriate for processing by [facility] for the following reason(s):        Other  New or recently adjusted medication    [facility] action(s):  Defer      Medication Therapy Plan: requesting 90 d script      Appointments  past 12m or future 3m with PCP    Date Provider   Last Visit   3/7/2024 Grzegorz Bolton MD   Next Visit   5/7/2024 Grzegorz Bolton MD   ED visits in past 90 days: 0        Note composed:9:26 PM 04/17/2024

## 2024-04-19 ENCOUNTER — TELEPHONE (OUTPATIENT)
Dept: ORTHOPEDICS | Facility: CLINIC | Age: 69
End: 2024-04-19
Payer: MEDICARE

## 2024-04-19 DIAGNOSIS — S82.841D CLOSED BIMALLEOLAR FRACTURE OF RIGHT ANKLE WITH ROUTINE HEALING, SUBSEQUENT ENCOUNTER: Primary | ICD-10-CM

## 2024-04-19 NOTE — TELEPHONE ENCOUNTER
----- Message from Yobany Villanueva MA sent at 4/18/2024  3:13 PM CDT -----  Regarding: FW: Tom  Contact: pt [phone]    ----- Message -----  From: Monica Moody LPN  Sent: 4/18/2024   2:20 PM CDT  To: Isrrael GEORGE Staff  Subject: FW: Appt                                           ----- Message -----  From: Juno Cameron  Sent: 4/18/2024  10:44 AM CDT  To: Kelli Arcos Staff  Subject: Appt                                             Pt is calling to schedule annual follow up post ankle surgery 3/27/2023, please call pt @[phone]

## 2024-05-07 ENCOUNTER — OFFICE VISIT (OUTPATIENT)
Dept: PRIMARY CARE CLINIC | Facility: CLINIC | Age: 69
End: 2024-05-07
Payer: MEDICARE

## 2024-05-07 VITALS
HEIGHT: 64 IN | WEIGHT: 124.13 LBS | HEART RATE: 76 BPM | OXYGEN SATURATION: 100 % | SYSTOLIC BLOOD PRESSURE: 112 MMHG | TEMPERATURE: 98 F | RESPIRATION RATE: 21 BRPM | BODY MASS INDEX: 21.19 KG/M2 | DIASTOLIC BLOOD PRESSURE: 64 MMHG

## 2024-05-07 DIAGNOSIS — Z23 NEED FOR VACCINATION: ICD-10-CM

## 2024-05-07 DIAGNOSIS — G47.01 INSOMNIA DUE TO MEDICAL CONDITION: ICD-10-CM

## 2024-05-07 DIAGNOSIS — F43.0 STRESS REACTION: Primary | ICD-10-CM

## 2024-05-07 PROCEDURE — 1159F MED LIST DOCD IN RCRD: CPT | Mod: HCNC,CPTII,S$GLB, | Performed by: STUDENT IN AN ORGANIZED HEALTH CARE EDUCATION/TRAINING PROGRAM

## 2024-05-07 PROCEDURE — 1160F RVW MEDS BY RX/DR IN RCRD: CPT | Mod: HCNC,CPTII,S$GLB, | Performed by: STUDENT IN AN ORGANIZED HEALTH CARE EDUCATION/TRAINING PROGRAM

## 2024-05-07 PROCEDURE — 1101F PT FALLS ASSESS-DOCD LE1/YR: CPT | Mod: HCNC,CPTII,S$GLB, | Performed by: STUDENT IN AN ORGANIZED HEALTH CARE EDUCATION/TRAINING PROGRAM

## 2024-05-07 PROCEDURE — 3074F SYST BP LT 130 MM HG: CPT | Mod: HCNC,CPTII,S$GLB, | Performed by: STUDENT IN AN ORGANIZED HEALTH CARE EDUCATION/TRAINING PROGRAM

## 2024-05-07 PROCEDURE — 99999 PR PBB SHADOW E&M-EST. PATIENT-LVL V: CPT | Mod: PBBFAC,HCNC,, | Performed by: STUDENT IN AN ORGANIZED HEALTH CARE EDUCATION/TRAINING PROGRAM

## 2024-05-07 PROCEDURE — 3008F BODY MASS INDEX DOCD: CPT | Mod: HCNC,CPTII,S$GLB, | Performed by: STUDENT IN AN ORGANIZED HEALTH CARE EDUCATION/TRAINING PROGRAM

## 2024-05-07 PROCEDURE — 3288F FALL RISK ASSESSMENT DOCD: CPT | Mod: HCNC,CPTII,S$GLB, | Performed by: STUDENT IN AN ORGANIZED HEALTH CARE EDUCATION/TRAINING PROGRAM

## 2024-05-07 PROCEDURE — 99214 OFFICE O/P EST MOD 30 MIN: CPT | Mod: HCNC,S$GLB,, | Performed by: STUDENT IN AN ORGANIZED HEALTH CARE EDUCATION/TRAINING PROGRAM

## 2024-05-07 PROCEDURE — 1125F AMNT PAIN NOTED PAIN PRSNT: CPT | Mod: HCNC,CPTII,S$GLB, | Performed by: STUDENT IN AN ORGANIZED HEALTH CARE EDUCATION/TRAINING PROGRAM

## 2024-05-07 PROCEDURE — 3078F DIAST BP <80 MM HG: CPT | Mod: HCNC,CPTII,S$GLB, | Performed by: STUDENT IN AN ORGANIZED HEALTH CARE EDUCATION/TRAINING PROGRAM

## 2024-05-07 RX ORDER — GABAPENTIN 300 MG/1
300 CAPSULE ORAL NIGHTLY
Qty: 30 CAPSULE | Refills: 5 | Status: SHIPPED | OUTPATIENT
Start: 2024-05-07 | End: 2025-05-07

## 2024-05-07 NOTE — PROGRESS NOTES
Pt to radiology at this time.       Davon Groves RN  07/19/21 2050 Subjective:       Patient ID: Kalina Ivan is a 69 y.o. female.    Chief Complaint: Follow-up      HPI:  69 y.o. female presents to [facility] for follow-up of fatigue.    Acute concerns?: Patient reports that generalized fatigue has improved some since last seen. Feels that sleep is difficult. Part pain. 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 Now has a tear in her right shoulder which is not helping with sleep. Feels acutely worse over past couple weeks.    Received cortisone injection in shoulder without relief.    Recent stressors?: Finances, medical conditions, personal business slowed with concerns  Exercise?: No longer in PT. Does exercise at home  Meditation?: Is not good at meditation  Good social support group?: Feels light, some fading  Therapist?: Will research a therapist  Treated in past?: No  Failed trials?: Prozac  Not looking for antidepressants  Manic symptoms when explained?: None    Right elbow/arm pain following fracture in the past.    Insomnia began about 7-8 months ago.    Difficulty falling asleep?: No  Difficulty staying asleep?: Yes  Bed time routine?: Yes  Attributing concerns?: Pains and recent stressors  Caffeine, coffee, soda, tea?: No  Awakens to pee?: No  Snoring/Short of breath?: No  Number of pillows?: 1  Lights in bed?: No  Phone/TV in bed?: No  Reading in bed?: Yes, if wakes  Failed trials?: melatonin, magnesium    Little interest or pleasure in doing things: Several days  Feeling down, depressed, or hopeless: More than half the days  Trouble falling or staying asleep, or sleeping too much: More than half the days  Feeling tired or having little energy: More than half the days  Poor appetite or overeating: Not at all  Feeling bad about yourself - or that you are a failure or have let yourself or your family down: Not at all  Trouble concentrating on things, such as reading the newspaper or  watching television: More than half the days  Moving or speaking so slowly that other people could have noticed. Or the opposite - being so fidgety or restless that you have been moving around a lot more than usual: Not at all  Thoughts that you would be better off dead, or of hurting yourself in some way: Not at all  PHQ-9 Total Score: 9  If you checked off any problems, how difficult have these problems made it for you to do your work, take care of things at home, or get along with other people?: Somewhat difficult  PHQ-9 Total Score: 9        5/7/2024     1:17 PM   GAD7   1. Feeling nervous, anxious, or on edge? 2   2. Not being able to stop or control worrying? 1   3. Worrying too much about different things? 1   4. Trouble relaxing? 1   5. Being so restless that it is hard to sit still? 0   6. Becoming easily annoyed or irritable? 1   7. Feeling afraid as if something awful might happen? 1   8. If you checked off any problems, how difficult have these problems made it for you to do your work, take care of things at home, or get along with other people? 1   SWEETIE-7 Score 7         Review of Systems   Constitutional:  Positive for fatigue (Improved). Negative for appetite change, chills, diaphoresis, fever and unexpected weight change.   Respiratory:  Negative for chest tightness and shortness of breath.    Cardiovascular:  Negative for chest pain and palpitations.   Gastrointestinal:  Negative for abdominal pain, diarrhea, nausea and vomiting.   Musculoskeletal:  Positive for arthralgias (Right shouler and right elbow).   Neurological:  Negative for dizziness, light-headedness and headaches.   Psychiatric/Behavioral:  Positive for decreased concentration, dysphoric mood and sleep disturbance. Negative for suicidal ideas. The patient is nervous/anxious.        Objective:      Vitals:    05/07/24 1259   BP: 112/64   BP Location: Left arm   Patient Position: Sitting   BP Method: Medium (Manual)   Pulse: 76   Resp:  "(!) 21   Temp: 97.6 °F (36.4 °C)   TempSrc: Tympanic   SpO2: 100%   Weight: 56.3 kg (124 lb 1.9 oz)   Height: 5' 4" (1.626 m)     Physical Exam  Vitals reviewed.   Constitutional:       General: She is not in acute distress.     Appearance: Normal appearance. She is not ill-appearing.   HENT:      Head: Normocephalic and atraumatic.   Eyes:      General:         Right eye: No discharge.         Left eye: No discharge.      Conjunctiva/sclera: Conjunctivae normal.   Cardiovascular:      Rate and Rhythm: Normal rate.   Pulmonary:      Effort: Pulmonary effort is normal.   Musculoskeletal:         General: No deformity.   Skin:     Coloration: Skin is not jaundiced or pale.   Neurological:      General: No focal deficit present.      Mental Status: She is alert and oriented to person, place, and time.   Psychiatric:         Mood and Affect: Mood normal.         Behavior: Behavior normal.             Lab Results   Component Value Date     03/07/2024    K 4.6 03/07/2024     03/07/2024    CO2 24 03/07/2024    BUN 11 03/07/2024    CREATININE 0.7 03/07/2024    GLUCOSE 99 02/03/2022    ANIONGAP 12 03/07/2024     Lab Results   Component Value Date    HGBA1C 5.4 08/15/2022     No results found for: "BNP", "BNPTRIAGEBLO"    Lab Results   Component Value Date    WBC 6.67 03/07/2024    HGB 13.2 03/07/2024    HCT 40.2 03/07/2024     03/07/2024    GRAN 4.1 03/07/2024    GRAN 60.8 03/07/2024     Lab Results   Component Value Date    CHOL 268 (H) 03/07/2024    HDL 81 (H) 03/07/2024    LDLCALC 165.0 (H) 03/07/2024    TRIG 110 03/07/2024          Current Outpatient Medications:     alendronate (FOSAMAX) 70 MG tablet, Take 1 tablet (70 mg total) by mouth every 7 days., Disp: 12 tablet, Rfl: 3    ARMOUR THYROID 15 mg Tab, TAKE 3 TABLETS (45 MG TOTAL) BY MOUTH BEFORE BREAKFAST., Disp: 270 tablet, Rfl: 2    calcium-vitamin D 600 mg-10 mcg (400 unit) Tab, TAKE 1 TABLET BY MOUTH TWICE A DAY, Disp: 180 tablet, Rfl: 3    " fluticasone propionate (FLONASE) 50 mcg/actuation nasal spray, 2 sprays (100 mcg total) by Each Nostril route once daily., Disp: 15.8 mL, Rfl: 5    valACYclovir (VALTREX) 1000 MG tablet, TAKE ONE PILL TWICE DAILY X 7 DAYS., Disp: 14 tablet, Rfl: 5    gabapentin (NEURONTIN) 300 MG capsule, Take 1 capsule (300 mg total) by mouth nightly., Disp: 30 capsule, Rfl: 5    pantoprazole (PROTONIX) 20 MG tablet, TAKE 1 TABLET BY MOUTH ONCE DAILY FOR 14 DAYS, THEN 1 TABLET EVERY OTHER DAY FOR 14 DAYS. (Patient not taking: Reported on 5/7/2024), Disp: 63 tablet, Rfl: 1    RSVPreF3 antigen-AS01E, PF, (AREXVY) 120 mcg/0.5 mL SusR vaccine, Inject 0.5 mLs into the muscle once. for 1 dose, Disp: 0.5 mL, Rfl: 0        Assessment:       1. Stress reaction    2. Insomnia due to medical condition    3. Need for vaccination           Plan:       Stress reaction  Insomnia due to medical condition  -     gabapentin (NEURONTIN) 300 MG capsule; Take 1 capsule (300 mg total) by mouth nightly.  Dispense: 30 capsule; Refill: 5  -     Ambulatory referral/consult to Psychology; Future; Expected date: 05/14/2024  - Sleep tips provided today's visit. Recommend [facility]  - Conservative measures discussed today. Recommend routine exercise, daily meditation, and breathing exercises.  - F/u planned in 4 weeks. Can discuss potential for [place] Wort in future if symptoms persistent    Need for vaccination  -     RSVPreF3 antigen-AS01E, PF, (AREXVY) 120 mcg/0.5 mL SusR vaccine; Inject 0.5 mLs into the muscle once. for 1 dose  Dispense: 0.5 mL; Refill: 0    RTC in 4 weeks  Grzegorz Bolton MD

## 2024-05-22 ENCOUNTER — OFFICE VISIT (OUTPATIENT)
Dept: ORTHOPEDICS | Facility: CLINIC | Age: 69
End: 2024-05-22
Payer: MEDICARE

## 2024-05-22 VITALS
DIASTOLIC BLOOD PRESSURE: 82 MMHG | BODY MASS INDEX: 21.19 KG/M2 | SYSTOLIC BLOOD PRESSURE: 135 MMHG | HEART RATE: 70 BPM | WEIGHT: 123.44 LBS

## 2024-05-22 DIAGNOSIS — S82.841D CLOSED BIMALLEOLAR FRACTURE OF RIGHT ANKLE WITH ROUTINE HEALING, SUBSEQUENT ENCOUNTER: Primary | ICD-10-CM

## 2024-05-22 PROCEDURE — 99999 PR PBB SHADOW E&M-EST. PATIENT-LVL III: CPT | Mod: PBBFAC,HCNC,, | Performed by: ORTHOPAEDIC SURGERY

## 2024-05-22 PROCEDURE — 3079F DIAST BP 80-89 MM HG: CPT | Mod: HCNC,CPTII,S$GLB, | Performed by: ORTHOPAEDIC SURGERY

## 2024-05-22 PROCEDURE — 1126F AMNT PAIN NOTED NONE PRSNT: CPT | Mod: HCNC,CPTII,S$GLB, | Performed by: ORTHOPAEDIC SURGERY

## 2024-05-22 PROCEDURE — 99213 OFFICE O/P EST LOW 20 MIN: CPT | Mod: HCNC,S$GLB,, | Performed by: ORTHOPAEDIC SURGERY

## 2024-05-22 PROCEDURE — 3075F SYST BP GE 130 - 139MM HG: CPT | Mod: HCNC,CPTII,S$GLB, | Performed by: ORTHOPAEDIC SURGERY

## 2024-05-22 PROCEDURE — 1159F MED LIST DOCD IN RCRD: CPT | Mod: HCNC,CPTII,S$GLB, | Performed by: ORTHOPAEDIC SURGERY

## 2024-05-22 PROCEDURE — 3008F BODY MASS INDEX DOCD: CPT | Mod: HCNC,CPTII,S$GLB, | Performed by: ORTHOPAEDIC SURGERY

## 2024-05-22 NOTE — PROGRESS NOTES
Post-op Note    HPI    Kalina Ivan is here 14 months s/p the following procedure:     3/28/2023  Open reduction internal fixation right bimalleolar ankle fracture  Open reduction internal fixation right ankle syndesmosis    Doing well in regards to her right ankle.   Pain is minimal.  She occasionally has some pain over medial aspect of the ankle but this is more of a discomfort.  Walking with regular shoes today.  No significant swelling.      Physical Exam:     Patient is alert and oriented no acute distress.   Assistive Device:  None    Right ankle:  Incision(s) are well healed.  There is no evidence of dehiscence.  There is no induration erythema or signs of infection.  Appropriate soft tissue swelling.  Compartments are soft and compressible.  Warm well-perfused extremity.  Dorsiflexed to 15.   No significant soft tissue swelling.    Very mild over the medial malleolus.  No pain over the lateral malleolus.     Right ankle imaging ordered and reviewed by me showing healed bimalleolar ankle fracture with syndesmotic fixation    Assessment    Kalina Ivan is 14 months Post-op     Plan:    Treatment options discussed with her in detail.  In regards to her right ankle she is doing  well.  Very minimal swelling.  Occasional pain and achiness over the medial malleolus but I do not recommend hardware removal at this time as it is unlikely to improve her symptoms much in my opinion.  She is happy with continuing to watch this.  She will follow up as needed

## 2024-06-14 ENCOUNTER — OFFICE VISIT (OUTPATIENT)
Dept: PRIMARY CARE CLINIC | Facility: CLINIC | Age: 69
End: 2024-06-14
Payer: MEDICARE

## 2024-06-14 VITALS
RESPIRATION RATE: 18 BRPM | HEIGHT: 64 IN | SYSTOLIC BLOOD PRESSURE: 126 MMHG | OXYGEN SATURATION: 99 % | HEART RATE: 88 BPM | WEIGHT: 123.13 LBS | BODY MASS INDEX: 21.02 KG/M2 | DIASTOLIC BLOOD PRESSURE: 80 MMHG

## 2024-06-14 DIAGNOSIS — R68.84 JAW PAIN: ICD-10-CM

## 2024-06-14 DIAGNOSIS — E03.9 HYPOTHYROIDISM, UNSPECIFIED TYPE: Primary | ICD-10-CM

## 2024-06-14 DIAGNOSIS — R49.9 VOICE COMPLAINT: ICD-10-CM

## 2024-06-14 PROCEDURE — 3008F BODY MASS INDEX DOCD: CPT | Mod: HCNC,CPTII,S$GLB, | Performed by: STUDENT IN AN ORGANIZED HEALTH CARE EDUCATION/TRAINING PROGRAM

## 2024-06-14 PROCEDURE — 3074F SYST BP LT 130 MM HG: CPT | Mod: HCNC,CPTII,S$GLB, | Performed by: STUDENT IN AN ORGANIZED HEALTH CARE EDUCATION/TRAINING PROGRAM

## 2024-06-14 PROCEDURE — 1160F RVW MEDS BY RX/DR IN RCRD: CPT | Mod: HCNC,CPTII,S$GLB, | Performed by: STUDENT IN AN ORGANIZED HEALTH CARE EDUCATION/TRAINING PROGRAM

## 2024-06-14 PROCEDURE — 3079F DIAST BP 80-89 MM HG: CPT | Mod: HCNC,CPTII,S$GLB, | Performed by: STUDENT IN AN ORGANIZED HEALTH CARE EDUCATION/TRAINING PROGRAM

## 2024-06-14 PROCEDURE — 99999 PR PBB SHADOW E&M-EST. PATIENT-LVL III: CPT | Mod: PBBFAC,HCNC,, | Performed by: STUDENT IN AN ORGANIZED HEALTH CARE EDUCATION/TRAINING PROGRAM

## 2024-06-14 PROCEDURE — 99214 OFFICE O/P EST MOD 30 MIN: CPT | Mod: HCNC,S$GLB,, | Performed by: STUDENT IN AN ORGANIZED HEALTH CARE EDUCATION/TRAINING PROGRAM

## 2024-06-14 PROCEDURE — 1159F MED LIST DOCD IN RCRD: CPT | Mod: HCNC,CPTII,S$GLB, | Performed by: STUDENT IN AN ORGANIZED HEALTH CARE EDUCATION/TRAINING PROGRAM

## 2024-06-14 RX ORDER — PANTOPRAZOLE SODIUM 40 MG/1
40 TABLET, DELAYED RELEASE ORAL DAILY
Qty: 90 TABLET | Refills: 0 | Status: SHIPPED | OUTPATIENT
Start: 2024-06-14 | End: 2025-06-14

## 2024-06-14 RX ORDER — THYROID 60 MG/1
60 TABLET ORAL
Qty: 90 TABLET | Refills: 3 | Status: SHIPPED | OUTPATIENT
Start: 2024-06-14 | End: 2025-06-14

## 2024-06-14 RX ORDER — PANTOPRAZOLE SODIUM 40 MG/1
40 TABLET, DELAYED RELEASE ORAL DAILY
Qty: 90 TABLET | Refills: 3 | Status: SHIPPED | OUTPATIENT
Start: 2024-06-14 | End: 2024-06-14

## 2024-06-14 NOTE — PROGRESS NOTES
"Subjective:       Patient ID: Kalina Ivan is a 69 y.o. female.    Chief Complaint: Follow-up      HPI:  69 y.o. female presents to [facility] here for follow-up visit.    Mood overall has been better, feeling better in therapy.    Still having fatigue. Was seen 3/7/2024 in clinic and had negative organic work-up.     Patient  feels may be of benefit to attempt a slight increase in thyroid hormone. Currently on [place] thyroid 45 mg daily.    Since taking pantoprazole feels that voice has gotten lower, feels harder to sing. Not throat clearing in morning.    Patient reports some intense pain to left lower jaw. At least past 2 months. Has been having a pop when opening jaw wide.    Has been using weights with exercises to help strengthen bones.    Patient is taking [place] Wort    Review of Systems   Constitutional:  Positive for fatigue. Negative for chills, diaphoresis and fever.   HENT:  Negative for congestion, sinus pressure, sneezing and sore throat.    Respiratory:  Negative for cough and shortness of breath.    Cardiovascular:  Negative for chest pain and palpitations.   Gastrointestinal:  Negative for abdominal pain, diarrhea, nausea and vomiting.   Neurological:  Negative for dizziness and weakness.       Objective:      Vitals:    06/14/24 1440   BP: 126/80   BP Location: Left arm   Patient Position: Sitting   BP Method: Medium (Manual)   Pulse: 88   Resp: 18   SpO2: 99%   Weight: 55.8 kg (123 lb 2 oz)   Height: 5' 4" (1.626 m)     Physical Exam  Vitals reviewed.   Constitutional:       General: She is not in acute distress.     Appearance: Normal appearance. She is not ill-appearing.   HENT:      Head: Normocephalic and atraumatic.   Eyes:      General:         Right eye: No discharge.         Left eye: No discharge.      Conjunctiva/sclera: Conjunctivae normal.   Cardiovascular:      Rate and Rhythm: Normal rate.   Pulmonary:      Effort: Pulmonary effort is normal.   Musculoskeletal:         " "General: No deformity.      Comments: Opening snap left jaw with full mouth opening.   Skin:     Coloration: Skin is not jaundiced or pale.   Neurological:      General: No focal deficit present.      Mental Status: She is alert and oriented to person, place, and time.   Psychiatric:         Mood and Affect: Mood normal.         Behavior: Behavior normal.             Lab Results   Component Value Date     03/07/2024    K 4.6 03/07/2024     03/07/2024    CO2 24 03/07/2024    BUN 11 03/07/2024    CREATININE 0.7 03/07/2024    GLUCOSE 99 02/03/2022    ANIONGAP 12 03/07/2024     Lab Results   Component Value Date    HGBA1C 5.4 08/15/2022     No results found for: "BNP", "BNPTRIAGEBLO"    Lab Results   Component Value Date    WBC 6.67 03/07/2024    HGB 13.2 03/07/2024    HCT 40.2 03/07/2024     03/07/2024    GRAN 4.1 03/07/2024    GRAN 60.8 03/07/2024     Lab Results   Component Value Date    CHOL 268 (H) 03/07/2024    HDL 81 (H) 03/07/2024    LDLCALC 165.0 (H) 03/07/2024    TRIG 110 03/07/2024          Current Outpatient Medications:     calcium-vitamin D 600 mg-10 mcg (400 unit) Tab, TAKE 1 TABLET BY MOUTH TWICE A DAY, Disp: 180 tablet, Rfl: 3    fluticasone propionate (FLONASE) 50 mcg/actuation nasal spray, 2 sprays (100 mcg total) by Each Nostril route once daily., Disp: 15.8 mL, Rfl: 5    gabapentin (NEURONTIN) 300 MG capsule, Take 1 capsule (300 mg total) by mouth nightly., Disp: 30 capsule, Rfl: 5    pantoprazole (PROTONIX) 40 MG tablet, Take 1 tablet (40 mg total) by mouth once daily., Disp: 90 tablet, Rfl: 3    thyroid, pork, (ARMOUR THYROID) 60 mg Tab, Take 1 tablet (60 mg total) by mouth before breakfast., Disp: 90 tablet, Rfl: 3    valACYclovir (VALTREX) 1000 MG tablet, TAKE ONE PILL TWICE DAILY X 7 DAYS., Disp: 14 tablet, Rfl: 5        Assessment:       1. Hypothyroidism, unspecified type    2. Voice complaint    3. Jaw pain           Plan:       Hypothyroidism, unspecified type  -     " thyroid, pork, (ARMOUR THYROID) 60 mg Tab; Take 1 tablet (60 mg total) by mouth before breakfast.  Dispense: 90 tablet; Refill: 3  - Will repeat blood work in 4 weeks  - Encouraged to contact clinic if experiencing insomnia or palpitations    Voice complaint  -     pantoprazole (PROTONIX) 40 MG tablet; Take 1 tablet (40 mg total) by mouth once daily.  Dispense: 90 tablet; Refill: 3    Jaw pain  -     X-Ray Mandible Less Than 4 Views; Future; Expected date: 06/14/2024  - Alendronate stopped today  - TMJ exercises provided today's visit    RTC PRN

## 2024-07-08 ENCOUNTER — OFFICE VISIT (OUTPATIENT)
Dept: PSYCHOLOGY | Facility: CLINIC | Age: 69
End: 2024-07-08
Payer: MEDICARE

## 2024-07-08 DIAGNOSIS — F43.0 STRESS REACTION: ICD-10-CM

## 2024-07-08 PROCEDURE — 99999 PR PBB SHADOW E&M-EST. PATIENT-LVL I: CPT | Mod: PBBFAC,HCNC,,

## 2024-07-08 PROCEDURE — 90791 PSYCH DIAGNOSTIC EVALUATION: CPT | Mod: HCNC,S$GLB,,

## 2024-07-08 NOTE — PROGRESS NOTES
Psychiatry Initial Visit (PhD/LCSW)  Diagnostic Interview - CPT 59184    Date: 7/8/2024    Site: [facility]    Referral source:   Dr Bolton  Clinical status of patient: Outpatient    Kalina Ivan, a 69 y.o. female, for initial evaluation visit.  Met with patient.    Chief complaint/reason for encounter: sleep  anxiety  History of present illness:   Pt began to struggle in Nov or Dec of this past year when the momentum of physical therapy stopped.  Pain: noncontributory    Symptoms:   Mood: depressed mood, insomnia, psychomotor agitation, fatigue, and poor concentration  Anxiety: excessive anxiety/worry, restlessness/keyed up, and post-traumatic stress  Substance abuse: denied  Cognitive functioning: denied  Health behaviors:  pt wants to exercise more.     Psychiatric history: has participated in counseling/psychotherapy on an outpatient basis in the past    Medical history: Pt fell down her front stairs, and she was wheel chair bound after that. She was in a nursing home for several days. Pt lost her mobility and her ability to make an income for [employer] a year.  Pt began to  struggle with her mental health (anxiety and procrastination) after physical therapy ended in Nov. /Dec.    Family history of psychiatric illness:   Pt reported that her father is an alcoholic and her mother was depressed.    Social history (marriage, employment, etc.):   Pt had an accident 1 year ago. When she was turning 69. She fell down her stairs. Pt has not felt the same. Pt now struggles with fear and procrastination. Pt works as a , and has been a  for 12 years. Pt is from [facility]. Lived in [place] for 23 years. Pt has fear for her financial future because she is single. Pt's mother was an RN. Pt's Father was in the [employer]. Pt has 1 older sister, who lives In [place]. Pt has a BA in Literature  Substance use:   Alcohol: infrequent   Drugs: none   Tobacco: none   Caffeine: coffee,  tea    Current medications and drug reactions (include OTC, herbal): see medication list       Strengths and liabilities: Strength: Patient accepts guidance/feedback, Strength: Patient is expressive/articulate., Strength: Patient is intelligent., Strength: Patient is motivated for change., Strength: Patient is physically healthy., Strength: Patient has reasonable judgment., Strength: Patient is stable.    Current Evaluation:     Mental Status Exam:  General Appearance:  unremarkable, age appropriate   Speech: normal tone, normal rate, normal pitch, normal volume      Level of Cooperation: cooperative      Thought Processes: normal and logical   Mood: euthymic resiliant      Thought Content: normal, no suicidality, no homicidality, delusions, or paranoia   Affect: congruent and appropriate   Orientation: Oriented x3   Memory: recent >  intact, remote >  intact   Attention Span & Concentration: intact   Fund of General Knowledge: 4 of 4 recent presidents   Abstract Reasoning: interpretation of similarities was abstract, interpretation of proverbs was abstract   Judgment & Insight: good     Language  intact     Diagnostic Impression - Plan:   Pt is a 69 year old female who had an injury that set her back financially and limited her mobility for apprx a year. Since the pt for the injury ended, pt reports feeling anxiety about the future and procrastination. Pt stated treatment goals: pt would like to experience less anxiety, increase focus, feel less scattered, improve sleep hygiene, increase equanimity.     Plan:individual psychotherapy    Return to Clinic: 2 weeks    Length of Service (minutes): 60

## 2024-07-12 ENCOUNTER — PATIENT MESSAGE (OUTPATIENT)
Dept: PRIMARY CARE CLINIC | Facility: CLINIC | Age: 69
End: 2024-07-12
Payer: MEDICARE

## 2024-07-12 DIAGNOSIS — E03.9 HYPOTHYROIDISM, UNSPECIFIED TYPE: Primary | ICD-10-CM

## 2024-07-14 DIAGNOSIS — M81.0 OSTEOPOROSIS, UNSPECIFIED OSTEOPOROSIS TYPE, UNSPECIFIED PATHOLOGICAL FRACTURE PRESENCE: ICD-10-CM

## 2024-07-14 NOTE — TELEPHONE ENCOUNTER
No care due was identified.  [facility] Embedded Care Due Messages. Reference number: [id number].   7/14/2024 7:24:31 AM CDT

## 2024-07-15 RX ORDER — ALENDRONATE SODIUM 70 MG/1
70 TABLET ORAL
Qty: 12 TABLET | Refills: 3 | Status: SHIPPED | OUTPATIENT
Start: 2024-07-15

## 2024-07-29 ENCOUNTER — OFFICE VISIT (OUTPATIENT)
Dept: PSYCHOLOGY | Facility: CLINIC | Age: 69
End: 2024-07-29
Payer: MEDICARE

## 2024-07-29 DIAGNOSIS — F43.0 STRESS REACTION: Primary | ICD-10-CM

## 2024-07-29 DIAGNOSIS — F41.1 GENERALIZED ANXIETY DISORDER: ICD-10-CM

## 2024-07-29 PROCEDURE — 90785 PSYTX COMPLEX INTERACTIVE: CPT | Mod: HCNC,S$GLB,,

## 2024-07-29 PROCEDURE — 90837 PSYTX W PT 60 MINUTES: CPT | Mod: HCNC,S$GLB,,

## 2024-07-29 NOTE — PROGRESS NOTES
Individual Psychotherapy (PhD/LCSW)    7/29/2024    Site:  [facility]    Therapeutic Intervention: Met with patient.  Outpatient - Supportive psychotherapy 60 min - CPT Code 20930    Chief complaint/reason for encounter: anxiety and insomnia    Interval history and content of current session: Pt reported increased pain in her arm. Pt reported anxiety, insomnia, and procrastination. 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 Discussed self compassion, practiced deep breathing. Made a list of steps to take to get website launched. Insight timer for sleep.    Treatment plan:  Target symptoms: anxiety   Why chosen therapy is appropriate versus another modality: relevant to diagnosis  Outcome monitoring methods: self-report  Therapeutic intervention type: insight oriented psychotherapy, supportive psychotherapy, interactive psychotherapy    Risk parameters:  Patient reports no suicidal ideation  Patient reports no homicidal ideation  Patient reports no self-injurious behavior  Patient reports no violent behavior    Verbal deficits: None    Patient's response to intervention:  The patient's response to intervention is motivated.    Progress toward goals and other mental status changes:  The patient's progress toward goals is good.    Diagnosis:   No diagnosis found.    Plan:  individual psychotherapy    Return to clinic: 2 weeks    Length of Service (minutes): 60

## 2024-08-12 ENCOUNTER — OFFICE VISIT (OUTPATIENT)
Dept: PSYCHOLOGY | Facility: CLINIC | Age: 69
End: 2024-08-12
Payer: MEDICARE

## 2024-08-12 DIAGNOSIS — F41.1 GENERALIZED ANXIETY DISORDER: ICD-10-CM

## 2024-08-12 DIAGNOSIS — F43.0 STRESS REACTION: Primary | ICD-10-CM

## 2024-08-12 PROCEDURE — 90837 PSYTX W PT 60 MINUTES: CPT | Mod: HCNC,S$GLB,,

## 2024-08-12 NOTE — PROGRESS NOTES
Individual Psychotherapy (PhD/LCSW)    8/12/2024    Site:  [facility]    Therapeutic Intervention: Met with patient.  Outpatient - Supportive psychotherapy 60 min - CPT Code 41048    Chief complaint/reason for encounter: anxiety and insomnia    Interval history and content of current session: Pt reported increased pain in her arm. Pt reports feeling anxious due to financial insecurity. She lost a lot of her income due to her injury. She has begun PT again for her arm injury. PT reported that she may never be able to straighten her arm and have full use of it. Pt reported making progress on her to do check list, which we created last session. Summarized issues we worked on in therapy, and provided info. Regarding referrals. This pt would be a good fit to see Dr. Hannah.  Treatment plan:  Target symptoms: anxiety   Why chosen therapy is appropriate versus another modality: relevant to diagnosis  Outcome monitoring methods: self-report  Therapeutic intervention type: insight oriented psychotherapy, supportive psychotherapy, interactive psychotherapy    Risk parameters:  Patient reports no suicidal ideation  Patient reports no homicidal ideation  Patient reports no self-injurious behavior  Patient reports no violent behavior    Verbal deficits: None    Patient's response to intervention:  The patient's response to intervention is motivated.    Progress toward goals and other mental status changes:  The patient's progress toward goals is good.    Diagnosis:   Pt is a 69 year old female who is experiencing stress and anxiety due to multiple stressors including: financial and her health.    Plan:  individual psychotherapy    Return to clinic: 2 weeks    Length of Service (minutes): 60

## 2024-08-23 ENCOUNTER — TELEPHONE (OUTPATIENT)
Dept: BEHAVIORAL HEALTH | Facility: CLINIC | Age: 69
End: 2024-08-23
Payer: MEDICARE

## 2024-08-23 ENCOUNTER — PATIENT MESSAGE (OUTPATIENT)
Dept: BEHAVIORAL HEALTH | Facility: CLINIC | Age: 69
End: 2024-08-23
Payer: MEDICARE

## 2024-09-10 DIAGNOSIS — M81.0 AGE-RELATED OSTEOPOROSIS WITHOUT CURRENT PATHOLOGICAL FRACTURE: ICD-10-CM

## 2024-09-10 RX ORDER — MULTIVITAMIN
1 TABLET ORAL 2 TIMES DAILY
Qty: 180 TABLET | Refills: 0 | Status: SHIPPED | OUTPATIENT
Start: 2024-09-10

## 2024-09-19 ENCOUNTER — PATIENT MESSAGE (OUTPATIENT)
Dept: PRIMARY CARE CLINIC | Facility: CLINIC | Age: 69
End: 2024-09-19
Payer: MEDICARE

## 2024-10-03 ENCOUNTER — OFFICE VISIT (OUTPATIENT)
Dept: PRIMARY CARE CLINIC | Facility: CLINIC | Age: 69
End: 2024-10-03
Payer: MEDICARE

## 2024-10-03 VITALS
TEMPERATURE: 97 F | DIASTOLIC BLOOD PRESSURE: 86 MMHG | SYSTOLIC BLOOD PRESSURE: 122 MMHG | HEART RATE: 74 BPM | BODY MASS INDEX: 21.59 KG/M2 | OXYGEN SATURATION: 97 % | WEIGHT: 125.75 LBS

## 2024-10-03 DIAGNOSIS — K21.00 GASTROESOPHAGEAL REFLUX DISEASE WITH ESOPHAGITIS, UNSPECIFIED WHETHER HEMORRHAGE: ICD-10-CM

## 2024-10-03 DIAGNOSIS — R49.0 VOICE HOARSENESS: Primary | ICD-10-CM

## 2024-10-03 DIAGNOSIS — M81.0 OSTEOPOROSIS, UNSPECIFIED OSTEOPOROSIS TYPE, UNSPECIFIED PATHOLOGICAL FRACTURE PRESENCE: ICD-10-CM

## 2024-10-03 PROCEDURE — 3074F SYST BP LT 130 MM HG: CPT | Mod: HCNC,CPTII,S$GLB, | Performed by: STUDENT IN AN ORGANIZED HEALTH CARE EDUCATION/TRAINING PROGRAM

## 2024-10-03 PROCEDURE — 3008F BODY MASS INDEX DOCD: CPT | Mod: HCNC,CPTII,S$GLB, | Performed by: STUDENT IN AN ORGANIZED HEALTH CARE EDUCATION/TRAINING PROGRAM

## 2024-10-03 PROCEDURE — 1101F PT FALLS ASSESS-DOCD LE1/YR: CPT | Mod: HCNC,CPTII,S$GLB, | Performed by: STUDENT IN AN ORGANIZED HEALTH CARE EDUCATION/TRAINING PROGRAM

## 2024-10-03 PROCEDURE — 3288F FALL RISK ASSESSMENT DOCD: CPT | Mod: HCNC,CPTII,S$GLB, | Performed by: STUDENT IN AN ORGANIZED HEALTH CARE EDUCATION/TRAINING PROGRAM

## 2024-10-03 PROCEDURE — 1159F MED LIST DOCD IN RCRD: CPT | Mod: HCNC,CPTII,S$GLB, | Performed by: STUDENT IN AN ORGANIZED HEALTH CARE EDUCATION/TRAINING PROGRAM

## 2024-10-03 PROCEDURE — 99999 PR PBB SHADOW E&M-EST. PATIENT-LVL III: CPT | Mod: PBBFAC,HCNC,, | Performed by: STUDENT IN AN ORGANIZED HEALTH CARE EDUCATION/TRAINING PROGRAM

## 2024-10-03 PROCEDURE — 3079F DIAST BP 80-89 MM HG: CPT | Mod: HCNC,CPTII,S$GLB, | Performed by: STUDENT IN AN ORGANIZED HEALTH CARE EDUCATION/TRAINING PROGRAM

## 2024-10-03 PROCEDURE — 99214 OFFICE O/P EST MOD 30 MIN: CPT | Mod: HCNC,S$GLB,, | Performed by: STUDENT IN AN ORGANIZED HEALTH CARE EDUCATION/TRAINING PROGRAM

## 2024-10-03 PROCEDURE — 1160F RVW MEDS BY RX/DR IN RCRD: CPT | Mod: HCNC,CPTII,S$GLB, | Performed by: STUDENT IN AN ORGANIZED HEALTH CARE EDUCATION/TRAINING PROGRAM

## 2024-10-03 RX ORDER — ALENDRONATE SODIUM 70 MG/1
70 TABLET ORAL
Qty: 12 TABLET | Refills: 3 | Status: SHIPPED | OUTPATIENT
Start: 2024-10-03

## 2024-10-03 RX ORDER — PANTOPRAZOLE SODIUM 40 MG/1
40 TABLET, DELAYED RELEASE ORAL DAILY
Qty: 90 TABLET | Refills: 3 | Status: SHIPPED | OUTPATIENT
Start: 2024-10-03 | End: 2025-10-03

## 2024-10-03 NOTE — PROGRESS NOTES
Subjective:       Patient ID: Kalina Ivan is a 69 y.o. female.    Chief Complaint: Follow-up (3 month f/u)    HPI:  69 y.o. female presents to [facility] for 3 month follow-up visit    Acute concerns?: Patient reports that her injuries from her fall have never completely resolved. Right arm/elbow with ongoing concerns. Would like to know who to direct concerns for ongoing long term disabilities.    Voice since last seen?: Feels had improved on 40 mg pantoprazole, now down to 20 mg and hoarseness has worsened.     Jaw pain?: Has improved with exercises, would like to resume alendronate.    Had some limited therapy sessions here locally, but provider left and waiting to find a new provider in the past.    Review of Systems   Constitutional:  Negative for appetite change, chills, diaphoresis, fatigue, fever and unexpected weight change.   Respiratory:  Negative for chest tightness and shortness of breath.    Cardiovascular:  Negative for chest pain and palpitations.   Gastrointestinal:  Negative for abdominal pain, diarrhea, nausea and vomiting.   Musculoskeletal:  Positive for arthralgias (Right shouler and right elbow). Negative for joint swelling and myalgias.   Skin:  Negative for rash and wound.   Neurological:  Negative for dizziness, weakness, light-headedness and headaches.   Psychiatric/Behavioral:  Positive for decreased concentration, dysphoric mood and sleep disturbance. Negative for suicidal ideas. The patient is nervous/anxious.        Objective:      Vitals:    10/03/24 1435   BP: 122/86   BP Location: Right arm   Patient Position: Sitting   Pulse: 74   Temp: 97.1 °F (36.2 °C)   SpO2: 97%   Weight: 57.1 kg (125 lb 12.4 oz)     Physical Exam  Vitals reviewed.   Constitutional:       General: She is not in acute distress.     Appearance: Normal appearance. She is not ill-appearing.   HENT:      Head: Normocephalic and atraumatic.   Eyes:      General:         Right eye: No discharge.         Left  "eye: No discharge.      Conjunctiva/sclera: Conjunctivae normal.   Cardiovascular:      Rate and Rhythm: Normal rate.   Pulmonary:      Effort: Pulmonary effort is normal.   Musculoskeletal:         General: No deformity.   Skin:     Coloration: Skin is not jaundiced or pale.   Neurological:      General: No focal deficit present.      Mental Status: She is alert and oriented to person, place, and time.   Psychiatric:         Mood and Affect: Mood normal.         Behavior: Behavior normal.             Lab Results   Component Value Date     03/07/2024    K 4.6 03/07/2024     03/07/2024    CO2 24 03/07/2024    BUN 11 03/07/2024    CREATININE 0.7 03/07/2024    GLUCOSE 99 02/03/2022    ANIONGAP 12 03/07/2024     Lab Results   Component Value Date    HGBA1C 5.4 08/15/2022     No results found for: "BNP", "BNPTRIAGEBLO"    Lab Results   Component Value Date    WBC 6.67 03/07/2024    HGB 13.2 03/07/2024    HCT 40.2 03/07/2024     03/07/2024    GRAN 4.1 03/07/2024    GRAN 60.8 03/07/2024     Lab Results   Component Value Date    CHOL 268 (H) 03/07/2024    HDL 81 (H) 03/07/2024    LDLCALC 165.0 (H) 03/07/2024    TRIG 110 03/07/2024          Current Outpatient Medications:     calcium-vitamin D 600 mg-10 mcg (400 unit) Tab, TAKE 1 TABLET BY MOUTH TWICE A DAY, Disp: 180 tablet, Rfl: 0    thyroid, pork, (ARMOUR THYROID) 60 mg Tab, Take 1 tablet (60 mg total) by mouth before breakfast., Disp: 90 tablet, Rfl: 3    alendronate (FOSAMAX) 70 MG tablet, Take 1 tablet (70 mg total) by mouth every 7 days., Disp: 12 tablet, Rfl: 3    gabapentin (NEURONTIN) 300 MG capsule, Take 1 capsule (300 mg total) by mouth nightly. (Patient not taking: Reported on 10/3/2024), Disp: 30 capsule, Rfl: 5    pantoprazole (PROTONIX) 40 MG tablet, Take 1 tablet (40 mg total) by mouth once daily., Disp: 90 tablet, Rfl: 3    valACYclovir (VALTREX) 1000 MG tablet, TAKE ONE PILL TWICE DAILY X 7 DAYS. (Patient not taking: Reported on " 10/3/2024), Disp: 14 tablet, Rfl: 5        Assessment:       1. Voice hoarseness    2. Osteoporosis, unspecified osteoporosis type, unspecified pathological fracture presence    3. Gastroesophageal reflux disease with esophagitis, unspecified whether hemorrhage           Plan:       Voice hoarseness  Gastroesophageal reflux disease with esophagitis, unspecified whether hemorrhage  -     Ambulatory referral/consult to Gastroenterology; Future; Expected date: 10/10/2024  -     pantoprazole (PROTONIX) 40 MG tablet; Take 1 tablet (40 mg total) by mouth once daily.  Dispense: 90 tablet; Refill: 3  - Will continue PPI at this time and with inability to taper PPI will refer to Gastroenterology    Osteoporosis, unspecified osteoporosis type, unspecified pathological fracture presence  -     alendronate (FOSAMAX) 70 MG tablet; Take 1 tablet (70 mg total) by mouth every 7 days.  Dispense: 12 tablet; Refill: 3      Recommend patient speak with her  for recommendations regarding providers for complications evaluation following fall 3/2023  RTC in 6 months

## 2024-10-10 DIAGNOSIS — M81.0 AGE-RELATED OSTEOPOROSIS WITHOUT CURRENT PATHOLOGICAL FRACTURE: ICD-10-CM

## 2024-10-11 RX ORDER — MULTIVITAMIN
1 TABLET ORAL 2 TIMES DAILY
Qty: 180 TABLET | Refills: 0 | Status: SHIPPED | OUTPATIENT
Start: 2024-10-11

## 2024-10-14 ENCOUNTER — HOSPITAL ENCOUNTER (OUTPATIENT)
Dept: RADIOLOGY | Facility: HOSPITAL | Age: 69
Discharge: HOME | End: 2024-10-14
Attending: STUDENT IN AN ORGANIZED HEALTH CARE EDUCATION/TRAINING PROGRAM
Payer: MEDICARE

## 2024-10-14 DIAGNOSIS — Z12.31 ENCOUNTER FOR SCREENING MAMMOGRAM FOR BREAST CANCER: ICD-10-CM

## 2024-10-14 PROCEDURE — 77067 SCR MAMMO BI INCL CAD: CPT | Mod: TC,HCNC

## 2024-10-14 PROCEDURE — 77063 BREAST TOMOSYNTHESIS BI: CPT | Mod: 26,HCNC,, | Performed by: RADIOLOGY

## 2024-10-14 PROCEDURE — 77063 BREAST TOMOSYNTHESIS BI: CPT | Mod: TC,HCNC

## 2024-10-14 PROCEDURE — 77067 SCR MAMMO BI INCL CAD: CPT | Mod: 26,HCNC,, | Performed by: RADIOLOGY

## 2024-10-24 DIAGNOSIS — Z78.0 MENOPAUSE: ICD-10-CM

## 2024-12-11 ENCOUNTER — OFFICE VISIT (OUTPATIENT)
Dept: ORTHOPEDICS | Facility: CLINIC | Age: 69
End: 2024-12-11
Payer: MEDICARE

## 2024-12-11 ENCOUNTER — OFFICE VISIT (OUTPATIENT)
Dept: GASTROENTEROLOGY | Facility: CLINIC | Age: 69
End: 2024-12-11
Payer: MEDICARE

## 2024-12-11 VITALS
SYSTOLIC BLOOD PRESSURE: 143 MMHG | OXYGEN SATURATION: 99 % | BODY MASS INDEX: 21.22 KG/M2 | WEIGHT: 124.31 LBS | HEART RATE: 76 BPM | DIASTOLIC BLOOD PRESSURE: 78 MMHG | HEIGHT: 64 IN

## 2024-12-11 VITALS
HEART RATE: 76 BPM | BODY MASS INDEX: 20.66 KG/M2 | SYSTOLIC BLOOD PRESSURE: 156 MMHG | WEIGHT: 120.38 LBS | DIASTOLIC BLOOD PRESSURE: 72 MMHG

## 2024-12-11 DIAGNOSIS — K21.00 GASTROESOPHAGEAL REFLUX DISEASE WITH ESOPHAGITIS, UNSPECIFIED WHETHER HEMORRHAGE: ICD-10-CM

## 2024-12-11 DIAGNOSIS — R49.0 VOICE HOARSENESS: ICD-10-CM

## 2024-12-11 DIAGNOSIS — S82.841D CLOSED BIMALLEOLAR FRACTURE OF RIGHT ANKLE WITH ROUTINE HEALING, SUBSEQUENT ENCOUNTER: Primary | ICD-10-CM

## 2024-12-11 PROCEDURE — 1101F PT FALLS ASSESS-DOCD LE1/YR: CPT | Mod: HCNC,CPTII,S$GLB, | Performed by: INTERNAL MEDICINE

## 2024-12-11 PROCEDURE — 1159F MED LIST DOCD IN RCRD: CPT | Mod: HCNC,CPTII,S$GLB, | Performed by: ORTHOPAEDIC SURGERY

## 2024-12-11 PROCEDURE — 3008F BODY MASS INDEX DOCD: CPT | Mod: HCNC,CPTII,S$GLB, | Performed by: ORTHOPAEDIC SURGERY

## 2024-12-11 PROCEDURE — 99204 OFFICE O/P NEW MOD 45 MIN: CPT | Mod: HCNC,S$GLB,, | Performed by: INTERNAL MEDICINE

## 2024-12-11 PROCEDURE — 99999 PR PBB SHADOW E&M-EST. PATIENT-LVL III: CPT | Mod: PBBFAC,HCNC,, | Performed by: ORTHOPAEDIC SURGERY

## 2024-12-11 PROCEDURE — 1159F MED LIST DOCD IN RCRD: CPT | Mod: HCNC,CPTII,S$GLB, | Performed by: INTERNAL MEDICINE

## 2024-12-11 PROCEDURE — 99213 OFFICE O/P EST LOW 20 MIN: CPT | Mod: HCNC,S$GLB,, | Performed by: ORTHOPAEDIC SURGERY

## 2024-12-11 PROCEDURE — 3077F SYST BP >= 140 MM HG: CPT | Mod: HCNC,CPTII,S$GLB, | Performed by: ORTHOPAEDIC SURGERY

## 2024-12-11 PROCEDURE — 3077F SYST BP >= 140 MM HG: CPT | Mod: HCNC,CPTII,S$GLB, | Performed by: INTERNAL MEDICINE

## 2024-12-11 PROCEDURE — 99999 PR PBB SHADOW E&M-EST. PATIENT-LVL III: CPT | Mod: PBBFAC,HCNC,, | Performed by: INTERNAL MEDICINE

## 2024-12-11 PROCEDURE — 1125F AMNT PAIN NOTED PAIN PRSNT: CPT | Mod: HCNC,CPTII,S$GLB, | Performed by: INTERNAL MEDICINE

## 2024-12-11 PROCEDURE — 3288F FALL RISK ASSESSMENT DOCD: CPT | Mod: HCNC,CPTII,S$GLB, | Performed by: INTERNAL MEDICINE

## 2024-12-11 PROCEDURE — 3078F DIAST BP <80 MM HG: CPT | Mod: HCNC,CPTII,S$GLB, | Performed by: ORTHOPAEDIC SURGERY

## 2024-12-11 PROCEDURE — 3008F BODY MASS INDEX DOCD: CPT | Mod: HCNC,CPTII,S$GLB, | Performed by: INTERNAL MEDICINE

## 2024-12-11 PROCEDURE — 3078F DIAST BP <80 MM HG: CPT | Mod: HCNC,CPTII,S$GLB, | Performed by: INTERNAL MEDICINE

## 2024-12-11 PROCEDURE — 1126F AMNT PAIN NOTED NONE PRSNT: CPT | Mod: HCNC,CPTII,S$GLB, | Performed by: ORTHOPAEDIC SURGERY

## 2024-12-11 RX ORDER — LANSOPRAZOLE 30 MG/1
30 CAPSULE, DELAYED RELEASE ORAL DAILY
Qty: 30 CAPSULE | Refills: 3 | Status: SHIPPED | OUTPATIENT
Start: 2024-12-11

## 2024-12-11 NOTE — PROGRESS NOTES
"Subjective:       Patient ID: Kalina Ivan is a 69 y.o. female.    Chief Complaint: Gastroesophageal Reflux    Patient here today to establish care with aforementioned complaints .    History of hoarseness which has been going on for the past several years.  Has seen ENT in the past and diagnosed with "silent reflux".  She was placed on Protonix 40 and, at some point, this was decreased to Protonix 20 mg q.day. Following decrease she did note that symptoms worsened however she has not believe they have gotten much better since going back to higher dose.  She denies significant heartburn or dysphagia.    History of osteoporosis    Screening colonoscopy performed in 2021 at [facility].  Results not available however believes she was to repeat it in 10 years.      Past Medical History:   Diagnosis Date    Hypothyroidism        Past Surgical History:   Procedure Laterality Date    FRACTURE SURGERY      OPEN REDUCTION AND INTERNAL FIXATION (ORIF) OF INJURY OF ANKLE Right 03/27/2023    Procedure: ORIF, ANKLE;  Surgeon: Isrrael Pereira MD;  Location: [facility];  Service: Orthopedics;  Laterality: Right;    ORIF, ANKLE - Right Right 03/27/2023       Social History  Social History     Tobacco Use    Smoking status: Never    Smokeless tobacco: Never   Substance Use Topics    Alcohol use: Not Currently     Alcohol/week: 1.0 standard drink of alcohol     Types: 1 Glasses of wine per week     Comment: Occasionaly    Drug use: Never       Family History   Problem Relation Name Age of Onset    Heart disease Mother Sierra     Diabetes Mother Sierra     Cancer Father Vega         Luoliva     Alcohol abuse Father Vega     Early death Father Vega        Review of Systems   HENT:  Positive for voice change.    Cardiovascular:  Negative for chest pain.           Objective:      Physical Exam  Constitutional:       Appearance: She is well-developed.   HENT:      Head: Normocephalic and atraumatic.   Eyes:      Conjunctiva/sclera: " Conjunctivae normal.   Pulmonary:      Effort: Pulmonary effort is normal. No respiratory distress.   Musculoskeletal:      Cervical back: Normal range of motion.   Neurological:      Mental Status: She is alert and oriented to person, place, and time.   Psychiatric:         Behavior: Behavior normal.         Thought Content: Thought content normal.         Judgment: Judgment normal.           Pertinent labs and imaging studies reviewed    Assessment:       1. Voice hoarseness    2. Gastroesophageal reflux disease with esophagitis, unspecified whether hemorrhage        Plan:       With worsening of symptoms while on lower dose.  Discussed doubling Protonix however given history of osteoporosis patient has some trepidation which is not unreasonable.  Will attempt class switch   Schedule EGD given history of chronic GERD to screen for Jang's  By patient's account she is up-to-date with colon cancer screening    (Portions of this note were dictated using voice recognition software and may contain dictation related errors in spelling/grammar/syntax not found on text review)

## 2024-12-11 NOTE — PROGRESS NOTES
Patient ID: Kalina Ivan is a 69 y.o. female    Chief Complaint:   Chief Complaint   Patient presents with    Right Ankle - Pain       History of Present Illness:    Pleasant 69-year-old female previously treated by me for right ankle fracture status post ORIF in March of 2023.  She went on to heal.  She has some residual pain mostly in the medial aspect of the ankle.  She is here mostly for her right leg and calf region.  She states her leg feels spongy in the anterior aspect of the tibia and has noticed a skin change in regards to discoloration in the front of her tibia.  She also states it feels itchy.  Also seeing upper extremity specialist for her right elbow.  Was treated with a injection recently which she did state helped.  Not planning on having surgery for this.    PAST MEDICAL HISTORY:   Past Medical History:   Diagnosis Date    Hypothyroidism      PAST SURGICAL HISTORY:   Past Surgical History:   Procedure Laterality Date    FRACTURE SURGERY      OPEN REDUCTION AND INTERNAL FIXATION (ORIF) OF INJURY OF ANKLE Right 03/27/2023    Procedure: ORIF, ANKLE;  Surgeon: Isrrael Pereira MD;  Location: [facility];  Service: Orthopedics;  Laterality: Right;    ORIF, ANKLE - Right Right 03/27/2023     FAMILY HISTORY:   Family History   Problem Relation Name Age of Onset    Heart disease Mother Sierra     Diabetes Mother Sierra     Cancer Father Vega         Lumariemia     Alcohol abuse Father Vega     Early death Father Vega      SOCIAL HISTORY:   Social History     Occupational History    Not on file   Tobacco Use    Smoking status: Never    Smokeless tobacco: Never   Substance and Sexual Activity    Alcohol use: Not Currently     Alcohol/week: 1.0 standard drink of alcohol     Types: 1 Glasses of wine per week     Comment: Occasionaly    Drug use: Never    Sexual activity: Not Currently     Partners: Female     Birth control/protection: Post-menopausal, None        MEDICATIONS:   Current Outpatient Medications:      alendronate (FOSAMAX) 70 MG tablet, Take 1 tablet (70 mg total) by mouth every 7 days., Disp: 12 tablet, Rfl: 3    calcium-vitamin D 600 mg-10 mcg (400 unit) Tab, TAKE 1 TABLET BY MOUTH TWICE A DAY, Disp: 180 tablet, Rfl: 0    gabapentin (NEURONTIN) 300 MG capsule, Take 1 capsule (300 mg total) by mouth nightly. (Patient not taking: Reported on 10/3/2024), Disp: 30 capsule, Rfl: 5    pantoprazole (PROTONIX) 40 MG tablet, Take 1 tablet (40 mg total) by mouth once daily., Disp: 90 tablet, Rfl: 3    thyroid, pork, (ARMOUR THYROID) 60 mg Tab, Take 1 tablet (60 mg total) by mouth before breakfast., Disp: 90 tablet, Rfl: 3    valACYclovir (VALTREX) 1000 MG tablet, TAKE ONE PILL TWICE DAILY X 7 DAYS. (Patient not taking: Reported on 10/3/2024), Disp: 14 tablet, Rfl: 5  ALLERGIES:   Review of patient's allergies indicates:   Allergen Reactions    Hazelnut Shortness Of Breath         Physical Exam     Vitals:    12/11/24 1116   BP: (!) 156/72   Pulse: 76     Alert and oriented to person, place and time. No acute distress. Well-groomed, not ill appearing. Pupils round and reactive, normal respiratory effort, no audible wheezing.     On exam she has well-healed right ankle incisions medially and laterally.  She does have some tenderness over the medial malleolus.  There was no obvious instability.  She has excellent range of motion with dorsiflexion and plantar flexion.  No tenderness over the distal fibular plate.  No significant pain over the posterior tibial tendon.  There is some discoloration of the skin of the anterior leg not at the site of the surgical scars.  No pitting edema.        Imaging:     None      Assessment & Plan    Closed bimalleolar fracture of right ankle with routine healing, subsequent encounter         Treatment options were discussed with her in detail.  She is overall getting around well.  We again discussed removal of hardware medially where she is having the pain although I do not anticipate this  would make her pain much better.  Again discussed that she may always have some discomfort in the ankle since her ankle fracture.  Her x-rays have always shown a healed bimalleolar ankle fracture.  In regards to her skin changes and feelings of itchiness we discussed hydrocortisone cream.  Not related to her surgery or ankle fracture.

## 2024-12-30 ENCOUNTER — TELEPHONE (OUTPATIENT)
Dept: GASTROENTEROLOGY | Facility: CLINIC | Age: 69
End: 2024-12-30
Payer: MEDICARE

## 2024-12-30 NOTE — TELEPHONE ENCOUNTER
"----- Message from Med Assistant Valdes sent at 12/30/2024  3:57 PM CST -----  Regarding: FW: call back    ----- Message -----  From: Dimitrios Peterson  Sent: 12/30/2024  12:46 PM CST  To: Kiana Garcia Staff  Subject: call back                                        Consult/Advisory:        Name Of Caller: Self     Contact Preference?:[phone]     What is the nature of the call?: Calling to speak w/  someone in regards to some question she has about her appt on 01/09 requesting call back       Additional Notes:  "Thank you for all that you do for our patients"  "

## 2024-12-30 NOTE — TELEPHONE ENCOUNTER
gAVE THE PATIENT THE CENTRALIZED PRICING OFFICE NUMBER FOR HER TO CHECK TO SEE IF THE egd IS COVERED.

## 2025-01-06 DIAGNOSIS — M81.0 AGE-RELATED OSTEOPOROSIS WITHOUT CURRENT PATHOLOGICAL FRACTURE: ICD-10-CM

## 2025-01-06 RX ORDER — MULTIVITAMIN
1 TABLET ORAL 2 TIMES DAILY
Qty: 180 TABLET | Refills: 0 | Status: SHIPPED | OUTPATIENT
Start: 2025-01-06

## 2025-01-14 DIAGNOSIS — Z00.00 ENCOUNTER FOR MEDICARE ANNUAL WELLNESS EXAM: ICD-10-CM

## 2025-01-27 ENCOUNTER — TELEPHONE (OUTPATIENT)
Dept: ORTHOPEDICS | Facility: CLINIC | Age: 70
End: 2025-01-27
Payer: MEDICARE

## 2025-01-27 NOTE — TELEPHONE ENCOUNTER
----- Message from Jose Bruno MD sent at 1/16/2025 10:29 AM CST -----  Pathologic findings for recent EGD reviewed.  No evidence of H pylori infection identified.  Some mild inflammation was noted.  Continue current medications.  Follow up as needed

## 2025-02-12 ENCOUNTER — LAB VISIT (OUTPATIENT)
Dept: LAB | Facility: OTHER | Age: 70
End: 2025-02-12
Attending: NURSE PRACTITIONER
Payer: MEDICARE

## 2025-02-12 ENCOUNTER — OFFICE VISIT (OUTPATIENT)
Dept: INTERNAL MEDICINE | Facility: CLINIC | Age: 70
End: 2025-02-12
Payer: MEDICARE

## 2025-02-12 ENCOUNTER — TELEPHONE (OUTPATIENT)
Dept: INTERNAL MEDICINE | Facility: CLINIC | Age: 70
End: 2025-02-12
Payer: MEDICARE

## 2025-02-12 VITALS
HEIGHT: 64 IN | DIASTOLIC BLOOD PRESSURE: 66 MMHG | BODY MASS INDEX: 21.27 KG/M2 | OXYGEN SATURATION: 98 % | SYSTOLIC BLOOD PRESSURE: 128 MMHG | HEART RATE: 74 BPM | WEIGHT: 124.56 LBS

## 2025-02-12 DIAGNOSIS — S82.891A MALLEOLAR FRACTURE, RIGHT, CLOSED, INITIAL ENCOUNTER: ICD-10-CM

## 2025-02-12 DIAGNOSIS — E03.9 HYPOTHYROIDISM, UNSPECIFIED TYPE: Primary | ICD-10-CM

## 2025-02-12 DIAGNOSIS — M81.0 OSTEOPOROSIS, UNSPECIFIED OSTEOPOROSIS TYPE, UNSPECIFIED PATHOLOGICAL FRACTURE PRESENCE: ICD-10-CM

## 2025-02-12 DIAGNOSIS — Z00.00 ENCOUNTER FOR PREVENTIVE HEALTH EXAMINATION: ICD-10-CM

## 2025-02-12 DIAGNOSIS — Z00.00 ENCOUNTER FOR MEDICARE ANNUAL WELLNESS EXAM: ICD-10-CM

## 2025-02-12 DIAGNOSIS — S52.121P CLOSED DISPLACED FRACTURE OF HEAD OF RIGHT RADIUS WITH MALUNION, SUBSEQUENT ENCOUNTER: ICD-10-CM

## 2025-02-12 LAB — HCV AB SERPL QL IA: NEGATIVE

## 2025-02-12 PROCEDURE — 99999 PR PBB SHADOW E&M-EST. PATIENT-LVL III: CPT | Mod: PBBFAC,HCNC,, | Performed by: NURSE PRACTITIONER

## 2025-02-12 PROCEDURE — 86803 HEPATITIS C AB TEST: CPT | Mod: HCNC | Performed by: NURSE PRACTITIONER

## 2025-02-12 PROCEDURE — 36415 COLL VENOUS BLD VENIPUNCTURE: CPT | Mod: HCNC | Performed by: NURSE PRACTITIONER

## 2025-02-12 NOTE — PATIENT INSTRUCTIONS
Counseling and Referral of Other Preventative  (Italic type indicates deductible and co-insurance are waived)    Patient Name: Kalina Ivan  Today's Date: 2/12/2025    Health Maintenance       Date Due Completion Date    Hepatitis C Screening Never done ---    Lipid Panel 03/07/2025 3/7/2024    Mammogram 10/14/2025 10/14/2024    DEXA Scan 11/01/2027 11/1/2024    RSV Vaccine (Age 60+ and Pregnant patients) (1 - 1-dose 75+ series) 01/06/2029 ---    TETANUS VACCINE 06/03/2031 6/3/2021    Colorectal Cancer Screening 06/30/2031 ---        No orders of the defined types were placed in this encounter.    The following information is provided to all patients.  This information is to help you find resources for any of the problems found today that may be affecting your health:                  Living healthy guide: www.[facility].[place].gov      Understanding Diabetes: www.diabetes.org      Eating healthy: www.cdc.gov/healthyweight      [facility] home safety checklist: www.cdc.gov/steadi/patient.html      Agency on Aging: www.goea.[place].[facility]      Alcoholics anonymous (AA): www.aa.org      Physical Activity: www.alva.nih.gov/ji8pslt      Tobacco use: www.quitwithusla.org

## 2025-02-12 NOTE — PROGRESS NOTES
"  Kalina Ivan presented for an initial Medicare AWV today. The following components were reviewed and updated:    Medical history  Family History  Social history  Allergies and Current Medications  Health Risk Assessment  Health Maintenance  Care Team    **See Completed Assessments for Annual Wellness visit with in the encounter summary    The following assessments were completed:  Depression Screening  Cognitive function Screening  Timed Get Up Test  Whisper Test      Opioid documentation:      Patient does not have a current opioid prescription.            Vitals:    02/12/25 1104   BP: 128/66   BP Location: Left arm   Patient Position: Sitting   Pulse: 74   SpO2: 98%   Weight: 56.5 kg (124 lb 9 oz)   Height: 5' 4" (1.626 m)     Body mass index is 21.38 kg/m².       Physical Exam  Constitutional:       Appearance: Normal appearance.   Cardiovascular:      Rate and Rhythm: Normal rate and regular rhythm.   Pulmonary:      Effort: Pulmonary effort is normal.      Breath sounds: Normal breath sounds.   Neurological:      Mental Status: She is alert and oriented to person, place, and time.   Psychiatric:         Mood and Affect: Mood normal.           Diagnoses and health risks identified today and associated recommendations/orders:  1. Encounter for Medicare annual wellness exam  Annual Health Risk Assessment (HRA) visit today.  Counseling and referral of health maintenance and preventative health measures performed.  Patient given annual wellness paperwork to take home.  Encouraged to return in 1 year for subsequent HRA visit.   - Ambulatory Referral/Consult to Enhanced Annual Wellness Visit (eAWV)    2. Hypothyroidism, unspecified type  Chronic. Stable. Patient is on [place] thyroid. Continue current treatment plan as previously prescribed by PCP.    3. Osteoporosis, unspecified osteoporosis type, unspecified pathological fracture presence  Chronic. Stable. Patient is on Fosamax. Continue current treatment plan as " previously prescribed by PCP.    4. Malleolar fracture, right, closed, initial encounter  Chronic. Stable. Continue current treatment plan as previously prescribed by PCP.    5. Closed displaced fracture of head of right radius with malunion, subsequent encounter  Chronic. Stable. Continue current treatment plan as previously prescribed by PCP.    6. Encounter for preventive health examination  Chronic. Stable. Continue current treatment plan as previously prescribed by PCP.        Provided Kalina with a 5-10 year written screening schedule and personal prevention plan. Recommendations were developed using the USPSTF age appropriate recommendations. Education, counseling, and referrals were provided as needed.  After Visit Summary printed and given to patient which includes a list of additional screenings\tests needed.    No follow-ups on file.      Mark Barron NP

## 2025-02-19 ENCOUNTER — TELEPHONE (OUTPATIENT)
Dept: PSYCHOLOGY | Facility: CLINIC | Age: 70
End: 2025-02-19
Payer: MEDICARE

## 2025-03-06 ENCOUNTER — OFFICE VISIT (OUTPATIENT)
Dept: PRIMARY CARE CLINIC | Facility: CLINIC | Age: 70
End: 2025-03-06
Payer: MEDICARE

## 2025-03-06 VITALS
HEART RATE: 71 BPM | BODY MASS INDEX: 21.38 KG/M2 | DIASTOLIC BLOOD PRESSURE: 80 MMHG | HEIGHT: 64 IN | RESPIRATION RATE: 18 BRPM | OXYGEN SATURATION: 96 % | SYSTOLIC BLOOD PRESSURE: 120 MMHG

## 2025-03-06 DIAGNOSIS — Z13.6 ENCOUNTER FOR SCREENING FOR CARDIOVASCULAR DISORDERS: ICD-10-CM

## 2025-03-06 DIAGNOSIS — R53.83 FATIGUE, UNSPECIFIED TYPE: Primary | ICD-10-CM

## 2025-03-06 DIAGNOSIS — E03.9 HYPOTHYROIDISM, UNSPECIFIED TYPE: ICD-10-CM

## 2025-03-06 DIAGNOSIS — R53.83 FATIGUE, UNSPECIFIED TYPE: ICD-10-CM

## 2025-03-06 DIAGNOSIS — F32.0 CURRENT MILD EPISODE OF MAJOR DEPRESSIVE DISORDER WITHOUT PRIOR EPISODE: ICD-10-CM

## 2025-03-06 PROCEDURE — 99999 PR PBB SHADOW E&M-EST. PATIENT-LVL III: CPT | Mod: PBBFAC,HCNC,, | Performed by: STUDENT IN AN ORGANIZED HEALTH CARE EDUCATION/TRAINING PROGRAM

## 2025-03-06 RX ORDER — SERTRALINE HYDROCHLORIDE 100 MG/1
100 TABLET, FILM COATED ORAL DAILY
Qty: 30 TABLET | Refills: 11 | Status: SHIPPED | OUTPATIENT
Start: 2025-03-06 | End: 2025-03-06

## 2025-03-06 RX ORDER — PANTOPRAZOLE SODIUM 40 MG/1
40 TABLET, DELAYED RELEASE ORAL DAILY
Qty: 90 TABLET | Refills: 3 | Status: SHIPPED | OUTPATIENT
Start: 2025-03-06 | End: 2026-03-06

## 2025-03-06 RX ORDER — ST. JOHN'S WORT 300 MG
CAPSULE ORAL
COMMUNITY
End: 2025-03-06

## 2025-03-06 RX ORDER — PANTOPRAZOLE SODIUM 40 MG/1
40 TABLET, DELAYED RELEASE ORAL DAILY
Qty: 90 TABLET | Refills: 3 | Status: SHIPPED | OUTPATIENT
Start: 2025-03-06 | End: 2025-03-06

## 2025-03-06 RX ORDER — PANTOPRAZOLE SODIUM 40 MG/1
40 TABLET, DELAYED RELEASE ORAL DAILY
Qty: 90 TABLET | Refills: 3 | Status: SHIPPED | OUTPATIENT
Start: 2025-03-06 | End: 2025-03-06 | Stop reason: SDUPTHER

## 2025-03-06 RX ORDER — SERTRALINE HYDROCHLORIDE 100 MG/1
100 TABLET, FILM COATED ORAL DAILY
Qty: 30 TABLET | Refills: 1 | Status: SHIPPED | OUTPATIENT
Start: 2025-03-06 | End: 2026-03-06

## 2025-03-06 NOTE — PATIENT INSTRUCTIONS
[facility] anti-inflammatory diet trial recommended. Ulysses-Chi recommended
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
## 2025-03-07 ENCOUNTER — RESULTS FOLLOW-UP (OUTPATIENT)
Dept: PRIMARY CARE CLINIC | Facility: CLINIC | Age: 70
End: 2025-03-07
Payer: MEDICARE

## 2025-03-07 DIAGNOSIS — E03.9 HYPOTHYROIDISM, UNSPECIFIED TYPE: ICD-10-CM

## 2025-03-07 RX ORDER — SULFAMETHOXAZOLE AND TRIMETHOPRIM 800; 160 MG/1; MG/1
60 TABLET ORAL
Qty: 90 TABLET | Refills: 3 | Status: SHIPPED | OUTPATIENT
Start: 2025-03-07

## 2025-03-07 NOTE — TELEPHONE ENCOUNTER
Provider Staff:  Action required for this patient    Requires labs      Please see care gap opportunities below in Care Due Message.    Thanks!  [facility]     Appointments      Date Provider   Last Visit   3/6/2025 Grzegorz Bolton MD   Next Visit   Visit date not found Grzegorz Bolton MD     Refill Decision Note   Kalina Ivan  is requesting a refill authorization.  Brief Assessment and Rationale for Refill:  Approve     Medication Therapy Plan:         Comments:     Note composed:10:14 AM 03/07/2025

## 2025-03-07 NOTE — TELEPHONE ENCOUNTER
Care Due:                  Date            Visit Type   Department     Provider  --------------------------------------------------------------------------------                                EP -                              PR[place]      [facility]  Last Visit: 03-      CARE ([facility])   PRIMARY CARE   Grzegorz Bolton  Next Visit: None Scheduled  None         None Found                                                            Last  Test          Frequency    Reason                     Performed    Due Date  --------------------------------------------------------------------------------    CMP.........  12 months..  alendronate..............  03- 03-    Mg Level....  12 months..  alendronate..............  Not Found    Overdue    Phosphate...  12 months..  alendronate..............  Not Found    Overdue    [employer] Embedded Care Due Messages. Reference number: [id number].   3/07/2025 7:06:48 AM CST
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
## 2025-03-12 ENCOUNTER — OFFICE VISIT (OUTPATIENT)
Dept: PSYCHOLOGY | Facility: CLINIC | Age: 70
End: 2025-03-12
Payer: MEDICARE

## 2025-03-12 DIAGNOSIS — F32.A DEPRESSION, UNSPECIFIED DEPRESSION TYPE: ICD-10-CM

## 2025-03-12 DIAGNOSIS — F41.1 GENERALIZED ANXIETY DISORDER: Primary | ICD-10-CM

## 2025-03-12 PROCEDURE — 90791 PSYCH DIAGNOSTIC EVALUATION: CPT | Mod: HCNC,S$GLB,, | Performed by: PSYCHOLOGIST

## 2025-03-29 DIAGNOSIS — F32.0 CURRENT MILD EPISODE OF MAJOR DEPRESSIVE DISORDER WITHOUT PRIOR EPISODE: ICD-10-CM

## 2025-03-29 NOTE — TELEPHONE ENCOUNTER
No care due was identified.  [facility] Embedded Care Due Messages. Reference number: [id number].   3/29/2025 9:32:29 AM CDT

## 2025-03-30 NOTE — TELEPHONE ENCOUNTER
Refill Routing Note   Medication(s) are not appropriate for processing by [facility] for the following reason(s):        New or recently adjusted medication    [facility] action(s):  Defer        Medication Therapy Plan: Updated sig pend      Appointments  past 12m or future 3m with PCP    Date Provider   Last Visit   3/6/2025 Grzegorz Bolton MD   Next Visit   4/11/2025 Grzegorz Bolton MD   ED visits in past 90 days: 0        Note composed:3:47 PM 03/30/2025

## 2025-03-31 RX ORDER — SERTRALINE HYDROCHLORIDE 100 MG/1
100 TABLET, FILM COATED ORAL DAILY
Qty: 90 TABLET | Refills: 1 | Status: SHIPPED | OUTPATIENT
Start: 2025-03-31

## 2025-04-11 ENCOUNTER — OFFICE VISIT (OUTPATIENT)
Dept: PRIMARY CARE CLINIC | Facility: CLINIC | Age: 70
End: 2025-04-11
Payer: MEDICARE

## 2025-04-11 VITALS
HEART RATE: 75 BPM | BODY MASS INDEX: 20.28 KG/M2 | OXYGEN SATURATION: 98 % | SYSTOLIC BLOOD PRESSURE: 136 MMHG | RESPIRATION RATE: 18 BRPM | HEIGHT: 64 IN | DIASTOLIC BLOOD PRESSURE: 72 MMHG | WEIGHT: 118.81 LBS

## 2025-04-11 DIAGNOSIS — F32.0 CURRENT MILD EPISODE OF MAJOR DEPRESSIVE DISORDER WITHOUT PRIOR EPISODE: ICD-10-CM

## 2025-04-11 DIAGNOSIS — E73.9 LACTOSE INTOLERANCE: Primary | ICD-10-CM

## 2025-04-11 DIAGNOSIS — M79.671 RIGHT FOOT PAIN: ICD-10-CM

## 2025-04-11 DIAGNOSIS — K29.60 REFLUX GASTRITIS: ICD-10-CM

## 2025-04-11 DIAGNOSIS — M25.521 RIGHT ELBOW PAIN: ICD-10-CM

## 2025-04-11 PROCEDURE — 99999 PR PBB SHADOW E&M-EST. PATIENT-LVL III: CPT | Mod: PBBFAC,HCNC,, | Performed by: STUDENT IN AN ORGANIZED HEALTH CARE EDUCATION/TRAINING PROGRAM

## 2025-04-11 RX ORDER — TACROLIMUS 1 MG/G
OINTMENT TOPICAL 2 TIMES DAILY
COMMUNITY
Start: 2025-03-26

## 2025-04-11 RX ORDER — DICLOFENAC SODIUM 10 MG/G
2 GEL TOPICAL DAILY
Qty: 150 G | Refills: 5 | Status: SHIPPED | OUTPATIENT
Start: 2025-04-11

## 2025-04-11 RX ORDER — SERTRALINE HYDROCHLORIDE 100 MG/1
100 TABLET, FILM COATED ORAL DAILY
Qty: 90 TABLET | Refills: 3 | Status: SHIPPED | OUTPATIENT
Start: 2025-04-11

## 2025-04-11 RX ORDER — VALACYCLOVIR HYDROCHLORIDE 1 G/1
2000 TABLET, FILM COATED ORAL
COMMUNITY
Start: 2025-03-27

## 2025-04-11 RX ORDER — LANSOPRAZOLE 30 MG/1
30 CAPSULE, DELAYED RELEASE ORAL DAILY
Qty: 90 CAPSULE | Refills: 3 | Status: SHIPPED | OUTPATIENT
Start: 2025-04-11 | End: 2026-04-11

## 2025-04-11 NOTE — PROGRESS NOTES
"Subjective:       Patient ID: Kalina Ivan is a 70 y.o. female.    Chief Complaint: Follow-up    HPI:  70 y.o. female presents to [facility] here for follow-up visit    Acute concerns?: Patient reports was avoiding daiy and symptoms has improved. As was eating with others recently, re-exposed to some dairy and stool again loosened.     Following with Dermatology for facial skin sensitivity.    Just returned from trip to [place]    Feels that sertraline is working to help symptoms, though trip has also improved mood.    Would like to switch back to lansoprazole as less effective than pantoprazole.    Feels with recent increase in walking and pain in elbow has worsened due to higher physical activity with recent travel.    History of bariatric surgery, MI, renal disease, or GI bleed/ulcer?: No  On Blood thinners?: No    Review of Systems   Constitutional:  Positive for fatigue (With recent travel to [place] and [place]). Negative for chills, diaphoresis and fever.   HENT:  Negative for congestion, sinus pressure, sneezing and sore throat.    Respiratory:  Negative for cough and shortness of breath.    Cardiovascular:  Negative for chest pain and palpitations.   Gastrointestinal:  Negative for abdominal pain, diarrhea, nausea and vomiting.        Looser stool with dairy   Musculoskeletal:  Positive for arthralgias and joint swelling.   Skin:  Positive for rash (Follows with Dermatology for facial rash). Negative for wound.   Neurological:  Positive for weakness (Generalized). Negative for numbness.       Objective:      Vitals:    04/11/25 1321   BP: 136/72   BP Location: Right arm   Patient Position: Sitting   Pulse: 75   Resp: 18   SpO2: 98%   Weight: 53.9 kg (118 lb 13.3 oz)   Height: 5' 4" (1.626 m)     Physical Exam  Vitals reviewed.   Constitutional:       General: She is not in acute distress.     Appearance: Normal appearance. She is not ill-appearing.   HENT:      Head: Normocephalic and atraumatic. " "  Eyes:      General:         Right eye: No discharge.         Left eye: No discharge.      Conjunctiva/sclera: Conjunctivae normal.   Cardiovascular:      Rate and Rhythm: Normal rate.   Pulmonary:      Effort: Pulmonary effort is normal.   Musculoskeletal:         General: No deformity.   Skin:     Coloration: Skin is not jaundiced or pale.   Neurological:      General: No focal deficit present.      Mental Status: She is alert and oriented to person, place, and time.   Psychiatric:         Mood and Affect: Mood normal.         Behavior: Behavior normal.             Lab Results   Component Value Date     03/07/2025    K 4.1 03/07/2025     03/07/2025    CO2 27 03/07/2025    BUN 15 03/07/2025    CREATININE 0.7 03/07/2025    ANIONGAP 9 03/07/2025     Lab Results   Component Value Date    HGBA1C 5.4 08/15/2022     No results found for: "BNP", "BNPTRIAGEBLO"    Lab Results   Component Value Date    WBC 6.21 03/07/2025    HGB 13.7 03/07/2025    HCT 42.1 03/07/2025     03/07/2025    GRAN 3.2 03/07/2025    GRAN 51.9 03/07/2025     Lab Results   Component Value Date    CHOL 236 (H) 03/07/2025    HDL 68 03/07/2025    LDLCALC 139.0 03/07/2025    TRIG 145 03/07/2025        Current Medications[1]        Assessment:       1. Lactose intolerance    2. Current mild episode of major depressive disorder without prior episode    3. Reflux gastritis    4. Right foot pain    5. Right elbow pain           Plan:       Lactose intolerance  -     Hydrogen Breath test (HBT) - Lactose deficiency; Future    Current mild episode of major depressive disorder without prior episode  -     sertraline (ZOLOFT) 100 MG tablet; Take 1 tablet (100 mg total) by mouth once daily.  Dispense: 90 tablet; Refill: 3  - Recommend 1 year of use before discontinuing    Reflux gastritis  -     lansoprazole (PREVACID) 30 MG capsule; Take 1 capsule (30 mg total) by mouth once daily.  Dispense: 90 capsule; Refill: 3    Right foot pain  Right " elbow pain  -     diclofenac sodium (VOLTAREN) 1 % Gel; Apply 2 g topically once daily.  Dispense: 150 g; Refill: 5  - RICE therapy recommended    RTC in 6 months         [1]   Current Outpatient Medications:     alendronate (FOSAMAX) 70 MG tablet, Take 1 tablet (70 mg total) by mouth every 7 days., Disp: 12 tablet, Rfl: 3    ARMOUR THYROID 60 mg Tab, TAKE 1 TABLET BY MOUTH DAILY BEFORE BREAKFAST., Disp: 90 tablet, Rfl: 3    calcium-vitamin D 600 mg-10 mcg (400 unit) Tab, TAKE 1 TABLET BY MOUTH TWICE A DAY, Disp: 180 tablet, Rfl: 0    gabapentin (NEURONTIN) 300 MG capsule, Take 1 capsule (300 mg total) by mouth nightly., Disp: 30 capsule, Rfl: 5    tacrolimus (PROTOPIC) 0.1 % ointment, Apply topically 2 (two) times daily., Disp: , Rfl:     valACYclovir (VALTREX) 1000 MG tablet, Take 2,000 mg by mouth., Disp: , Rfl:     diclofenac sodium (VOLTAREN) 1 % Gel, Apply 2 g topically once daily., Disp: 150 g, Rfl: 5    lansoprazole (PREVACID) 30 MG capsule, Take 1 capsule (30 mg total) by mouth once daily., Disp: 90 capsule, Rfl: 3    sertraline (ZOLOFT) 100 MG tablet, Take 1 tablet (100 mg total) by mouth once daily., Disp: 90 tablet, Rfl: 3

## 2025-04-14 ENCOUNTER — TELEPHONE (OUTPATIENT)
Dept: PSYCHOLOGY | Facility: CLINIC | Age: 70
End: 2025-04-14
Payer: MEDICARE

## 2025-04-28 ENCOUNTER — TELEPHONE (OUTPATIENT)
Dept: PSYCHOLOGY | Facility: CLINIC | Age: 70
End: 2025-04-28
Payer: MEDICARE

## 2025-04-29 ENCOUNTER — OFFICE VISIT (OUTPATIENT)
Dept: PSYCHOLOGY | Facility: CLINIC | Age: 70
End: 2025-04-29
Payer: MEDICARE

## 2025-04-29 DIAGNOSIS — F32.A DEPRESSION, UNSPECIFIED DEPRESSION TYPE: ICD-10-CM

## 2025-04-29 DIAGNOSIS — F41.1 GENERALIZED ANXIETY DISORDER: Primary | ICD-10-CM

## 2025-04-29 PROCEDURE — 90834 PSYTX W PT 45 MINUTES: CPT | Mod: HCNC,S$GLB,, | Performed by: PSYCHOLOGIST
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
## 2025-05-21 ENCOUNTER — OFFICE VISIT (OUTPATIENT)
Dept: PRIMARY CARE CLINIC | Facility: CLINIC | Age: 70
End: 2025-05-21
Payer: MEDICARE

## 2025-05-21 VITALS
WEIGHT: 119.06 LBS | BODY MASS INDEX: 20.32 KG/M2 | DIASTOLIC BLOOD PRESSURE: 86 MMHG | HEART RATE: 80 BPM | SYSTOLIC BLOOD PRESSURE: 124 MMHG | TEMPERATURE: 98 F | RESPIRATION RATE: 16 BRPM | HEIGHT: 64 IN | OXYGEN SATURATION: 98 %

## 2025-05-21 DIAGNOSIS — M54.2 ACUTE NECK PAIN: ICD-10-CM

## 2025-05-21 DIAGNOSIS — G47.01 INSOMNIA DUE TO MEDICAL CONDITION: ICD-10-CM

## 2025-05-21 DIAGNOSIS — R19.7 DIARRHEA, UNSPECIFIED TYPE: Primary | ICD-10-CM

## 2025-05-21 DIAGNOSIS — E03.9 HYPOTHYROIDISM, UNSPECIFIED TYPE: ICD-10-CM

## 2025-05-21 PROCEDURE — 99999 PR PBB SHADOW E&M-EST. PATIENT-LVL V: CPT | Mod: PBBFAC,HCNC,, | Performed by: STUDENT IN AN ORGANIZED HEALTH CARE EDUCATION/TRAINING PROGRAM

## 2025-05-21 RX ORDER — GABAPENTIN 300 MG/1
300 CAPSULE ORAL NIGHTLY
Qty: 30 CAPSULE | Refills: 5 | Status: SHIPPED | OUTPATIENT
Start: 2025-05-21

## 2025-05-21 RX ORDER — KETOROLAC TROMETHAMINE 30 MG/ML
30 INJECTION, SOLUTION INTRAMUSCULAR; INTRAVENOUS
Status: COMPLETED | OUTPATIENT
Start: 2025-05-21 | End: 2025-05-21

## 2025-05-21 RX ORDER — THYROID 60 MG/1
60 TABLET ORAL
Qty: 90 TABLET | Refills: 3 | Status: SHIPPED | OUTPATIENT
Start: 2025-05-21

## 2025-05-21 RX ORDER — KETOROLAC TROMETHAMINE 10 MG/1
10 TABLET, FILM COATED ORAL EVERY 6 HOURS
Qty: 20 TABLET | Refills: 0 | Status: SHIPPED | OUTPATIENT
Start: 2025-05-22 | End: 2025-05-27

## 2025-05-21 RX ADMIN — KETOROLAC TROMETHAMINE 30 MG: 30 INJECTION, SOLUTION INTRAMUSCULAR; INTRAVENOUS at 03:05

## 2025-05-21 NOTE — PROGRESS NOTES
Subjective:       Patient ID: Kalina Ivan is a 70 y.o. female.    Chief Complaint: Diarrhea (No difference in symptoms - giving up dairy. Completed 30 days of Doxy ), Gas, Neck Pain (Difficulty w/ ROM), Back Pain, and Migraine (Occipital )    HPI:  70 y.o. female presents to [facility] for follow-up of diarrhea.    Patient reports still having diarrhea. Lactose and dairy does not appear to have much improvement with diarrhea. Occurs 4 times or more daily. Can be urgent at times. Eating healthy. Sometimes very liquid in consistency. Can have pain in lower abdomen associated with BM. Feels she has been losing some weight. Hasn't spoken to Gastroenterology.    Patient reports that her neck has been hurting. [place] airport and sat hard with pain that radiated from her back into her neck. Has been intense. Had headache with some loss of vision and saw Ophthalmology who patient reports thought it could be a headache driven by possible occipital migraine.    Onset?: 4/10/2025 sat on bench with back pain about 1 week later. Neck pain began 4/23/2025 and worsened with occipital migraine 5/7/2025  Progression?: Worsening, always present  Inciting incident/new physical activity?: Hard seat with drop onto bench at airport  Past trauma/surgery?: No  Recurrent?: No  Description?: Numb in lateral neck, dull ache, occasional sharp, not definitively associated with movement.  Radiates?: Right shoulder has already had pain  Severity?: 8.5/10 at worst fleeting and stops in tracks  Worsening factors?: Turning head suddenly, heavy lifting  Interventions?: Gabapentin, heat, ice, manual massage  Did interventions help?: No significant relief  History of bariatric surgery, MI, renal disease, or GI bleed/ulcer?: No  On Blood thinners?: No  Numbness limited to lateral neck and face    Objective:      Vitals:    05/21/25 1432   BP: 124/86   BP Location: Right arm   Patient Position: Sitting   Pulse: 80  "  Resp: 16   Temp: 97.5 °F (36.4 °C)   TempSrc: Oral   SpO2: 98%   Weight: 54 kg (119 lb 0.8 oz)   Height: 5' 4" (1.626 m)     Physical Exam  Vitals reviewed.   Constitutional:       General: She is not in acute distress.     Appearance: Normal appearance. She is not ill-appearing.   HENT:      Head: Normocephalic and atraumatic.   Eyes:      General:         Right eye: No discharge.         Left eye: No discharge.      Conjunctiva/sclera: Conjunctivae normal.   Cardiovascular:      Rate and Rhythm: Normal rate.   Pulmonary:      Effort: Pulmonary effort is normal.   Musculoskeletal:         General: No deformity.      Cervical back: No swelling, edema, deformity, erythema, signs of trauma, lacerations, rigidity, spasms, torticollis, tenderness, bony tenderness or crepitus. Pain with movement (Limites extension, bilateral rotation, bilateral left ear to shoulder > right ear to shoulder, pain on forward flexion) present. Decreased range of motion.   Skin:     Coloration: Skin is not jaundiced or pale.   Neurological:      General: No focal deficit present.      Mental Status: She is alert and oriented to person, place, and time.   Psychiatric:         Mood and Affect: Mood normal.         Behavior: Behavior normal.             Lab Results   Component Value Date     03/07/2025    K 4.1 03/07/2025     03/07/2025    CO2 27 03/07/2025    BUN 15 03/07/2025    CREATININE 0.7 03/07/2025    ANIONGAP 9 03/07/2025     Lab Results   Component Value Date    HGBA1C 5.4 08/15/2022     No results found for: "BNP", "BNPTRIAGEBLO"    Lab Results   Component Value Date    WBC 6.21 03/07/2025    HGB 13.7 03/07/2025    HCT 42.1 03/07/2025     03/07/2025    GRAN 3.2 03/07/2025    GRAN 51.9 03/07/2025     Lab Results   Component Value Date    CHOL 236 (H) 03/07/2025    HDL 68 03/07/2025    LDLCALC 139.0 03/07/2025    TRIG 145 03/07/2025        Current Medications[1]        Assessment:       1. Diarrhea, unspecified type "    2. Hypothyroidism, unspecified type    3. Acute neck pain           Plan:       Diarrhea, unspecified type  -     H. pylori antigen, stool; Future; Expected date: 05/21/2025  -     Pancreatic elastase, fecal; Future; Expected date: 05/21/2025  -     Fecal fat, qualitative; Future; Expected date: 05/21/2025  -     Occult blood x 1, stool; Future; Expected date: 05/21/2025  -     WBC, Stool; Future; Expected date: 05/21/2025  -     Rotavirus antigen, stool; Future; Expected date: 05/21/2025  -     Adenovirus Molecular Detection, PCR, Non-Blood Stool; Future; Expected date: 05/21/2025  -     Calprotectin, Stool; Future; Expected date: 05/21/2025  -     Giardia / Cryptosporidum, EIA; Future; Expected date: 05/21/2025  -     Stool Exam-Ova,Cysts,Parasites; Future; Expected date: 05/21/2025  -     Clostridium difficile EIA; Future; Expected date: 05/21/2025  -     Stool culture; Future; Expected date: 05/21/2025  -     Ambulatory referral/consult to Gastroenterology; Future; Expected date: 05/28/2025  - With persistence of symptoms, will order adria studies and schedule with Gastroenterology    Acute neck pain  -     ketorolac injection 30 mg  -     ketorolac (TORADOL) 10 mg tablet; Take 1 tablet (10 mg total) by mouth every 6 (six) hours. Do not combine with ibuprofen or naproxen for 5 days  Dispense: 20 tablet; Refill: 0  -     X-Ray Cervical Spine 2 or 3 Views; Future; Expected date: 05/21/2025  -     Ambulatory Referral/Consult to Physical Therapy  - Possible Occipital Neuralgia with distribution  - RICE therapy recommended  - If not improving in 4-6 weeks, consider MRI and/or Back and Spine referral  - Can consider Pain referral in future for target Occipital injection    Hypothyroidism, unspecified type  -     thyroid, pork, (ARMOUR THYROID) 60 mg Tab; Take 1 tablet (60 mg total) by mouth before breakfast.  Dispense: 90 tablet; Refill: 3    RTC in 6 weeks           [1]   Current Outpatient Medications:      alendronate (FOSAMAX) 70 MG tablet, Take 1 tablet (70 mg total) by mouth every 7 days., Disp: 12 tablet, Rfl: 3    calcium-vitamin D 600 mg-10 mcg (400 unit) Tab, TAKE 1 TABLET BY MOUTH TWICE A DAY, Disp: 180 tablet, Rfl: 0    gabapentin (NEURONTIN) 300 MG capsule, TAKE 1 CAPSULE BY MOUTH EVERY DAY AT NIGHT (Patient taking differently: Take 300 mg by mouth nightly as needed.), Disp: 30 capsule, Rfl: 5    lansoprazole (PREVACID) 30 MG capsule, Take 1 capsule (30 mg total) by mouth once daily., Disp: 90 capsule, Rfl: 3    sertraline (ZOLOFT) 100 MG tablet, Take 1 tablet (100 mg total) by mouth once daily., Disp: 90 tablet, Rfl: 3    tacrolimus (PROTOPIC) 0.1 % ointment, Apply topically 2 (two) times daily., Disp: , Rfl:     valACYclovir (VALTREX) 1000 MG tablet, Take 2,000 mg by mouth daily as needed (with outbreak)., Disp: , Rfl:     diclofenac sodium (VOLTAREN) 1 % Gel, Apply 2 g topically once daily. (Patient not taking: Reported on 5/21/2025), Disp: 150 g, Rfl: 5    [START ON 5/22/2025] ketorolac (TORADOL) 10 mg tablet, Take 1 tablet (10 mg total) by mouth every 6 (six) hours. Do not combine with ibuprofen or naproxen for 5 days, Disp: 20 tablet, Rfl: 0    thyroid, pork, (ARMOUR THYROID) 60 mg Tab, Take 1 tablet (60 mg total) by mouth before breakfast., Disp: 90 tablet, Rfl: 3    Current Facility-Administered Medications:     ketorolac injection 30 mg, 30 mg, Intramuscular, 1 time in Clinic/HOD,

## 2025-05-21 NOTE — PROGRESS NOTES
Verified pt ID using name and . Allergies verified with pt. Administered Toradol 30mg IM in Left VG per physician order using aseptic technique. Aspirated and no blood return noted. Pt tolerated well with no adverse reactions noted.

## 2025-05-21 NOTE — TELEPHONE ENCOUNTER
Care Due:                  Date            Visit Type   Department     Provider  --------------------------------------------------------------------------------                                 -                              [facility]IMARY [facility]  Last Visit: 04-      CARE ([facility])   PRIMARY CARE   Grzegorz Bolton                              EP - [facility]  Next Visit: 10-      CARE ([facility])   PRIMARY CARE   Grzegorz Bolton                                                            Last  Test          Frequency    Reason                     Performed    Due Date  --------------------------------------------------------------------------------    Mg Level....  12 months..  alendronate..............  Not Found    Overdue    Phosphate...  12 months..  alendronate..............  Not Found    Overdue    [employer] Embedded Care Due Messages. Reference number: [id number].   5/21/2025 6:32:35 AM CDT   Pt. admitted for syncope, found to have bradycardia. Per radiology report, CT head revealed no acute hemorrhage, mass or mass effect.

## 2025-05-22 ENCOUNTER — RESULTS FOLLOW-UP (OUTPATIENT)
Dept: PRIMARY CARE CLINIC | Facility: CLINIC | Age: 70
End: 2025-05-22

## 2025-05-22 PROCEDURE — 87427 SHIGA-LIKE TOXIN AG IA: CPT | Mod: 59,HCNC | Performed by: STUDENT IN AN ORGANIZED HEALTH CARE EDUCATION/TRAINING PROGRAM

## 2025-05-22 PROCEDURE — 87425 ROTAVIRUS AG IA: CPT | Mod: HCNC | Performed by: STUDENT IN AN ORGANIZED HEALTH CARE EDUCATION/TRAINING PROGRAM

## 2025-05-22 PROCEDURE — 82653 EL-1 FECAL QUANTITATIVE: CPT | Mod: HCNC | Performed by: STUDENT IN AN ORGANIZED HEALTH CARE EDUCATION/TRAINING PROGRAM

## 2025-05-22 PROCEDURE — 87046 STOOL CULTR AEROBIC BACT EA: CPT | Mod: HCNC | Performed by: STUDENT IN AN ORGANIZED HEALTH CARE EDUCATION/TRAINING PROGRAM

## 2025-05-22 PROCEDURE — 87046 STOOL CULTR AEROBIC BACT EA: CPT | Mod: 91,HCNC | Performed by: STUDENT IN AN ORGANIZED HEALTH CARE EDUCATION/TRAINING PROGRAM

## 2025-05-22 PROCEDURE — 89055 LEUKOCYTE ASSESSMENT FECAL: CPT | Mod: HCNC | Performed by: STUDENT IN AN ORGANIZED HEALTH CARE EDUCATION/TRAINING PROGRAM

## 2025-05-23 ENCOUNTER — TELEPHONE (OUTPATIENT)
Dept: PSYCHOLOGY | Facility: CLINIC | Age: 70
End: 2025-05-23
Payer: MEDICARE

## 2025-05-26 ENCOUNTER — OFFICE VISIT (OUTPATIENT)
Dept: PSYCHOLOGY | Facility: CLINIC | Age: 70
End: 2025-05-26
Payer: MEDICARE

## 2025-05-26 DIAGNOSIS — F41.1 GENERALIZED ANXIETY DISORDER: Primary | ICD-10-CM

## 2025-05-26 DIAGNOSIS — F32.A DEPRESSION, UNSPECIFIED DEPRESSION TYPE: ICD-10-CM

## 2025-05-26 PROCEDURE — 90834 PSYTX W PT 45 MINUTES: CPT | Mod: HCNC,95,, | Performed by: PSYCHOLOGIST

## 2025-05-26 NOTE — PROGRESS NOTES
"Outpatient Psychology Follow-up    Date of Service: 2025    Time: 7:19 AM  Name: Kalina Ivan  Age: 70 y.o.             : 1955               Visit Length: 45 Minutes      Type of treatment provided:  Individual Therapy      Chief Complaint:   Kalina Ivan is a 70 y.o.  female who presents today for follow-up of anxiety, depression   Met with patient.      Treatment Modality/Intervention: Individual Psychotherapy    The patient location is: At home in [place]  The chief complaint leading to consultation is: anxiety, depression    Visit type: audiovisual    Face to Face time with patient: 45 minutes  60 minutes of total time spent on the encounter, which includes face to face time and non-face to face time preparing to see the patient (eg, review of tests), Obtaining and/or reviewing separately obtained history, Documenting clinical information in the electronic or other health record, Independently interpreting results (not separately reported) and communicating results to the patient/family/caregiver, or Care coordination (not separately reported).         Each patient to whom he or she provides medical services by telemedicine is:  (1) informed of the relationship between the physician and patient and the respective role of any other health care provider with respect to management of the patient; and (2) notified that he or she may decline to receive medical services by telemedicine and may withdraw from such care at any time.          History of Present Illness    CHIEF COMPLAINT:  Patient presents with persistent low mood and decreased motivation since returning from a training in [place], expressing concerns about medication effectiveness and social withdrawal.    HPI:  Patient reports feeling "pretty blue" and lacking motivation since returning from a training in [place]. She describes difficulty sustaining social interactions and has been avoiding social events. Patient is currently taking " "sertraline 100mg daily, prescribed by her PCP about two months ago, but expresses uncertainty about its effectiveness, stating, "I honestly can't tell if it's working." She also mentions feeling "over-medicated" and uncomfortable with taking multiple medications.    Patient describes a pattern of social withdrawal, including not reaching out to friends and declining invitations. She feels hurt by not being included in a "coffee clutch" formed by some of her acquaintances. Patient reports experiencing neck pain, which she believes might be occipital neuralgia. She mentions having had an occipital migraine a few weeks ago, which included temporary vision loss. The neck pain is described as including "sudden stabs of pain."    Patient's air conditioning is broken, causing discomfort due to high temperatures in her home, affecting her sleep and overall comfort. She acknowledges a complete loss of her usual routine since returning from [place], including not using her journal or practicing her usual gratitude and intention exercises. Patient briefly mentions unresolved childhood trauma, stating, "I think I spent my whole childhood in the freeze state," relating this to her current tendency to withdraw and avoid activities.    LIFESTYLE:  Patient worked as a  for years, living on schedules and being skilled at creating them. She currently lives in a rental house. Patient reported issues with her air conditioning, which broke two nights ago, causing the house to become very hot (around 90 degrees). She enjoys music and mentioned wanting to attend but not attending a  at [facility] where friends were playing. Patient also enjoys reading. She recently returned from a training in [place] that she enjoyed. Since returning, the patient has been struggling to maintain social connections, often canceling plans or not reaching out to friends. She expresses a desire to be more socially active but has " been finding it difficult to motivate herself.    MEDICATIONS:  Patient is on a Thyroid medication daily for a thyroid condition, which she has been taking for years. She takes Alendronate once a week for osteoporosis. For bone health, she takes Calcium twice daily and Vitamin D3 twice daily. Patient is on daily Lansoprazole for silent reflux. She started Sertraline 100 mg daily about 2 months ago for depression, with the dosage recently increased from 50 mg, though the effectiveness is uncertain. Gabapentin is taken occasionally for sleep and nerve pain. Patient recently discontinued Doxycycline after taking it for a month. She has been prescribed an anti-inflammatory medication by her PCP for neck pain.    SUBSTANCE USE:  Patient is not currently drinking alcohol due to her medications.            Patient's response to intervention:  The patient's response to intervention is accepting, motivated  Progress toward goals and other mental status changes:  The patient's progress toward goals is fair .        Verbal deficits: None        Psychological Problem List: Problem List[1]        Psychiatric    Physical Exam    Appearance: Appears stated age. Well-groomed. Well-nourished.  Speech: Normal rate. Normal volume. Spontaneous and fluid.  Affect: Slightly restricted affect.  Thought Content: No evidence of aggression. No evidence of homicidal ideation. No evidence of homicidal plan. No evidence of homicidal intent. No evidence of suicidal ideation. No evidence of suicidal plan. No evidence of suicidal intent. No evidence of delusions.  Memory: Recent memory intact. Remote memory intact.  Behavior: Cooperative. Good eye contact. Engaged. Pleasant.  Mood: Dysphoric.  Thought Form: Linear thinking. Goal oriented and directed.  Perception: No perceptual abnormalities noted.  Judgement: Intact as evidenced by decision making in the recent past.  Insight: Good insight into symptoms. Good insight into treatment  options.  Cognition: A&Ox3. Normal attention span. Average fund of knowledge.  Motor: No gross motor abnormalities.                    Risk parameters:  Patient reports no suicidal ideation  Patient reports no homicidal ideation  Patient reports no self-injurious behavior  Patient reports no violent behavior  Patient has been given the Suicide crisis line phone number.      Plan:  individual psychotherapy      Diagnosis:    ICD-10-CM ICD-9-CM   1. Generalized anxiety disorder  F41.1 300.02   2. Depression, unspecified depression type  F32.A 311       Assessment & Plan    IMPRESSION:   Assessed current mood and functioning, noting symptoms of depression and social withdrawal.   Discussed talking to her PCP Dr. Bolton about the effectiveness of sertraline 100 mg daily, which was prescribed 2 months ago.   Evaluated impact of recent hospitalization and childhood experiences on current mental state.   Recommend lifestyle modifications to improve mood and functioning.     Implement daily routine and structure for mental health   Practice gratitude and set intentions daily   Use positive self-talk to challenge negative thoughts   Increase social interactions, starting with manageable activities   Create and adhere to a weekly schedule including social and exercise activities   Resume use of journal for daily planning   Strategies to push through initial discomfort to engage in social activities   Follow-up on June 17th as scheduled   Additional follow-ups 2 weeks and 4 weeks after June 17th appointment     Discussed relationship between thoughts, emotions, and behaviors.   Explained importance of routine and structure for mental health.   Educated on benefits of gratitude practice and intention setting.   Patient to resume use of journal for daily planning and organization.   Patient to implement morning intention setting and nightly gratitude practice.   Patient to create and adhere to a weekly schedule, including  planned social activities and exercise.   Patient to push through initial discomfort to engage in social activities and outings.   Patient to use positive self-talk to challenge negative thoughts about aging and physical limitations.   Patient to reach out to friends more frequently, starting with short, manageable social interactions like coffee meetings.     Follow up on June 17th as scheduled, with additional follow-ups in 2 weeks and 4 weeks after June 17th appointment.         Target symptoms: depression, anxiety   Why chosen therapy is appropriate versus another modality: relevant to diagnosis, patient responds to this modality, evidence based practice  Outcome monitoring methods: self-report, observation  Therapeutic intervention type: insight oriented psychotherapy, behavior modifying psychotherapy   Topics discussed/themes: building skills sets for symptom management, symptom recognition   The patient's response to the intervention is accepting, motivated.    The patient's progress toward treatment goals is fair.   Encouraged patient to keep future appointments.  Instruct patient to call or message with questions.  In the event of an emergency, including suicidal ideation, plan or intent or homicidal ideation, plan or intent, patient advised to call [phone] or present at the nearest ED.       Treatment Goals:   Reduce overall frequency, intensity, and duration of the anxiety so that daily functioning is not impaired.  Stabilize anxiety level while increasing ability to function on a daily basis.  Resolve the core conflict that is the source of anxiety.  Enhance ability to effectively cope with the full variety of life's worries and anxieties.  Alleviate depressive symptoms and return to previous level of effective functioning.    Follow-up Plan:     individual psychotherapy      Return to clinic: Follow up in about 2 weeks (around 6/9/2025).    This note was generated with the assistance of [employer]  listening technology. Verbal consent was obtained by the patient and accompanying visitor(s) for the recording of patient appointment to facilitate this note. I attest to having reviewed and edited the generated note for accuracy, though some syntax or spelling errors may persist. Please contact the author of this note for any clarification.                     [1]   Patient Active Problem List  Diagnosis    Malleolar fracture, right, closed, initial encounter    Closed fracture of head of right radius    Hypothyroidism    Osteoporosis

## 2025-06-05 ENCOUNTER — OFFICE VISIT (OUTPATIENT)
Dept: GASTROENTEROLOGY | Facility: CLINIC | Age: 70
End: 2025-06-05
Payer: MEDICARE

## 2025-06-05 ENCOUNTER — PATIENT MESSAGE (OUTPATIENT)
Dept: PRIMARY CARE CLINIC | Facility: CLINIC | Age: 70
End: 2025-06-05
Payer: MEDICARE

## 2025-06-05 VITALS
WEIGHT: 119.69 LBS | HEIGHT: 64 IN | HEART RATE: 79 BPM | BODY MASS INDEX: 20.43 KG/M2 | DIASTOLIC BLOOD PRESSURE: 71 MMHG | SYSTOLIC BLOOD PRESSURE: 118 MMHG

## 2025-06-05 DIAGNOSIS — R11.0 NAUSEA: ICD-10-CM

## 2025-06-05 DIAGNOSIS — R19.7 DIARRHEA, UNSPECIFIED TYPE: ICD-10-CM

## 2025-06-05 DIAGNOSIS — R19.4 CHANGE IN BOWEL HABITS: Primary | ICD-10-CM

## 2025-06-05 PROCEDURE — 99214 OFFICE O/P EST MOD 30 MIN: CPT | Mod: HCNC,S$GLB,,

## 2025-06-05 PROCEDURE — 1159F MED LIST DOCD IN RCRD: CPT | Mod: CPTII,HCNC,S$GLB,

## 2025-06-05 PROCEDURE — 1101F PT FALLS ASSESS-DOCD LE1/YR: CPT | Mod: CPTII,HCNC,S$GLB,

## 2025-06-05 PROCEDURE — 1160F RVW MEDS BY RX/DR IN RCRD: CPT | Mod: CPTII,HCNC,S$GLB,

## 2025-06-05 PROCEDURE — 3288F FALL RISK ASSESSMENT DOCD: CPT | Mod: CPTII,HCNC,S$GLB,

## 2025-06-05 PROCEDURE — 3078F DIAST BP <80 MM HG: CPT | Mod: CPTII,HCNC,S$GLB,

## 2025-06-05 PROCEDURE — 3008F BODY MASS INDEX DOCD: CPT | Mod: CPTII,HCNC,S$GLB,

## 2025-06-05 PROCEDURE — 1126F AMNT PAIN NOTED NONE PRSNT: CPT | Mod: CPTII,HCNC,S$GLB,

## 2025-06-05 PROCEDURE — 3074F SYST BP LT 130 MM HG: CPT | Mod: CPTII,HCNC,S$GLB,

## 2025-06-05 PROCEDURE — 99999 PR PBB SHADOW E&M-EST. PATIENT-LVL IV: CPT | Mod: PBBFAC,HCNC,,

## 2025-06-05 RX ORDER — CHOLESTYRAMINE 4 G/9G
4 POWDER, FOR SUSPENSION ORAL DAILY
Qty: 30 PACKET | Refills: 3 | Status: SHIPPED | OUTPATIENT
Start: 2025-06-05

## 2025-06-05 RX ORDER — DICYCLOMINE HYDROCHLORIDE 10 MG/1
10 CAPSULE ORAL 3 TIMES DAILY PRN
Qty: 30 CAPSULE | Refills: 2 | Status: SHIPPED | OUTPATIENT
Start: 2025-06-05

## 2025-06-05 NOTE — H&P (VIEW-ONLY)
Gastroenterology Clinic Consultation Note    Patient ID: Kalina Ivan is a 70 y.o. female.    Chief Complaint: Diarrhea    History of Present Illness    CHIEF COMPLAINT:  Patient presents today for chronic diarrhea and upset stomach since February.    GASTROINTESTINAL:  She experiences diarrhea 4-6 times daily, with symptoms worse in the morning and night, requiring nighttime bowel movements. She reports excessive gas and occasional liquid stools. She describes constant mild nausea and an unusual taste in her mouth, but denies vomiting. Her diet consists primarily of beans, grains, fruits, and vegetables, with milk in tea. A previous trial of dairy elimination did not improve symptoms. Due to these symptoms, she reports decreased social activities and new onset fatigue requiring daily afternoon naps.  Stool studies done all negative.  Lab work done in March of this year unremarkable. Denies unintentional weight loss, fever, chills, vomiting, constipation, esophageal reflux, regurgitation, hematemesis, difficulties swallowing, changes in bowel habits, changes in stool caliber, blood in stool, and abdominal pain.      MEDICAL HISTORY:  She has a history of Giardia from 30 years ago. Upper endoscopy was performed in January of this year and last colonoscopy was performed on June 30, 2021.    LABS AND TESTING:  Multiple stool studies were negative for pathogens. March bloodwork showed elevated cholesterol but was otherwise normal.      ROS:  General: -fever, -chills, +fatigue, -weight gain, -weight loss  Eyes: -vision changes, -redness, -discharge  ENT: -ear pain, -nasal congestion, -sore throat  Cardiovascular: -chest pain, -palpitations, -lower extremity edema  Respiratory: -cough, -shortness of breath  Gastrointestinal: -abdominal pain, +nausea, -vomiting, +diarrhea, -constipation, -blood in stool, +change in bowel habits, +excessive flatus  Genitourinary: -dysuria, -hematuria, -frequency  Musculoskeletal:  -joint pain, -muscle pain  Skin: -rash, -lesion  Neurological: -headache, -dizziness, -numbness, -tingling  Psychiatric: -anxiety, -depression, -sleep difficulty         Physical Exam  Vitals and nursing note reviewed.   Constitutional:       General: She is not in acute distress.     Appearance: Normal appearance. She is not ill-appearing.   HENT:      Head: Normocephalic and atraumatic.      Right Ear: External ear normal.      Left Ear: External ear normal.      Nose: Nose normal.   Eyes:      General: No scleral icterus.     Extraocular Movements: Extraocular movements intact.   Cardiovascular:      Rate and Rhythm: Normal rate.   Pulmonary:      Effort: Pulmonary effort is normal. No respiratory distress.   Abdominal:      General: There is no distension.      Palpations: Abdomen is soft.      Tenderness: There is no guarding.   Musculoskeletal:         General: Normal range of motion.      Cervical back: Normal range of motion.   Skin:     General: Skin is warm.   Neurological:      Mental Status: She is alert and oriented to person, place, and time.   Psychiatric:         Mood and Affect: Mood normal.         Behavior: Behavior is cooperative.         Thought Content: Thought content normal.              Medical History:  has a past medical history of Hypothyroidism and Osteoporosis.    Surgical History:  has a past surgical history that includes ORIF, ANKLE - Right (Right, 03/27/2023); Open reduction and internal fixation (ORIF) of injury of ankle (Right, 03/27/2023); Fracture surgery; egd, with closed biopsy (01/09/2025); and Esophagogastroduodenoscopy (N/A, 1/9/2025).    Family History: family history includes Alcohol abuse in her father; Cancer in her father; Diabetes in her mother; Early death in her father; Heart disease in her mother..       Review of patient's allergies indicates:   Allergen Reactions    Hazelnut Shortness Of Breath       Medications Ordered Prior to Encounter[1]    Labs:  Lab Results  "  Component Value Date    WBC 6.21 03/07/2025    HGB 13.7 03/07/2025    HCT 42.1 03/07/2025     03/07/2025    CHOL 236 (H) 03/07/2025    TRIG 145 03/07/2025    HDL 68 03/07/2025    ALKPHOS 48 03/07/2025    ALT 22 03/07/2025    AST 20 03/07/2025     03/07/2025    K 4.1 03/07/2025     03/07/2025    CREATININE 0.7 03/07/2025    BUN 15 03/07/2025    CO2 27 03/07/2025    TSH 3.999 03/07/2025    INR 0.9 04/21/2021    HGBA1C 5.4 08/15/2022       Vital Signs:  /71   Pulse 79   Ht 5' 4" (1.626 m)   Wt 54.3 kg (119 lb 11.4 oz)   BMI 20.55 kg/m²   Body mass index is 20.55 kg/m².    Imaging reviewed:   MRI PELVIS WITHOUT CONTRAST     CLINICAL HISTORY:  .  Lumbago with sciatica, right side     TECHNIQUE:  Routine MRI evaluation of the pelvis performed without contrast.     COMPARISON:  None.     FINDINGS:  BONES: No fracture.  No avascular necrosis.  No marrow infiltrative process.     FEMOROACETABULAR JOINTS: Questionable chondral fissuring about the bilateral superior weight-bearing acetabular rims.  No subchondral edema.  No significant joint effusion.     TENDONS: Mild thickening and increased signal intensity of the distal insertional fibers of the right gluteus minimus tendon with mild peritendinous edema.  Mild increased signal of the conjoined tendon of the right hamstring complex with mild associated subcortical edema at the ischial tuberosity.  Left gluteus minimus, left hamstring, bilateral gluteus medius, iliopsoas, adductors, rectus femoris and hip external rotators are normal.     MUSCLES: Normal bulk and signal intensity.     MISCELLANEOUS: SI joints are symmetric.  There are degenerative changes of the visualized lower lumbar spine.  Ischiofemoral spaces are normal and symmetric.  Sciatic nerves demonstrate symmetric contour and signal intensity.  Small Tarlov cyst at the left S2-S3 foramen.  Pubic symphysis appears normal.  Visualized lower abdominal and pelvic organs demonstrate no " significant abnormalities.     Impression:     1. Mild right gluteus minimus tendinosis and peritendinitis.  2. Mild right hamstring tendinosis with mild subcortical edema at the ischial tuberosity.        Electronically signed by:Lucas Foster MD  Date:                                            11/05/2022      Endoscopy reviewed:   Findings:       The esophagus was normal.        A small hiatal hernia was present.        The exam of the stomach was otherwise normal.        Biopsies were taken with a cold forceps for Helicobacter pylori        testing. Verification of patient identification for the specimen was        done. Estimated blood loss was minimal.        The examined duodenum was normal.   Impression:            - Normal esophagus.                          - Small hiatal hernia.                          - Normal examined duodenum.                          - Biopsies were taken with a cold forceps for                          Helicobacter pylori testing.   Recommendation:        - Discharge patient to home.                          - Resume previous diet.                          - Continue present medications.                          - Await pathology results.   Jose Bruno MD   1/9/2025 9:51:04 AM       Assessment:  1. Change in bowel habits    2. Diarrhea, unspecified type    3. Nausea      Orders Placed This Encounter    cholestyramine, with sugar, 4 gram Powd    dicyclomine (BENTYL) 10 MG capsule       Assessment & Plan      FUNCTIONAL INTESTINAL DISORDERS:  - Considered colonoscopy due to change in bowel habits and persistent diarrhea since February, despite negative stool studies.  - Started Questran (bile acid sequestrant) daily for diarrhea management.  - Started Bentyl (antispasmodic) as needed before meals or social events for gut motility control, especially before social events.  - Advised fiber supplementation to improve stool consistency and bulk stools, reducing frequency of  bowel movements.  - Recommend OTC fiber supplement (e.g., Metamucil, Benefiber, or Citrucel) daily.  - Patient to increase dietary fiber intake.  - Ordered colonoscopy.  - Noted previous upper endoscopy in January with negative results for lactose intolerance.  - Clarified the difference between lactose intolerance and dairy allergy/intolerance.  - Consider use of Lactaid pills or Lactaid milk if continuing to use dairy products.  - Explained colonoscopy procedure, including need for anesthesia and a designated .    FOLLOW-UP:  - Follow up when colonoscopy appointment is scheduled.  - Contact office via patient portal if any additional questions arise.          PEYTON COBOS-C  Gastroenterology Department  [facility] Office [phone]     This note was generated with the assistance of ambient listening technology. Verbal consent was obtained by the patient and accompanying visitor(s) for the recording of patient appointment to facilitate this note. I attest to having reviewed and edited the generated note for accuracy, though some syntax or spelling errors may persist. Please contact the author of this note for any clarification.                      [1]   Current Outpatient Medications on File Prior to Visit   Medication Sig Dispense Refill    alendronate (FOSAMAX) 70 MG tablet Take 1 tablet (70 mg total) by mouth every 7 days. 12 tablet 3    calcium-vitamin D 600 mg-10 mcg (400 unit) Tab TAKE 1 TABLET BY MOUTH TWICE A  tablet 0    diclofenac sodium (VOLTAREN) 1 % Gel Apply 2 g topically once daily. 150 g 5    gabapentin (NEURONTIN) 300 MG capsule TAKE 1 CAPSULE BY MOUTH EVERY DAY AT NIGHT 30 capsule 5    lansoprazole (PREVACID) 30 MG capsule Take 1 capsule (30 mg total) by mouth once daily. 90 capsule 3    sertraline (ZOLOFT) 100 MG tablet Take 1 tablet (100 mg total) by mouth once daily. 90 tablet 3    tacrolimus (PROTOPIC) 0.1 % ointment Apply topically 2 (two)  times daily.      thyroid, pork, (ARMOUR THYROID) 60 mg Tab Take 1 tablet (60 mg total) by mouth before breakfast. 90 tablet 3    valACYclovir (VALTREX) 1000 MG tablet Take 2,000 mg by mouth daily as needed (with outbreak).       No current facility-administered medications on file prior to visit.

## 2025-06-09 ENCOUNTER — TELEPHONE (OUTPATIENT)
Dept: ENDOSCOPY | Facility: HOSPITAL | Age: 70
End: 2025-06-09
Payer: MEDICARE

## 2025-06-09 NOTE — TELEPHONE ENCOUNTER
"Attempted to contact patient to schedule colonoscopy. Left voicemail message to call Endoscopy Scheduling at # [phone] to schedule procedure.        From: Sommer Cantu FNP-C   Sent: 6/5/2025   2:24 PM CDT   To: [facility]   Subject: colonoscopy                                      Procedure: Colonoscopy     Diagnosis: Diarrhea     Procedure Timing: Within 12 weeks     *If within 4 weeks selected, please tiffanie as high priority*     *If greater than 12 weeks, please select "5-12 weeks" and delay sending until 3 months prior to requested date*     Location: Any Site     Additional Scheduling Information: No scheduling concerns     Prep Specifications:Standard prep     Is the patient taking a GLP-1 Agonist:no     Have you attached a patient to this message: yes   "

## 2025-06-10 ENCOUNTER — TELEPHONE (OUTPATIENT)
Dept: PRIMARY CARE CLINIC | Facility: CLINIC | Age: 70
End: 2025-06-10
Payer: MEDICARE

## 2025-06-10 NOTE — TELEPHONE ENCOUNTER
Copied from CRM #[id number]. Topic: General Inquiry - Patient Advice  >> Gallito 10, 2025 11:31 AM Luz Maria wrote:  .1MEDICALADVICE     Patient is calling for Medical Advice regarding: Calling again to request the Physical Therapy referral faxed to [facility], fax [phone].  Please call her.thanks.    How long has patient had these symptoms: N/A    Pharmacy name and phone#: N/A    Patient wants a call back or thru [facility]: N/A    Comments: N/A    Please advise patient replies from provider may take up to 48 hours.

## 2025-06-11 ENCOUNTER — PATIENT MESSAGE (OUTPATIENT)
Dept: ENDOSCOPY | Facility: HOSPITAL | Age: 70
End: 2025-06-11
Payer: MEDICARE

## 2025-06-11 ENCOUNTER — TELEPHONE (OUTPATIENT)
Dept: PRIMARY CARE CLINIC | Facility: CLINIC | Age: 70
End: 2025-06-11
Payer: MEDICARE

## 2025-06-11 ENCOUNTER — PATIENT MESSAGE (OUTPATIENT)
Dept: PRIMARY CARE CLINIC | Facility: CLINIC | Age: 70
End: 2025-06-11
Payer: MEDICARE

## 2025-06-11 ENCOUNTER — TELEPHONE (OUTPATIENT)
Dept: ENDOSCOPY | Facility: HOSPITAL | Age: 70
End: 2025-06-11
Payer: MEDICARE

## 2025-06-11 DIAGNOSIS — Z12.11 SPECIAL SCREENING FOR MALIGNANT NEOPLASMS, COLON: Primary | ICD-10-CM

## 2025-06-11 DIAGNOSIS — R19.7 DIARRHEA, UNSPECIFIED TYPE: ICD-10-CM

## 2025-06-11 RX ORDER — SODIUM, POTASSIUM,MAG SULFATES 17.5-3.13G
1 SOLUTION, RECONSTITUTED, ORAL ORAL DAILY
Qty: 1 KIT | Refills: 0 | Status: SHIPPED | OUTPATIENT
Start: 2025-06-11 | End: 2025-06-13

## 2025-06-11 NOTE — TELEPHONE ENCOUNTER
"Patient is scheduled for a Colonoscopy on 6/27/25 with Dr. ZO Mancilla  Referral for procedure from [facility]               From: Sommer Cantu FNP-C   Sent: 6/5/2025   2:24 PM CDT   To: [facility]   Subject: colonoscopy                                      Procedure: Colonoscopy     Diagnosis: Diarrhea     Procedure Timing: Within 12 weeks     *If within 4 weeks selected, please tiffanie as high priority*     *If greater than 12 weeks, please select "5-12 weeks" and delay sending until 3 months prior to requested date*     Location: Any Site     Additional Scheduling Information: No scheduling concerns     Prep Specifications:Standard prep     Is the patient taking a GLP-1 Agonist:no     Have you attached a patient to this message: yes             "

## 2025-06-16 ENCOUNTER — TELEPHONE (OUTPATIENT)
Dept: PSYCHOLOGY | Facility: CLINIC | Age: 70
End: 2025-06-16
Payer: MEDICARE

## 2025-06-17 ENCOUNTER — OFFICE VISIT (OUTPATIENT)
Dept: PSYCHOLOGY | Facility: CLINIC | Age: 70
End: 2025-06-17
Payer: MEDICARE

## 2025-06-17 DIAGNOSIS — F41.1 GENERALIZED ANXIETY DISORDER: Primary | ICD-10-CM

## 2025-06-17 DIAGNOSIS — F32.A DEPRESSION, UNSPECIFIED DEPRESSION TYPE: ICD-10-CM

## 2025-06-17 PROCEDURE — 90834 PSYTX W PT 45 MINUTES: CPT | Mod: HCNC,S$GLB,, | Performed by: PSYCHOLOGIST
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
## 2025-06-20 DIAGNOSIS — M81.0 AGE-RELATED OSTEOPOROSIS WITHOUT CURRENT PATHOLOGICAL FRACTURE: ICD-10-CM

## 2025-06-20 NOTE — TELEPHONE ENCOUNTER
Copied from CRM #[id number]. Topic: Medications - Medication Refill  >> Jun 20, 2025  4:28 PM Luz Maria wrote:  Requesting an RX refill or new RX.    Is this a refill or new RX: Refill    RX name and strength (copy/paste from chart):  calcium-vitamin D 600 mg-10 mcg (400 unit) Tab    Is this a 30 day or 90 day RX: 90    Pharmacy name and phone # (copy/paste from chart):    [facility]/pharmacy #[id number] - [place], [place] - [address] [facility]  [address] [facility] [address]  Phone: [phone] Fax: [phone]       Who called and call back number: Patient, [phone]    The doctors have asked that we provide their patients with the following 2 reminders -- prescription refills can take up to 72 hours, and a friendly reminder that in the future you can use your [facility] account to request refills: Yes

## 2025-06-23 ENCOUNTER — TELEPHONE (OUTPATIENT)
Dept: ENDOSCOPY | Facility: HOSPITAL | Age: 70
End: 2025-06-23
Payer: MEDICARE

## 2025-06-23 RX ORDER — MULTIVITAMIN
1 TABLET ORAL 2 TIMES DAILY
Qty: 180 TABLET | Refills: 1 | Status: SHIPPED | OUTPATIENT
Start: 2025-06-23

## 2025-06-27 ENCOUNTER — ANESTHESIA (OUTPATIENT)
Dept: ENDOSCOPY | Facility: HOSPITAL | Age: 70
End: 2025-06-27
Payer: MEDICARE

## 2025-06-27 ENCOUNTER — HOSPITAL ENCOUNTER (OUTPATIENT)
Facility: HOSPITAL | Age: 70
Discharge: HOME | End: 2025-06-27
Attending: INTERNAL MEDICINE | Admitting: INTERNAL MEDICINE
Payer: MEDICARE

## 2025-06-27 ENCOUNTER — ANESTHESIA EVENT (OUTPATIENT)
Dept: ENDOSCOPY | Facility: HOSPITAL | Age: 70
End: 2025-06-27
Payer: MEDICARE

## 2025-06-27 VITALS
WEIGHT: 117.94 LBS | DIASTOLIC BLOOD PRESSURE: 58 MMHG | OXYGEN SATURATION: 100 % | RESPIRATION RATE: 16 BRPM | HEART RATE: 65 BPM | HEIGHT: 64 IN | TEMPERATURE: 98 F | SYSTOLIC BLOOD PRESSURE: 112 MMHG | BODY MASS INDEX: 20.14 KG/M2

## 2025-06-27 DIAGNOSIS — R19.7 DIARRHEA: ICD-10-CM

## 2025-06-27 DIAGNOSIS — R19.7 DIARRHEA, UNSPECIFIED TYPE: ICD-10-CM

## 2025-06-27 PROCEDURE — 88305 TISSUE EXAM BY PATHOLOGIST: CPT | Mod: TC,91,HCNC | Performed by: INTERNAL MEDICINE

## 2025-06-27 PROCEDURE — 45380 COLONOSCOPY AND BIOPSY: CPT | Mod: HCNC | Performed by: INTERNAL MEDICINE

## 2025-06-27 PROCEDURE — 63600175 PHARM REV CODE 636 W HCPCS: Mod: HCNC

## 2025-06-27 PROCEDURE — 25000003 PHARM REV CODE 250: Mod: HCNC | Performed by: INTERNAL MEDICINE

## 2025-06-27 PROCEDURE — 37000009 HC ANESTHESIA EA ADD 15 MINS: Mod: HCNC | Performed by: INTERNAL MEDICINE

## 2025-06-27 PROCEDURE — 45380 COLONOSCOPY AND BIOPSY: CPT | Mod: HCNC,,, | Performed by: INTERNAL MEDICINE

## 2025-06-27 PROCEDURE — 27201012 HC FORCEPS, HOT/COLD, DISP: Mod: HCNC | Performed by: INTERNAL MEDICINE

## 2025-06-27 PROCEDURE — 37000008 HC ANESTHESIA 1ST 15 MINUTES: Mod: HCNC | Performed by: INTERNAL MEDICINE

## 2025-06-27 RX ORDER — LIDOCAINE HYDROCHLORIDE 20 MG/ML
INJECTION INTRAVENOUS
Status: DISCONTINUED | OUTPATIENT
Start: 2025-06-27 | End: 2025-06-27

## 2025-06-27 RX ORDER — PROPOFOL 10 MG/ML
VIAL (ML) INTRAVENOUS
Status: DISCONTINUED | OUTPATIENT
Start: 2025-06-27 | End: 2025-06-27

## 2025-06-27 RX ORDER — PHENYLEPHRINE HYDROCHLORIDE 10 MG/ML
INJECTION INTRAVENOUS
Status: DISCONTINUED | OUTPATIENT
Start: 2025-06-27 | End: 2025-06-27

## 2025-06-27 RX ORDER — SODIUM CHLORIDE 9 MG/ML
INJECTION, SOLUTION INTRAVENOUS CONTINUOUS
Status: DISCONTINUED | OUTPATIENT
Start: 2025-06-27 | End: 2025-06-27 | Stop reason: HOSPADM

## 2025-06-27 RX ADMIN — SODIUM CHLORIDE: 0.9 INJECTION, SOLUTION INTRAVENOUS at 08:06

## 2025-06-27 RX ADMIN — PROPOFOL 20 MG: 10 INJECTION, EMULSION INTRAVENOUS at 08:06

## 2025-06-27 RX ADMIN — PROPOFOL 60 MG: 10 INJECTION, EMULSION INTRAVENOUS at 08:06

## 2025-06-27 RX ADMIN — PROPOFOL 150 MCG/KG/MIN: 10 INJECTION, EMULSION INTRAVENOUS at 08:06

## 2025-06-27 RX ADMIN — PHENYLEPHRINE HYDROCHLORIDE 100 MCG: 10 INJECTION INTRAVENOUS at 08:06

## 2025-06-27 RX ADMIN — LIDOCAINE HYDROCHLORIDE 50 MG: 20 INJECTION INTRAVENOUS at 08:06

## 2025-06-27 NOTE — PROVATION PATIENT INSTRUCTIONS
Discharge Summary/Instructions after an Endoscopic Procedure  Patient Name: Kalina Ivan  Patient MRN: [id number]  Patient YOB: 1955 Friday, June 27, 2025  Aelx Mancilla MD  Dear patient,  As a result of recent federal legislation (The Federal Cures Act), you may   receive lab or pathology results from your procedure in your [facility]   account before your physician is able to contact you. Your physician or   their representative will relay the results to you with their   recommendations at their soonest availability.  Thank you,  RESTRICTIONS:  During your procedure today, you received medications for sedation.  These   medications may affect your judgment, balance and coordination.  Therefore,   for 24 hours, you have the following restrictions:   - DO NOT drive a car, operate machinery, make legal/financial decisions,   sign important papers or drink alcohol.    ACTIVITY:  Today: no heavy lifting, straining or running due to procedural   sedation/anesthesia.  The following day: return to full activity including work.  DIET:  Eat and drink normally unless instructed otherwise.     TREATMENT FOR COMMON SIDE EFFECTS:  - Mild abdominal pain, nausea, belching, bloating or excessive gas:  rest,   eat lightly and use a heating pad.  - Sore Throat: treat with throat lozenges and/or gargle with warm salt   water.  - Because air was used during the procedure, expelling large amounts of air   from your rectum or belching is normal.  - If a bowel prep was taken, you may not have a bowel movement for 1-3 days.    This is normal.  SYMPTOMS TO WATCH FOR AND REPORT TO YOUR PHYSICIAN:  1. Abdominal pain or bloating, other than gas cramps.  2. Chest pain.  3. Back pain.  4. Signs of infection such as: chills or fever occurring within 24 hours   after the procedure.  5. Rectal bleeding, which would show as bright red, maroon, or black stools.   (A tablespoon of blood from the rectum is not serious, especially if    hemorrhoids are present.)  6. Vomiting.  7. Weakness or dizziness.  GO DIRECTLY TO THE NEAREST EMERGENCY ROOM IF YOU HAVE ANY OF THE FOLLOWING:      Difficulty breathing              Chills and/or fever over 101 F   Persistent vomiting and/or vomiting blood   Severe abdominal pain   Severe chest pain   Black, tarry stools   Bleeding- more than one tablespoon   Any other symptom or condition that you feel may need urgent attention  Your doctor recommends these additional instructions:  If any biopsies were taken, your doctors clinic will contact you in 1 to 2   weeks with any results.  - Discharge patient to home.   - Resume previous diet today.   - Continue present medications.   - Await pathology results.   - Repeat colonoscopy in 10 years for screening purposes.   - Return to referring provider as previously scheduled.   - Patient has a contact number available for emergencies.  The signs and   symptoms of potential delayed complications were discussed with the   patient.  Return to normal activities tomorrow.  Written discharge   instructions were provided to the patient.  For questions, problems or results please call your physician - Alex Mancilla MD at Work:  [phone].  [facility], EMERGENCY ROOM PHONE NUMBER: [phone]  IF A COMPLICATION OR EMERGENCY SITUATION ARISES AND YOU ARE UNABLE TO REACH   YOUR PHYSICIAN - GO DIRECTLY TO THE EMERGENCY ROOM.  Alex Mancilla MD  6/27/2025 8:49:40 AM  This report has been verified and signed electronically.  Dear patient,  As a result of recent federal legislation (The Federal Cures Act), you may   receive lab or pathology results from your procedure in your [facility]   account before your physician is able to contact you. Your physician or   their representative will relay the results to you with their   recommendations at their soonest availability.  Thank you,  PROVATION

## 2025-06-27 NOTE — TRANSFER OF CARE
"Anesthesia Transfer of Care Note    Patient: Kalina Ivan    Procedure(s) Performed: Procedure(s) (LRB):  COLONOSCOPY (N/A)    Patient location: GI    Anesthesia Type: general    Transport from OR: Transported from OR on room air with adequate spontaneous ventilation    Post pain: adequate analgesia    Post assessment: no apparent anesthetic complications    Post vital signs: stable    Level of consciousness: awake    Nausea/Vomiting: no nausea/vomiting    Complications: none    Transfer of care protocol was followed      Last vitals: Visit Vitals  BP (!) 115/53 (BP Location: Left arm, Patient Position: Lying)   Pulse 65   Temp 36.6 °C (97.9 °F) (Tympanic)   Resp 16   Ht 5' 4" (1.626 m)   Wt 53.5 kg (117 lb 15.1 oz)   SpO2 100%   Breastfeeding No   BMI 20.25 kg/m²     "

## 2025-06-27 NOTE — ANESTHESIA POSTPROCEDURE EVALUATION
Anesthesia Post Evaluation    Patient: Kalina Ivan    Procedure(s) Performed: Procedure(s) (LRB):  COLONOSCOPY (N/A)    Final Anesthesia Type: general      Patient location during evaluation: GI PACU  Patient participation: Yes- Able to Participate  Level of consciousness: awake and alert  Post-procedure vital signs: reviewed and stable  Pain management: adequate  Airway patency: patent    PONV status at discharge: No PONV  Anesthetic complications: no      Cardiovascular status: hemodynamically stable  Respiratory status: unassisted, spontaneous ventilation and room air  Hydration status: euvolemic  Follow-up not needed.          Vitals Value Taken Time   /58 06/27/25 09:25   Temp 36.6 °C (97.9 °F) 06/27/25 08:53   Pulse 65 06/27/25 09:25   Resp 16 06/27/25 09:25   SpO2 100 % 06/27/25 09:25         Event Time   Out of Recovery 09:30:00         Pain/Marisol Score: Marisol Score: 10 (6/27/2025  9:25 AM)

## 2025-06-27 NOTE — ANESTHESIA PREPROCEDURE EVALUATION
06/27/2025  Kailna Ivan is a 70 y.o., female.  Past Medical History:   Diagnosis Date    Hypothyroidism     Osteoporosis      Past Surgical History:   Procedure Laterality Date    EGD, WITH CLOSED BIOPSY  01/09/2025    ESOPHAGOGASTRODUODENOSCOPY N/A 1/9/2025    Procedure: EGD (ESOPHAGOGASTRODUODENOSCOPY);  Surgeon: Jose Bruno MD;  Location: [facility];  Service: Endoscopy;  Laterality: N/A;    FRACTURE SURGERY      OPEN REDUCTION AND INTERNAL FIXATION (ORIF) OF INJURY OF ANKLE Right 03/27/2023    Procedure: ORIF, ANKLE;  Surgeon: Isrrael Pereira MD;  Location: [facility] OR;  Service: Orthopedics;  Laterality: Right;    ORIF, ANKLE - Right Right 03/27/2023         Pre-op Assessment    I have reviewed the Patient Summary Reports.    I have reviewed the NPO Status.   I have reviewed the Medications.     Review of Systems  Anesthesia Hx:  No problems with previous Anesthesia   History of prior surgery of interest to airway management or planning:          Denies Family Hx of Anesthesia complications.    Denies Personal Hx of Anesthesia complications.                    Social:  Non-Smoker       Cardiovascular:  Cardiovascular Normal Exercise tolerance: good                                             Pulmonary:  Pulmonary Normal                       Renal/:  Renal/ Normal                 Hepatic/GI:  Bowel Prep.   GERD, well controlled                Neurological:  Neurology Normal                                      Endocrine:   Hypothyroidism              Physical Exam  General: Well nourished    Airway:  Mallampati: I   Mouth Opening: Normal  TM Distance: Normal  Tongue: Normal    Dental:  Intact    Chest/Lungs:  Normal Respiratory Rate    Heart:  Rate: Normal    Anesthesia Plan  Type of Anesthesia, risks & benefits discussed:    Anesthesia Type: Gen Natural Airway  Intra-op  Monitoring Plan: Standard ASA Monitors  Induction:  IV  Informed Consent: Informed consent signed with the Patient and all parties understand the risks and agree with anesthesia plan.  All questions answered.   ASA Score: 2  Day of Surgery Review of History & Physical: H&P Update referred to the surgeon/provider.    Ready For Surgery From Anesthesia Perspective.   .

## 2025-06-30 ENCOUNTER — TELEPHONE (OUTPATIENT)
Dept: PSYCHOLOGY | Facility: CLINIC | Age: 70
End: 2025-06-30
Payer: MEDICARE

## 2025-07-01 ENCOUNTER — PATIENT MESSAGE (OUTPATIENT)
Dept: PSYCHOLOGY | Facility: CLINIC | Age: 70
End: 2025-07-01
Payer: MEDICARE

## 2025-07-01 ENCOUNTER — TELEPHONE (OUTPATIENT)
Dept: PSYCHOLOGY | Facility: CLINIC | Age: 70
End: 2025-07-01
Payer: MEDICARE

## 2025-07-01 LAB
ESTROGEN SERPL-MCNC: NORMAL PG/ML
INSULIN SERPL-ACNC: NORMAL U[IU]/ML
LAB AP CLINICAL INFORMATION: NORMAL
LAB AP GROSS DESCRIPTION: NORMAL
LAB AP PERFORMING LOCATION(S): NORMAL
LAB AP REPORT FOOTNOTES: NORMAL

## 2025-07-01 NOTE — PROGRESS NOTES
"Outpatient Psychology Follow-up    Date of Service: 2025    Time: 3:40 PM  Name: Kalina Ivan  Age: 70 y.o.  SEX@    : 1955                Visit Length: 45 Minutes  The patient location is: [place]  Treatment Modality/Intervention: Individual Psychotherapy      Visit type: audiovisual    Face to Face time with patient: 45 minutes  60 minutes of total time spent on the encounter, which includes face to face time and non-face to face time preparing to see the patient (eg, review of tests), Obtaining and/or reviewing separately obtained history, Documenting clinical information in the electronic or other health record, Independently interpreting results (not separately reported) and communicating results to the patient/family/caregiver, or Care coordination (not separately reported).         Each patient to whom he or she provides medical services by telemedicine is:  (1) informed of the relationship between the physician and patient and the respective role of any other health care provider with respect to management of the patient; and (2) notified that he or she may decline to receive medical services by telemedicine and may withdraw from such care at any time.      Chief Complaint:   The chief complaint leading to consultation is: depression and anxiety    History of Present Illness    CHIEF COMPLAINT:  Patient presents with anxiety and depression, reporting physical discomfort and emotional distress following a recent colonoscopy.    HPI:  Patient underwent a colonoscopy on Friday, experiencing significant discomfort during the preparation process. She immediately began experiencing persistent diarrhea with five episodes daily, feeling physically terrible and nauseous. No abnormalities were found during the colonoscopy, except for a slight abnormality at the junction of the large and small intestine, which was sent for testing.    Patient reports feeling anxious and depressed, stating she's "not " "feeling good at all." She mentions the sertraline she's taking is not working, as she continues to feel anxious and depressed. Patient expresses low energy and motivation, feeling incapable of functioning normally. She describes herself as feeling like a "blob" with nothing to look forward to, causing significant distress. Patient denied suicidal ideation/plan.    Patient mentions ongoing neck pain causing headaches. She has been attending physical therapy for this issue but recently learned that due to Medicaid restrictions, she can only have 12 sessions per year, of which she has already used six. This limitation is particularly distressing as she needs shoulder surgery planned for September or October.    Patient is currently staying at a friend's house but feels uncomfortable and alienated from her life. Despite the luxurious surroundings, including a pool, she feels out of place and misses her own home and belongings. This situation is exacerbating her feelings of depression and anxiety. She expresses guilt about not enjoying the opportunity and feeling unable to socialize or entertain as she used to.    Patient reports significant financial stress. Her car has broken down twice in the past ten days, costing $1000 to fix. She mentions being "really broke" and running out of clients, which is adding to her anxiety. There's also uncertainty about her Medicaid status, causing worry about potential copays for medical services.    Patient denies noticing any difference when previously eliminating dairy from her diet. She also denies having a formal diagnosis of Crohn's disease.    LIFESTYLE:  Patient previously worked as a . She currently has some clients at work, though she mentions running out of clients recently. She is staying at a friend's large house while they are away, expressing feelings of discomfort and alienation in this environment despite her friends' kindness in offering the space. " Patient mentions having her own house, which she has briefly visited during her stay at the friend's place. She enjoys cooking and hosting dinner parties, and previously enjoyed travel, including trips to [place] to visit family and to [place] to see friends. Patient describes a history of active social engagement, including hosting large parties and dinner gatherings. However, she currently feels low in energy and motivation for social activities. She expresses a desire to re-engage socially but struggles with the energy to do so. Patient also indicates a past history of traveling to visit friends and family, which she is currently unable to do due to financial constraints.    FAMILY HISTORY:  Family history is significant for mother who moved into assisted living at age 92, gave up playing bridge, and went into decline.    MEDICATIONS:  Patient is on Sertraline for anxiety and depression, which is not working. She is also on Alendronate for bones.            Patient's response to intervention:  The patient's response to intervention is accepting, motivated  Progress toward goals and other mental status changes:  The patient's progress toward goals is fair .        Verbal deficits: None        Psychological Problem List: Problem List[1]        Psychiatric    Physical Exam    Appearance: Appears stated age. Well-groomed. Well-nourished.  Speech: Normal rate. Normal volume. Spontaneous and fluid.  Affect: Restricted affect.  Thought Content: No evidence of aggression. No evidence of homicidal ideation. No evidence of homicidal plan. No evidence of homicidal intent. No evidence of suicidal ideation. No evidence of suicidal plan. No evidence of suicidal intent. No evidence of delusions.  Memory: Recent memory intact. Remote memory intact.  Behavior: Cooperative. Good eye contact. Engaged. Pleasant.  Mood: Dysphoric.  Thought Form: Linear thinking. Not goal oriented.  Perception: No perceptual abnormalities  "noted.  Judgement: no evidence of poor decision making.  Insight: Good insight into symptoms. Good insight into treatment options.  Cognition: A&Ox3. Unable to maintain focus for extended periods of time. Average fund of knowledge.  Motor: No gross motor abnormalities.                   Risk parameters:  Patient reports no suicidal ideation  Patient reports no homicidal ideation  Patient reports no self-injurious behavior  Patient reports no violent behavior  Patient has been given the Suicide crisis line phone number.      Plan:  individual psychotherapy      Diagnosis:    ICD-10-CM ICD-9-CM   1. Generalized anxiety disorder  F41.1 300.02   2. Depression, unspecified depression type  F32.A 311       Assessment & Plan    IMPRESSION:   Discussed ongoing GI symptoms following recent colonoscopy.   Patient discussed effectiveness of current sertraline treatment for anxiety and depression.     Set small, achievable goals and acknowledge accomplishments   Schedule short social engagements   Practice self-monitoring of negative thoughts and implement positive self-talk   Invite a friend for assistance with small decluttering tasks at home   Engage in visualization exercises      Patient to practice self-monitoring of negative thoughts and implement positive self-talk.   Patient to engage in visualization exercises to imagine "re-stuffing" oneself with positive energy.   Patient to set small, achievable goals and acknowledge accomplishments.     Patient to schedule short social engagements, such as inviting someone over for coffee or tea.   Patient to invite a friend to help with or be present during small decluttering tasks at home.         Target symptoms: depression, anxiety   Why chosen therapy is appropriate versus another modality: relevant to diagnosis, patient responds to this modality, evidence based practice  Outcome monitoring methods: self-report, observation  Therapeutic intervention type: insight oriented " psychotherapy, behavior modifying psychotherapy   Topics discussed/themes: building skills sets for symptom management, symptom recognition   The patient's response to the intervention is accepting, motivated.    The patient's progress toward treatment goals is fair.   Encouraged patient to keep future appointments.  Instruct patient to call or message with questions.  In the event of an emergency, including suicidal ideation, plan or intent or homicidal ideation, plan or intent, patient advised to call [phone] or present at the nearest ED.       Treatment Goals:    Reduce overall frequency, intensity, and duration of the anxiety so that daily functioning is not impaired.  Stabilize anxiety level while increasing ability to function on a daily basis.  Resolve the core conflict that is the source of anxiety.  Enhance ability to effectively cope with the full variety of life's worries and anxieties.  Alleviate depressive symptoms and return to previous level of effective functioning.       Follow-up Plan:     individual psychotherapy      Return to clinic: No follow-ups on file.    This note was generated with the assistance of ambient listening technology. Verbal consent was obtained by the patient and accompanying visitor(s) for the recording of patient appointment to facilitate this note. I attest to having reviewed and edited the generated note for accuracy, though some syntax or spelling errors may persist. Please contact the author of this note for any clarification.                                         [1]   Patient Active Problem List  Diagnosis    Malleolar fracture, right, closed, initial encounter    Closed fracture of head of right radius    Hypothyroidism    Osteoporosis

## 2025-07-02 ENCOUNTER — TELEPHONE (OUTPATIENT)
Dept: PRIMARY CARE CLINIC | Facility: CLINIC | Age: 70
End: 2025-07-02

## 2025-07-02 ENCOUNTER — TELEPHONE (OUTPATIENT)
Dept: GASTROENTEROLOGY | Facility: CLINIC | Age: 70
End: 2025-07-02
Payer: MEDICARE

## 2025-07-02 ENCOUNTER — OFFICE VISIT (OUTPATIENT)
Dept: PSYCHOLOGY | Facility: CLINIC | Age: 70
End: 2025-07-02
Payer: MEDICARE

## 2025-07-02 ENCOUNTER — OFFICE VISIT (OUTPATIENT)
Dept: PRIMARY CARE CLINIC | Facility: CLINIC | Age: 70
End: 2025-07-02
Payer: MEDICARE

## 2025-07-02 ENCOUNTER — RESULTS FOLLOW-UP (OUTPATIENT)
Dept: GASTROENTEROLOGY | Facility: HOSPITAL | Age: 70
End: 2025-07-02

## 2025-07-02 VITALS
SYSTOLIC BLOOD PRESSURE: 114 MMHG | RESPIRATION RATE: 18 BRPM | WEIGHT: 121.81 LBS | HEART RATE: 80 BPM | OXYGEN SATURATION: 98 % | HEIGHT: 64 IN | BODY MASS INDEX: 20.79 KG/M2 | DIASTOLIC BLOOD PRESSURE: 70 MMHG

## 2025-07-02 DIAGNOSIS — B00.9 HSV (HERPES SIMPLEX VIRUS) INFECTION: ICD-10-CM

## 2025-07-02 DIAGNOSIS — F32.0 CURRENT MILD EPISODE OF MAJOR DEPRESSIVE DISORDER WITHOUT PRIOR EPISODE: Primary | ICD-10-CM

## 2025-07-02 DIAGNOSIS — F41.1 GENERALIZED ANXIETY DISORDER: Primary | ICD-10-CM

## 2025-07-02 DIAGNOSIS — F32.A DEPRESSION, UNSPECIFIED DEPRESSION TYPE: ICD-10-CM

## 2025-07-02 DIAGNOSIS — R09.A2 GLOBUS SENSATION: ICD-10-CM

## 2025-07-02 DIAGNOSIS — K52.9 CHRONIC DIARRHEA: ICD-10-CM

## 2025-07-02 PROCEDURE — 99999 PR PBB SHADOW E&M-EST. PATIENT-LVL III: CPT | Mod: PBBFAC,HCNC,, | Performed by: STUDENT IN AN ORGANIZED HEALTH CARE EDUCATION/TRAINING PROGRAM

## 2025-07-02 RX ORDER — VALACYCLOVIR HYDROCHLORIDE 1 G/1
TABLET, FILM COATED ORAL
Qty: 12 TABLET | Refills: 5 | Status: SHIPPED | OUTPATIENT
Start: 2025-07-02

## 2025-07-02 RX ORDER — ESCITALOPRAM OXALATE 20 MG/1
20 TABLET ORAL DAILY
Qty: 90 TABLET | Refills: 3 | Status: SHIPPED | OUTPATIENT
Start: 2025-07-02 | End: 2026-07-02

## 2025-07-02 NOTE — TELEPHONE ENCOUNTER
Spoke with patient and she stated her right shoulder mri was done at [facility], Dr. Claude Williams

## 2025-07-02 NOTE — PATIENT INSTRUCTIONS
For next 2 weeks take 1/2 tablet (50 mg) sertraline and 1/2 tablet (10 mg) Lexapro for first 2 weeks of transition. Take 1 tablet (20 mg) lexapro thereafter.

## 2025-07-02 NOTE — PROGRESS NOTES
"Subjective:       Patient ID: Kalina Ivan is a 70 y.o. female.    Chief Complaint: Follow-up      HPI:  70 y.o. female presents to [facility] with multiple concerns.    Patient reports feels exhausted all the time. Feels began around same timeline of intestinal concerns. No Hx of abdominal surgery.    Sertraline was reported ineffective most recent visit with Psychology (today).  Felt initially like it was, but no longer feels improvement.  Would like to attempt an alternate?: Yes  Adverse effects?: Was tired while tired    Was in PT for neck and shoulder and insurance isn't going to cover more than 6 more PT sessions. Waiting for MRI of neck. Has been in PT for 4 weeks. Uncertain if MRI shoulder was done, but believes was.    Patient with ongoing GI concerns and following with Gastroenterology. Last saw 6/5/2025 with symptoms of frequent diarrhea. Had a colonoscopy 6/27/2025.    Review of Systems   Constitutional:  Positive for fatigue and unexpected weight change (Feels had lost, but maube due to trip in [place] with lost of running. Has regained.). Negative for appetite change.   HENT:  Negative for congestion, sinus pressure, sneezing and sore throat.         Globus sensation at times in throat. Feels has to clear, not associated with swallowing, can wake her   Respiratory:  Negative for chest tightness and shortness of breath.    Cardiovascular:  Negative for chest pain and palpitations.   Gastrointestinal:  Positive for diarrhea (SOmetimes mucousy, sometimes normal). Negative for abdominal pain, nausea and vomiting.   Skin:  Negative for rash and wound.   Psychiatric/Behavioral:  Positive for dysphoric mood. Negative for suicidal ideas. The patient is nervous/anxious.        Objective:      Vitals:    07/02/25 1433   BP: 114/70   BP Location: Right arm   Patient Position: Sitting   Pulse: 80   Resp: 18   SpO2: 98%   Weight: 55.3 kg (121 lb 12.9 oz)   Height: 5' 4" (1.626 m)     Physical Exam  Vitals " "reviewed.   Constitutional:       General: She is not in acute distress.     Appearance: Normal appearance. She is not ill-appearing.   HENT:      Head: Normocephalic and atraumatic.   Eyes:      General:         Right eye: No discharge.         Left eye: No discharge.      Conjunctiva/sclera: Conjunctivae normal.   Cardiovascular:      Rate and Rhythm: Normal rate.   Pulmonary:      Effort: Pulmonary effort is normal.   Musculoskeletal:         General: No deformity.   Skin:     Coloration: Skin is not jaundiced or pale.   Neurological:      General: No focal deficit present.      Mental Status: She is alert and oriented to person, place, and time.   Psychiatric:         Mood and Affect: Mood normal.         Behavior: Behavior normal.             Lab Results   Component Value Date     03/07/2025    K 4.1 03/07/2025     03/07/2025    CO2 27 03/07/2025    BUN 15 03/07/2025    CREATININE 0.7 03/07/2025    ANIONGAP 9 03/07/2025     Lab Results   Component Value Date    HGBA1C 5.4 08/15/2022     No results found for: "BNP", "BNPTRIAGEBLO"    Lab Results   Component Value Date    WBC 6.21 03/07/2025    HGB 13.7 03/07/2025    HCT 42.1 03/07/2025     03/07/2025    GRAN 3.2 03/07/2025    GRAN 51.9 03/07/2025     Lab Results   Component Value Date    CHOL 236 (H) 03/07/2025    HDL 68 03/07/2025    LDLCALC 139.0 03/07/2025    TRIG 145 03/07/2025        Current Medications[1]        Assessment:       1. Current mild episode of major depressive disorder without prior episode    2. Globus sensation    3. Chronic diarrhea    4. HSV (herpes simplex virus) infection           Plan:       Current mild episode of major depressive disorder without prior episode  -     EScitalopram oxalate (LEXAPRO) 20 MG tablet; Take 1 tablet (20 mg total) by mouth once daily. Take 1/2 tablet (10 mg) first 14 days when starting medication, then one tablet (20 mg) daily thereafter  Dispense: 90 tablet; Refill: 3    Globus " sensation  - Encouraged patient to scuss with GI when next seen. Possible benefit from ENT referral    Chronic diarrhea  - No known causation to date. If IBS related, may respond to Lexapro  - Will message scheduling to arrange follow-up in [place] with MD/    HSV (herpes simplex virus) infection  -     valACYclovir (VALTREX) 1000 MG tablet; Take 2 tablets (2,000 mg) followed by 2 tablets (2,000 mg) 12 hours later at first sign of outbreak. Each Rx contains enough medication to treat 3 outbreaks.  Dispense: 12 tablet; Refill: 5    RTC in 4 weeks         [1]   Current Outpatient Medications:     alendronate (FOSAMAX) 70 MG tablet, Take 1 tablet (70 mg total) by mouth every 7 days., Disp: 12 tablet, Rfl: 3    calcium-vitamin D 600 mg-10 mcg (400 unit) Tab, Take 1 tablet by mouth 2 (two) times daily., Disp: 180 tablet, Rfl: 1    diclofenac sodium (VOLTAREN) 1 % Gel, Apply 2 g topically once daily., Disp: 150 g, Rfl: 5    dicyclomine (BENTYL) 10 MG capsule, Take 1 capsule (10 mg total) by mouth 3 (three) times daily as needed (abdominal cramping)., Disp: 30 capsule, Rfl: 2    gabapentin (NEURONTIN) 300 MG capsule, TAKE 1 CAPSULE BY MOUTH EVERY DAY AT NIGHT, Disp: 30 capsule, Rfl: 5    lansoprazole (PREVACID) 30 MG capsule, Take 1 capsule (30 mg total) by mouth once daily., Disp: 90 capsule, Rfl: 3    tacrolimus (PROTOPIC) 0.1 % ointment, Apply topically 2 (two) times daily., Disp: , Rfl:     thyroid, pork, (ARMOUR THYROID) 60 mg Tab, Take 1 tablet (60 mg total) by mouth before breakfast., Disp: 90 tablet, Rfl: 3    cholestyramine, with sugar, 4 gram Powd, Take 4 g by mouth Daily. (Patient not taking: Reported on 7/2/2025), Disp: 30 packet, Rfl: 3    EScitalopram oxalate (LEXAPRO) 20 MG tablet, Take 1 tablet (20 mg total) by mouth once daily. Take 1/2 tablet (10 mg) first 14 days when starting medication, then one tablet (20 mg) daily thereafter, Disp: 90 tablet, Rfl: 3    valACYclovir (VALTREX) 1000 MG tablet,  Take 2 tablets (2,000 mg) followed by 2 tablets (2,000 mg) 12 hours later at first sign of outbreak. Each Rx contains enough medication to treat 3 outbreaks., Disp: 12 tablet, Rfl: 5

## 2025-07-02 NOTE — TELEPHONE ENCOUNTER
Copied from CRM #[id number]. Topic: General Inquiry - Patient Advice  >> Jul 2, 2025  3:38 PM Carmen wrote:  Type:  Needs Medical Advice    Who Called: Ms Ivan     Would the patient rather a call back or a response via [facility]? Call   Best Call Back Number: [phone]  Additional Information: please call

## 2025-07-02 NOTE — TELEPHONE ENCOUNTER
Copied from CRM #[id number]. Topic: General Inquiry - Return Call  >> Jul 2, 2025  3:48 PM Debo wrote:  Patient is returning a phone call.    Who left a message for the patient: Maryellen Lozoya    Does patient know what this is regarding:      Would you like a call back, or a response through your [facility] portal?:       Best call back number:     Comments:

## 2025-07-09 ENCOUNTER — PATIENT MESSAGE (OUTPATIENT)
Dept: PRIMARY CARE CLINIC | Facility: CLINIC | Age: 70
End: 2025-07-09
Payer: MEDICARE

## 2025-07-18 ENCOUNTER — OFFICE VISIT (OUTPATIENT)
Dept: GASTROENTEROLOGY | Facility: CLINIC | Age: 70
End: 2025-07-18
Payer: MEDICARE

## 2025-07-18 VITALS
HEIGHT: 64 IN | SYSTOLIC BLOOD PRESSURE: 122 MMHG | BODY MASS INDEX: 20.85 KG/M2 | DIASTOLIC BLOOD PRESSURE: 73 MMHG | WEIGHT: 122.13 LBS | HEART RATE: 71 BPM

## 2025-07-18 DIAGNOSIS — R14.0 ABDOMINAL BLOATING: ICD-10-CM

## 2025-07-18 DIAGNOSIS — K58.0 IRRITABLE BOWEL SYNDROME WITH DIARRHEA: Primary | ICD-10-CM

## 2025-07-18 PROCEDURE — 99999 PR PBB SHADOW E&M-EST. PATIENT-LVL III: CPT | Mod: PBBFAC,HCNC,,

## 2025-07-18 RX ORDER — CHOLESTYRAMINE 4 G/9G
1 POWDER, FOR SUSPENSION ORAL
COMMUNITY
Start: 2025-07-04 | End: 2025-07-18

## 2025-07-18 RX ORDER — COLESTIPOL HYDROCHLORIDE 1 G/1
1 TABLET ORAL 2 TIMES DAILY
Qty: 60 TABLET | Refills: 11 | Status: SHIPPED | OUTPATIENT
Start: 2025-07-18 | End: 2026-07-18

## 2025-07-18 NOTE — PROGRESS NOTES
6/5 with me -   She experiences diarrhea 4-6 times daily, with symptoms worse in the morning and night, requiring nighttime bowel movements. She reports excessive gas and occasional liquid stools. She describes constant mild nausea and an unusual taste in her mouth, but denies vomiting. Her diet consists primarily of beans, grains, fruits, and vegetables, with milk in tea. A previous trial of dairy elimination did not improve symptoms. Due to these symptoms, she reports decreased social activities and new onset fatigue requiring daily afternoon naps.  Stool studies done all negative.  Lab work done in March of this year unremarkable. Denies unintentional weight loss, fever, chills, vomiting, constipation, esophageal reflux, regurgitation, hematemesis, difficulties swallowing, changes in bowel habits, changes in stool caliber, blood in stool, and abdominal pain.       MEDICAL HISTORY:  She has a history of Giardia from 30 years ago. Upper endoscopy was performed in January of this year and last colonoscopy was performed on June 30, 2021.     LABS AND TESTING:  Multiple stool studies were negative for pathogens. March bloodwork showed elevated cholesterol but was otherwise normal.    Gastroenterology Clinic Consultation Note    Patient ID: Kalina Ivan is a 70 y.o. female.    Chief Complaint: Diarrhea    History of Present Illness    CHIEF COMPLAINT:  Patient presents today for follow up of ongoing GI symptoms including diarrhea and bloating.    HISTORY OF PRESENT ILLNESS:  She reports GI symptoms consistent with irritable bowel syndrome that began in February. She describes ongoing symptoms of significant abdominal bloating and unpredictable diarrhea without associated pain. She experienced a severe episode a few weeks ago characterized by significant vomiting and drastic diarrhea lasting several hours that was anxiety induced. She denies persistent abdominal pain associated with these episodes. She describes her  ongoing GI symptoms as volatile and confusing, with some improvement noted with medication.    STRESS-RELATED SYMPTOMS:  She reports experiencing stress-related GI symptoms including stomach discomfort, diarrhea, and an isolated episode of vomiting. She describes recent stressful environmental triggers including loud noise from tree cutting and confrontational interaction with landlord. She inquired about potential correlation between psychological stress and physical symptoms.    LABS / TEST RESULTS:  Recent colonoscopy was negative. Stool studies were negative. EGD revealed no lactose allergy, though potential lactose intolerance remains under consideration.    CURRENT MEDICATIONS:  She is currently taking Lexapro. She was unable to tolerate Questran powder due to unpalatable taste. She has not taken prescribed Bentyl, reporting no significant pain and unclear about its purpose for diarrhea and abdominal spasms.      ROS:  General: -fever, -chills, -fatigue, -weight gain, -weight loss  Eyes: -vision changes, -redness, -discharge  ENT: -ear pain, -nasal congestion, -sore throat  Cardiovascular: -chest pain, -palpitations, -lower extremity edema  Respiratory: -cough, -shortness of breath  Gastrointestinal: -abdominal pain, +nausea, +vomiting, +diarrhea, -constipation, -blood in stool, +bloating  Genitourinary: -dysuria, -hematuria, -frequency  Musculoskeletal: -joint pain, -muscle pain  Skin: -rash, -lesion  Neurological: -headache, -dizziness, -numbness, -tingling  Psychiatric: +anxiety, -depression, -sleep difficulty         Physical Exam  Vitals and nursing note reviewed.   Constitutional:       General: She is not in acute distress.     Appearance: Normal appearance. She is not ill-appearing.   HENT:      Head: Normocephalic and atraumatic.      Right Ear: External ear normal.      Left Ear: External ear normal.      Nose: Nose normal.   Eyes:      General: No scleral icterus.     Extraocular Movements:  Extraocular movements intact.   Cardiovascular:      Rate and Rhythm: Normal rate.   Pulmonary:      Effort: Pulmonary effort is normal. No respiratory distress.   Abdominal:      General: There is no distension.      Palpations: Abdomen is soft.      Tenderness: There is no guarding.   Musculoskeletal:         General: Normal range of motion.      Cervical back: Normal range of motion.   Skin:     General: Skin is warm.   Neurological:      Mental Status: She is alert and oriented to person, place, and time.   Psychiatric:         Mood and Affect: Mood is anxious.         Behavior: Behavior is cooperative.         Thought Content: Thought content normal.         Medical History:  has a past medical history of Hypothyroidism and Osteoporosis.    Surgical History:  has a past surgical history that includes ORIF, ANKLE - Right (Right, 03/27/2023); Open reduction and internal fixation (ORIF) of injury of ankle (Right, 03/27/2023); Fracture surgery; egd, with closed biopsy (01/09/2025); Esophagogastroduodenoscopy (N/A, 1/9/2025); and Colonoscopy (N/A, 6/27/2025).    Family History: family history includes Alcohol abuse in her father; Cancer in her father; Diabetes in her mother; Early death in her father; Heart disease in her mother..       Review of patient's allergies indicates:   Allergen Reactions    Hazelnut Shortness Of Breath       Medications Ordered Prior to Encounter[1]    Labs:  Lab Results   Component Value Date    WBC 6.21 03/07/2025    HGB 13.7 03/07/2025    HCT 42.1 03/07/2025     03/07/2025    CHOL 236 (H) 03/07/2025    TRIG 145 03/07/2025    HDL 68 03/07/2025    ALKPHOS 48 03/07/2025    ALT 22 03/07/2025    AST 20 03/07/2025     03/07/2025    K 4.1 03/07/2025     03/07/2025    CREATININE 0.7 03/07/2025    BUN 15 03/07/2025    CO2 27 03/07/2025    TSH 3.999 03/07/2025    INR 0.9 04/21/2021    HGBA1C 5.4 08/15/2022       Vital Signs:  /73 (BP Location: Left arm, Patient Position:  "Sitting)   Pulse 71   Ht 5' 4" (1.626 m)   Wt 55.4 kg (122 lb 2.2 oz)   BMI 20.96 kg/m²   Body mass index is 20.96 kg/m².    Imaging reviewed:   Imaging reviewed:   MRI PELVIS WITHOUT CONTRAST     CLINICAL HISTORY:  .  Lumbago with sciatica, right side     TECHNIQUE:  Routine MRI evaluation of the pelvis performed without contrast.     COMPARISON:  None.     FINDINGS:  BONES: No fracture.  No avascular necrosis.  No marrow infiltrative process.     FEMOROACETABULAR JOINTS: Questionable chondral fissuring about the bilateral superior weight-bearing acetabular rims.  No subchondral edema.  No significant joint effusion.     TENDONS: Mild thickening and increased signal intensity of the distal insertional fibers of the right gluteus minimus tendon with mild peritendinous edema.  Mild increased signal of the conjoined tendon of the right hamstring complex with mild associated subcortical edema at the ischial tuberosity.  Left gluteus minimus, left hamstring, bilateral gluteus medius, iliopsoas, adductors, rectus femoris and hip external rotators are normal.     MUSCLES: Normal bulk and signal intensity.     MISCELLANEOUS: SI joints are symmetric.  There are degenerative changes of the visualized lower lumbar spine.  Ischiofemoral spaces are normal and symmetric.  Sciatic nerves demonstrate symmetric contour and signal intensity.  Small Tarlov cyst at the left S2-S3 foramen.  Pubic symphysis appears normal.  Visualized lower abdominal and pelvic organs demonstrate no significant abnormalities.     Impression:     1. Mild right gluteus minimus tendinosis and peritendinitis.  2. Mild right hamstring tendinosis with mild subcortical edema at the ischial tuberosity.        Electronically signed by:Lucas Fsoter MD  Date:                                            11/05/2022        Endoscopy reviewed:   Findings:       The esophagus was normal.        A small hiatal hernia was present.        The exam of the stomach was " otherwise normal.        Biopsies were taken with a cold forceps for Helicobacter pylori        testing. Verification of patient identification for the specimen was        done. Estimated blood loss was minimal.        The examined duodenum was normal.   Impression:            - Normal esophagus.                          - Small hiatal hernia.                          - Normal examined duodenum.                          - Biopsies were taken with a cold forceps for                          Helicobacter pylori testing.   Recommendation:        - Discharge patient to home.                          - Resume previous diet.                          - Continue present medications.                          - Await pathology results.   Jose Bruno MD   1/9/2025 9:51:04 AM        Endoscopy reviewed:   Procedure:             Colonoscopy   Indications:           Chronic diarrhea   Providers:             Alex Mancilla MD, Lizz Chang RN,                          Jennifer Hinojosa CRNA, Kim Favre, LPN   Patient Profile:       This is a 70 year old female. Refer to note in                          patient chart for documentation of history and                          physical. Last Colonoscopy: 2021.   Referring MD:          Sommer Cantu NP   Complications:         No immediate complications.   Medicines:             Monitored Anesthesia Care   Procedure:             Pre-Anesthesia Assessment:                          - Prior to the procedure, a History and Physical                          was performed, and patient medications and                          allergies were reviewed. The patient is competent.                          The risks and benefits of the procedure and the                          sedation options and risks were discussed with the                          patient. All questions were answered and informed                          consent was obtained. Patient identification and                           proposed procedure were verified by the physician,                          the nurse, the anesthetist and the technician in                          the procedure room. Mental Status Examination:                          alert and oriented. Airway Examination: normal                          oropharyngeal airway and neck mobility.                          Respiratory Examination: clear to auscultation. CV                          Examination: normal. Prophylactic Antibiotics: The                          patient does not require prophylactic antibiotics.                          Prior Anticoagulants: The patient has taken no                          anticoagulant or antiplatelet agents. ASA Grade                          Assessment: II - A patient with mild systemic                          disease. After reviewing the risks and benefits,                          the patient was deemed in satisfactory condition                          to undergo the procedure. The anesthesia plan was                          to use monitored anesthesia care (MAC).                          Immediately prior to administration of                          medications, the patient was re-assessed for                          adequacy to receive sedatives. The heart rate,                          respiratory rate, oxygen saturations, blood                          pressure, adequacy of pulmonary ventilation, and                          response to care were monitored throughout the                          procedure. The physical status of the patient was                          re-assessed after the procedure.                          After I obtained informed consent, the scope was                          passed under direct vision. Throughout the                          procedure, the patient's blood pressure, pulse,                          and oxygen saturations were monitored continuously.                           The [employer] scope EC-760R-V/L ([id number]) was                          introduced through the anus and advanced to the                          terminal ileum, with identification of the                          appendiceal orifice and IC valve. The colonoscopy                          was performed without difficulty. The patient                          tolerated the procedure well. The quality of the                          bowel preparation was good. The terminal ileum,                          ileocecal valve, appendiceal orifice, and rectum                          were photographed. Polyp detection was aided by                          AI-assisted image processing.   Findings:       The perianal and digital rectal examinations were normal.        The retroflexed view of the distal rectum and anal verge was normal        and showed no anal or rectal abnormalities.        A few small-mouthed diverticula were found in the sigmoid colon.        An area of mildly congested mucosa was found in the cecum and at the        ileocecal valve. Biopsies were taken with a cold forceps for        histology.        No other significant abnormalities were identified in a careful        examination of the remainder of the colon.        Biopsies for histology were taken with a cold forceps from the        entire colon for evaluation of microscopic colitis.        The terminal ileum appeared normal.   Impression:            - The distal rectum and anal verge are normal on                          retroflexion view.                          - Diverticulosis in the sigmoid colon.                          - Congested mucosa in the cecum and at the                          ileocecal valve. Biopsied.                          - The examined portion of the ileum was normal.                          - Biopsies were taken with a cold forceps from the                          entire colon for evaluation of microscopic colitis.    Recommendation:        - Discharge patient to home.                          - Resume previous diet today.                          - Continue present medications.                          - Await pathology results.                          - Repeat colonoscopy in 10 years for screening                          purposes.                          - Return to referring provider as previously                          scheduled.                          - Patient has a contact number available for                          emergencies. The signs and symptoms of potential                          delayed complications were discussed with the                          patient. Return to normal activities tomorrow.                          Written discharge instructions were provided to                          the patient.   Attending Participation:        I personally performed the entire procedure.   Alex Mancilla MD   6/27/2025 8:49:40 AM       Assessment:  1. Irritable bowel syndrome with diarrhea      Orders Placed This Encounter    colestipoL (COLESTID) 1 gram Tab       Assessment & Plan        IRRITABLE BOWEL SYNDROME WITH DIARRHEA:  - Diagnosed IBS with diarrhea based on negative stool studies and colonoscopy.  - Determined treatment approach focused on symptom management, including diarrhea control and addressing bloating.  - No further diagnostic testing necessary at this time, as both upper and lower GI tract have been evaluated.  - Explained that IBS is a functional disorder involving gut-brain interaction.  - Provided information on FODMAP diet as a potential management strategy for IBS.  - Recommend implementing lifestyle changes and stress reduction techniques to manage IBS symptoms.  - Switched from Questran powder to Colestipol pills (2 tablets twice daily with meals) due to intolerance of the powder form.  - Continued Bentyl as needed for diarrhea, particularly before going out in public.  -  Recommend OTC options for bloating control: IB Carmina, Gaviscon, Gas-X, charcoal caps (to be taken as needed).  - Advised to take fiber supplement (such as Benefiber or Citrucel) regularly, preferably in the morning.  - Explained that lactose intolerance is different from a lactose allergy and can contribute to IBS symptoms.  - Suggested Lactaid pills or Lactaid products for managing potential lactose intolerance.  - Referred to GI dietitian for assistance with FODMAP diet implementation (to be scheduled).    ANXIETY AND STRESS-RELATED FACTORS:  - Discussed the potential relationship between stress and IBS symptom exacerbation.  - Evaluated current medication regimen, including Lexapro for anxiety/depression management.    FOLLOW-UP:  - Consider follow-up with functional GI medicine doctor (Dr. Crow) if symptoms persist despite current treatment plan.          OTC medications for bloating: Gas-X, Gaviscon, CharcoCaps, IB Carmina, Simethicone    Diet: Eat and drink more slowly to swallow less air. Limit fatty and spicy foods. Avoid caffeine, carbonated drinks, and artificial sweeteners. Avoid common gas-causing foods, such as beans, peas, lentils, cabbage, onions, broccoli, cauliflower, and whole grains. Try removing one food at a time from your diet to see if your gas improves.  Fiber: Fiber has many benefits, although too much fiber may increase the amount of gas in your intestines.  Exercise: Regular daily exercise often reduces symptoms in the stomach and intestines.  Laxatives: Over-the-counter laxatives, such as polyethylene glycol (one brand: Miralax), may help with constipation but probably not with stomach pain.  Antidiarrheal medicines: Over-the-counter loperamide may help with diarrhea but probably not with stomach pain.  Probiotics: Probiotics are found in some over-the-counter supplements and yogurts. Common probiotics are Lactobacillus and Bifidobacterium.           SUKHDEV ADAMS, KEONP-C  Gastroenterology  Department [facility] Office [phone]     This note was generated with the assistance of ambient listening technology. Verbal consent was obtained by the patient and accompanying visitor(s) for the recording of patient appointment to facilitate this note. I attest to having reviewed and edited the generated note for accuracy, though some syntax or spelling errors may persist. Please contact the author of this note for any clarification.                      [1]   Current Outpatient Medications on File Prior to Visit   Medication Sig Dispense Refill    alendronate (FOSAMAX) 70 MG tablet Take 1 tablet (70 mg total) by mouth every 7 days. 12 tablet 3    calcium-vitamin D 600 mg-10 mcg (400 unit) Tab Take 1 tablet by mouth 2 (two) times daily. 180 tablet 1    diclofenac sodium (VOLTAREN) 1 % Gel Apply 2 g topically once daily. 150 g 5    dicyclomine (BENTYL) 10 MG capsule Take 1 capsule (10 mg total) by mouth 3 (three) times daily as needed (abdominal cramping). 30 capsule 2    EScitalopram oxalate (LEXAPRO) 20 MG tablet Take 1 tablet (20 mg total) by mouth once daily. Take 1/2 tablet (10 mg) first 14 days when starting medication, then one tablet (20 mg) daily thereafter 90 tablet 3    gabapentin (NEURONTIN) 300 MG capsule TAKE 1 CAPSULE BY MOUTH EVERY DAY AT NIGHT 30 capsule 5    lansoprazole (PREVACID) 30 MG capsule Take 1 capsule (30 mg total) by mouth once daily. 90 capsule 3    tacrolimus (PROTOPIC) 0.1 % ointment Apply topically 2 (two) times daily.      thyroid, pork, (ARMOUR THYROID) 60 mg Tab Take 1 tablet (60 mg total) by mouth before breakfast. 90 tablet 3    valACYclovir (VALTREX) 1000 MG tablet Take 2 tablets (2,000 mg) followed by 2 tablets (2,000 mg) 12 hours later at first sign of outbreak. Each Rx contains enough medication to treat 3 outbreaks. 12 tablet 5    [DISCONTINUED] cholestyramine (QUESTRAN) 4 gram packet Take 1 packet by mouth.      [DISCONTINUED] cholestyramine,  with sugar, 4 gram Powd Take 4 g by mouth Daily. (Patient not taking: Reported on 7/18/2025) 30 packet 3     No current facility-administered medications on file prior to visit.

## 2025-07-22 ENCOUNTER — TELEPHONE (OUTPATIENT)
Dept: PSYCHOLOGY | Facility: CLINIC | Age: 70
End: 2025-07-22
Payer: MEDICARE

## 2025-07-22 NOTE — PROGRESS NOTES
"Outpatient Psychology Follow-up    Date of Service: 2025    Time: 12:23 PM  Name: Kalina Ivan  Age: 70 y.o.  SEX@  @               Visit Length: 45 Minutes    Treatment Modality/Intervention: Individual Psychotherapy      Chief Complaint:   The chief complaint leading to consultation is: depression and anxiety    History of Present Illness    CHIEF COMPLAINT:  Patient presents for follow-up of generalized anxiety disorder and depression, discussing recent GI issues and ongoing psychological concerns.    HPI:  Patient reports feeling consumed with anxiety and stress, which is unusual for her as she has "never been a worrier" or an anxious person before. She expresses feeling "not like herself" and describes a sense of fragmentation, bitterness, and resentment about her life situation. These feelings have persisted since returning from a trip to [place] about three months ago.    Patient recently received a diagnosis of Irritable Bowel Syndrome (IBS) after a process of elimination for her GI issues. She describes the condition as "physically depleting" and will be seeing a dietician next week to discuss the low FODMAP diet. Patient expresses concern about dietary restrictions, particularly for foods she enjoys like onions, garlic, cheese, and beans.    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 However, she mentions feeling comfortable sleeping at the house but uncomfortable waking up there. She has four more sessions of physical therapy for neck pain, with a plan to continue sessions every two weeks for the next eight weeks.    Patient recently switched from Sertraline to Lexapro. She expresses frustration with managing multiple medications, including thyroid medication and methimazole, due to timing restrictions between doses. She has not noticed a difference with Lexapro yet but acknowledges she has not been on it for long.    Patient " "describes staying at someone else's house and feeling conflicted about it. She expresses discomfort and a sense of obligation, stating, "I don't want to be here." This situation appears to be contributing to her feelings of displacement and anxiety. Patient denies feeling worried or anxious prior to recent events.    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 She expresses difficulty in maintaining employment and managing finances throughout her life. She is currently staying at a friend's home while they are out of town, which she describes as feeling alien and uncomfortable. Patient also mentions having her own place, which she returns to occasionally. She has lived in [place] and [place] in the past. Patient recently attended a workshop where she felt connected to others. However, she expresses difficulty in maintaining social connections and often feels like an outsider.    FAMILY HISTORY:  Family history is significant for mother being described as contemptuous towards her. Her father is described as critical towards the patient.    MEDICATIONS:  Patient is on Lexapro for depression/anxiety, having switched from Sertraline. She takes Armor thyroid orally at 7 AM and is on Methimazole for a thyroid condition. Patient is also taking Naproxen.            Patient's response to intervention:  The patient's response to intervention is accepting, motivated  Progress toward goals and other mental status changes:  The patient's progress toward goals is fair .        Verbal deficits: None        Psychological Problem List: Problem List[1]        Psychiatric    Physical Exam    Appearance: Appears stated age. Well-groomed. Well-nourished.  Speech: Normal rate. Normal volume. Spontaneous and fluid.  Affect: Restricted affect.  Thought Content: No evidence of aggression. No evidence of homicidal ideation. No evidence of homicidal plan. No evidence of homicidal intent. No " evidence of suicidal ideation. No evidence of suicidal plan. No evidence of suicidal intent. No evidence of delusions.  Memory: Recent memory intact. Remote memory intact.  Behavior: Cooperative. Good eye contact. Engaged. Pleasant.  Mood: Dysphoric.  Thought Form: Linear thinking. Not goal oriented.  Perception: No perceptual abnormalities noted.  Judgement: No evidence of poor decision making.  Insight: Good insight into symptoms. Good insight into treatment options.  Cognition: A&Ox3. Normal attention span. Average fund of knowledge.  Motor: No gross motor abnormalities.               Risk parameters:  Patient reports no suicidal ideation  Patient reports no homicidal ideation  Patient reports no self-injurious behavior  Patient reports no violent behavior  Patient has been given the Suicide crisis line phone number.      Plan:  individual psychotherapy      Diagnosis:    ICD-10-CM ICD-9-CM   1. Generalized anxiety disorder  F41.1 300.02   2. Depression, unspecified depression type  F32.A 311       Assessment & Plan    IMPRESSION:   Diagnosed with generalized anxiety disorder and depression.   Recently switched from Sertraline to Lexapro for depression management.   Noted physical depletion and need for rest, likely related to ongoing GI issues (IBS) and mental health concerns.     Resume gratitude journaling, starting with one simple observation daily   Schedule short social engagements to increase activity and social interaction   Consider reading old emails for self-inspiration and motivation   Practice reframing negative thought patterns and implementing positive self-talk   Set small, achievable goals and acknowledge accomplishments   Consider inviting friends over to the house where staying     Discussed importance of reframing negative thought patterns and practicing gratitude to improve overall well-being.   Explained importance of being present in the moment and accepting current circumstances.    "Discussed concept of "fighting" against life circumstances and its potential negative impact on mental health.   Educated on benefits of gratitude journaling and its role in changing perspective.   Patient to practice self-monitoring of negative thoughts and implement positive self-talk.   Recommend setting small, achievable goals and acknowledging accomplishments.   Patient to resume gratitude journaling, starting with writing down 1 simple observation daily.   Recommend listening to body's needs for rest without guilt.   Patient to consider reading through old emails for self-inspiration and motivation.   Explained normal disruption of routines during travel and strategies to resume healthy habits.     Patient to schedule short social engagements to increase activity and social interaction.   Consider inviting friends over to the house where staying, emphasizing enjoyment rather than ownership.         Target symptoms: depression, anxiety   Why chosen therapy is appropriate versus another modality: relevant to diagnosis, patient responds to this modality, evidence based practice  Outcome monitoring methods: self-report, observation  Therapeutic intervention type: insight oriented psychotherapy, behavior modifying psychotherapy   Topics discussed/themes: building skills sets for symptom management   The patient's response to the intervention is accepting, motivated.    The patient's progress toward treatment goals is fair.   Encouraged patient to keep future appointments.  Instruct patient to call or message with questions.  In the event of an emergency, including suicidal ideation, plan or intent or homicidal ideation, plan or intent, patient advised to call [phone] or present at the nearest ED.       Treatment Goals:  Reduce overall frequency, intensity, and duration of the anxiety so that daily functioning is not impaired.  Stabilize anxiety level while increasing ability to function on a daily basis.  Resolve " the core conflict that is the source of anxiety.  Enhance ability to effectively cope with the full variety of life's worries and anxieties.  Alleviate depressive symptoms and return to previous level of effective functioning.          Follow-up Plan:     individual psychotherapy      Return to clinic: Follow up in about 2 weeks (around 8/6/2025).    This note was generated with the assistance of ambient listening technology. Verbal consent was obtained by the patient and accompanying visitor(s) for the recording of patient appointment to facilitate this note. I attest to having reviewed and edited the generated note for accuracy, though some syntax or spelling errors may persist. Please contact the author of this note for any clarification.                     [1]   Patient Active Problem List  Diagnosis    Malleolar fracture, right, closed, initial encounter    Closed fracture of head of right radius    Hypothyroidism    Osteoporosis

## 2025-07-23 ENCOUNTER — OFFICE VISIT (OUTPATIENT)
Dept: PSYCHOLOGY | Facility: CLINIC | Age: 70
End: 2025-07-23
Payer: MEDICARE

## 2025-07-23 DIAGNOSIS — F41.1 GENERALIZED ANXIETY DISORDER: Primary | ICD-10-CM

## 2025-07-23 DIAGNOSIS — F32.A DEPRESSION, UNSPECIFIED DEPRESSION TYPE: ICD-10-CM

## 2025-07-23 PROCEDURE — 90834 PSYTX W PT 45 MINUTES: CPT | Mod: HCNC,S$GLB,, | Performed by: PSYCHOLOGIST

## 2025-07-31 ENCOUNTER — OFFICE VISIT (OUTPATIENT)
Dept: PRIMARY CARE CLINIC | Facility: CLINIC | Age: 70
End: 2025-07-31
Payer: MEDICARE

## 2025-07-31 VITALS
TEMPERATURE: 98 F | WEIGHT: 121.38 LBS | DIASTOLIC BLOOD PRESSURE: 74 MMHG | BODY MASS INDEX: 20.72 KG/M2 | OXYGEN SATURATION: 98 % | HEART RATE: 70 BPM | SYSTOLIC BLOOD PRESSURE: 122 MMHG | HEIGHT: 64 IN | RESPIRATION RATE: 14 BRPM

## 2025-07-31 DIAGNOSIS — G89.29 CHRONIC RIGHT SHOULDER PAIN: ICD-10-CM

## 2025-07-31 DIAGNOSIS — F32.0 CURRENT MILD EPISODE OF MAJOR DEPRESSIVE DISORDER WITHOUT PRIOR EPISODE: ICD-10-CM

## 2025-07-31 DIAGNOSIS — M25.571 CHRONIC PAIN OF RIGHT ANKLE: Primary | ICD-10-CM

## 2025-07-31 DIAGNOSIS — K58.0 IRRITABLE BOWEL SYNDROME WITH DIARRHEA: ICD-10-CM

## 2025-07-31 DIAGNOSIS — G89.29 CHRONIC PAIN OF RIGHT ANKLE: Primary | ICD-10-CM

## 2025-07-31 DIAGNOSIS — M25.511 CHRONIC RIGHT SHOULDER PAIN: ICD-10-CM

## 2025-07-31 DIAGNOSIS — Z87.81 HISTORY OF FRACTURE OF RIGHT ANKLE: ICD-10-CM

## 2025-07-31 PROCEDURE — 1101F PT FALLS ASSESS-DOCD LE1/YR: CPT | Mod: CPTII,HCNC,S$GLB, | Performed by: STUDENT IN AN ORGANIZED HEALTH CARE EDUCATION/TRAINING PROGRAM

## 2025-07-31 PROCEDURE — 1159F MED LIST DOCD IN RCRD: CPT | Mod: CPTII,HCNC,S$GLB, | Performed by: STUDENT IN AN ORGANIZED HEALTH CARE EDUCATION/TRAINING PROGRAM

## 2025-07-31 PROCEDURE — 1126F AMNT PAIN NOTED NONE PRSNT: CPT | Mod: CPTII,HCNC,S$GLB, | Performed by: STUDENT IN AN ORGANIZED HEALTH CARE EDUCATION/TRAINING PROGRAM

## 2025-07-31 PROCEDURE — 1160F RVW MEDS BY RX/DR IN RCRD: CPT | Mod: CPTII,HCNC,S$GLB, | Performed by: STUDENT IN AN ORGANIZED HEALTH CARE EDUCATION/TRAINING PROGRAM

## 2025-07-31 PROCEDURE — 3078F DIAST BP <80 MM HG: CPT | Mod: CPTII,HCNC,S$GLB, | Performed by: STUDENT IN AN ORGANIZED HEALTH CARE EDUCATION/TRAINING PROGRAM

## 2025-07-31 PROCEDURE — 3074F SYST BP LT 130 MM HG: CPT | Mod: CPTII,HCNC,S$GLB, | Performed by: STUDENT IN AN ORGANIZED HEALTH CARE EDUCATION/TRAINING PROGRAM

## 2025-07-31 PROCEDURE — 99999 PR PBB SHADOW E&M-EST. PATIENT-LVL IV: CPT | Mod: PBBFAC,HCNC,, | Performed by: STUDENT IN AN ORGANIZED HEALTH CARE EDUCATION/TRAINING PROGRAM

## 2025-07-31 PROCEDURE — 99214 OFFICE O/P EST MOD 30 MIN: CPT | Mod: HCNC,S$GLB,, | Performed by: STUDENT IN AN ORGANIZED HEALTH CARE EDUCATION/TRAINING PROGRAM

## 2025-07-31 PROCEDURE — 3008F BODY MASS INDEX DOCD: CPT | Mod: CPTII,HCNC,S$GLB, | Performed by: STUDENT IN AN ORGANIZED HEALTH CARE EDUCATION/TRAINING PROGRAM

## 2025-07-31 PROCEDURE — 3288F FALL RISK ASSESSMENT DOCD: CPT | Mod: CPTII,HCNC,S$GLB, | Performed by: STUDENT IN AN ORGANIZED HEALTH CARE EDUCATION/TRAINING PROGRAM

## 2025-07-31 NOTE — PROGRESS NOTES
"Subjective:       Patient ID: Kalina Ivan is a 70 y.o. female.    Chief Complaint: Follow-up    HPI:  70 y.o. female presents to [facility] here for follow-up visit    Was last seen in clinic 7/2/2025 and started on Lexapro for depression.    Tolerating medication?: Yes   Symptoms improved?: Yes, has been feeling more optimistic past week.    Diarrhea?: Has improved since last seen. Saw Nutritionist for dietary guidance. Feels plan was unclear following visit. Will see in one week to further discuss. Feels that she has been fatigued and possibly related to dietary modifications to help with diarrhea. Has about 3 PT sessions left for neck, shoulder is still having pain.    Using FODMAP    Is on colestipol now for symptoms and metamucil    Dr. Crow?: Declines need at this time.    Review of Systems   Constitutional:  Positive for fatigue. Negative for appetite change (Has been okay) and unexpected weight change.   Gastrointestinal:  Positive for diarrhea (Improved). Negative for abdominal pain.   Psychiatric/Behavioral:  Positive for dysphoric mood (Imoproved past week) and sleep disturbance (Sleeping a lot).        Objective:      Vitals:    07/31/25 0923   BP: 122/74   BP Location: Right arm   Patient Position: Sitting   Pulse: 70   Resp: 14   Temp: 98 °F (36.7 °C)   TempSrc: Oral   SpO2: 98%   Weight: 55 kg (121 lb 5.8 oz)   Height: 5' 4" (1.626 m)     Physical Exam  Vitals reviewed.   Constitutional:       General: She is not in acute distress.     Appearance: Normal appearance. She is not ill-appearing.   HENT:      Head: Normocephalic and atraumatic.   Eyes:      General:         Right eye: No discharge.         Left eye: No discharge.      Conjunctiva/sclera: Conjunctivae normal.   Cardiovascular:      Rate and Rhythm: Normal rate and regular rhythm.      Pulses: Normal pulses.      Heart sounds: No murmur heard.  Pulmonary:      Effort: Pulmonary effort is normal.      Breath sounds: Normal breath sounds. " "  Musculoskeletal:         General: No deformity.      Cervical back: Neck supple. No rigidity.   Lymphadenopathy:      Cervical: No cervical adenopathy.   Skin:     General: Skin is warm and dry.      Coloration: Skin is not jaundiced.   Neurological:      General: No focal deficit present.      Mental Status: She is alert and oriented to person, place, and time.   Psychiatric:         Mood and Affect: Mood normal.         Behavior: Behavior normal.             Lab Results   Component Value Date     03/07/2025    K 4.1 03/07/2025     03/07/2025    CO2 27 03/07/2025    BUN 15 03/07/2025    CREATININE 0.7 03/07/2025    ANIONGAP 9 03/07/2025     Lab Results   Component Value Date    HGBA1C 5.4 08/15/2022     No results found for: "BNP", "BNPTRIAGEBLO"    Lab Results   Component Value Date    WBC 6.21 03/07/2025    HGB 13.7 03/07/2025    HCT 42.1 03/07/2025     03/07/2025    GRAN 3.2 03/07/2025    GRAN 51.9 03/07/2025     Lab Results   Component Value Date    CHOL 236 (H) 03/07/2025    HDL 68 03/07/2025    LDLCALC 139.0 03/07/2025    TRIG 145 03/07/2025        Current Medications[1]        Assessment:       1. Chronic pain of right ankle    2. History of fracture of right ankle    3. Chronic right shoulder pain    4. Current mild episode of major depressive disorder without prior episode    5. Irritable bowel syndrome with diarrhea           Plan:       Chronic pain of right ankle  History of fracture of right ankle  Chronic right shoulder pain  -     [facility] referral[facility]consult to [facility]; Future; Expected date: 08/07/2025  - Patient amenable to mind/body wellness program    Current mild episode of major depressive disorder without prior episode  Irritable bowel syndrome with diarrhea  - Doing well on current medication, may see further improvement in IBS and depression in coming weeks    RTC as scheduled         [1]   Current Outpatient Medications:     calcium-vitamin D 600 " mg-10 mcg (400 unit) Tab, Take 1 tablet by mouth 2 (two) times daily., Disp: 180 tablet, Rfl: 1    colestipoL (COLESTID) 1 gram Tab, Take 1 tablet (1 g total) by mouth 2 (two) times daily., Disp: 60 tablet, Rfl: 11    dicyclomine (BENTYL) 10 MG capsule, Take 1 capsule (10 mg total) by mouth 3 (three) times daily as needed (abdominal cramping)., Disp: 30 capsule, Rfl: 2    EScitalopram oxalate (LEXAPRO) 20 MG tablet, Take 1 tablet (20 mg total) by mouth once daily. Take 1/2 tablet (10 mg) first 14 days when starting medication, then one tablet (20 mg) daily thereafter, Disp: 90 tablet, Rfl: 3    gabapentin (NEURONTIN) 300 MG capsule, TAKE 1 CAPSULE BY MOUTH EVERY DAY AT NIGHT, Disp: 30 capsule, Rfl: 5    lansoprazole (PREVACID) 30 MG capsule, Take 1 capsule (30 mg total) by mouth once daily., Disp: 90 capsule, Rfl: 3    tacrolimus (PROTOPIC) 0.1 % ointment, Apply topically 2 (two) times daily., Disp: , Rfl:     thyroid, pork, (ARMOUR THYROID) 60 mg Tab, Take 1 tablet (60 mg total) by mouth before breakfast., Disp: 90 tablet, Rfl: 3    valACYclovir (VALTREX) 1000 MG tablet, Take 2 tablets (2,000 mg) followed by 2 tablets (2,000 mg) 12 hours later at first sign of outbreak. Each Rx contains enough medication to treat 3 outbreaks., Disp: 12 tablet, Rfl: 5    alendronate (FOSAMAX) 70 MG tablet, Take 1 tablet (70 mg total) by mouth every 7 days., Disp: 12 tablet, Rfl: 3

## 2025-08-04 ENCOUNTER — TELEPHONE (OUTPATIENT)
Dept: GASTROENTEROLOGY | Facility: CLINIC | Age: 70
End: 2025-08-04
Payer: MEDICARE

## 2025-08-04 NOTE — TELEPHONE ENCOUNTER
Copied from CRM #[id number]. Topic: Appointments - Appointment Scheduling  >> Aug 4, 2025  8:44 AM Azucena wrote:  Scheduling Request    Appt Type:  Ep    Date/Time Preference: Next available    Treating Provider:JOSE Cantu    Caller Name:Pt    Contact Preference:[phone]     Comment: Pt would like to schedule with the dietician she saw last week on 07/28 . No name listed,but dietician informed pt that madhuri would give her a call to schedule , pt was supposed to see the dietician within 1 week    Please Call to Advise,Thank you.

## 2025-08-11 ENCOUNTER — TELEPHONE (OUTPATIENT)
Dept: PSYCHOLOGY | Facility: CLINIC | Age: 70
End: 2025-08-11
Payer: MEDICARE

## 2025-08-12 ENCOUNTER — OFFICE VISIT (OUTPATIENT)
Dept: PSYCHOLOGY | Facility: CLINIC | Age: 70
End: 2025-08-12
Payer: MEDICARE

## 2025-08-12 DIAGNOSIS — F32.A DEPRESSION, UNSPECIFIED DEPRESSION TYPE: ICD-10-CM

## 2025-08-12 DIAGNOSIS — F41.1 GENERALIZED ANXIETY DISORDER: Primary | ICD-10-CM

## 2025-08-12 PROCEDURE — 90834 PSYTX W PT 45 MINUTES: CPT | Mod: HCNC,S$GLB,, | Performed by: PSYCHOLOGIST

## 2025-08-14 ENCOUNTER — PATIENT OUTREACH (OUTPATIENT)
Dept: ADMINISTRATIVE | Facility: HOSPITAL | Age: 70
End: 2025-08-14
Payer: MEDICARE

## 2025-08-14 DIAGNOSIS — Z12.31 BREAST CANCER SCREENING BY MAMMOGRAM: Primary | ICD-10-CM

## 2025-08-19 ENCOUNTER — PATIENT MESSAGE (OUTPATIENT)
Dept: PRIMARY CARE CLINIC | Facility: CLINIC | Age: 70
End: 2025-08-19
Payer: MEDICARE

## 2025-08-19 ENCOUNTER — PATIENT MESSAGE (OUTPATIENT)
Dept: GASTROENTEROLOGY | Facility: CLINIC | Age: 70
End: 2025-08-19
Payer: MEDICARE

## 2025-08-25 ENCOUNTER — OFFICE VISIT (OUTPATIENT)
Dept: PAIN MEDICINE | Facility: OTHER | Age: 70
End: 2025-08-25
Payer: MEDICARE

## 2025-08-25 DIAGNOSIS — R26.89 IMBALANCE: ICD-10-CM

## 2025-08-25 DIAGNOSIS — R53.1 DECREASED STRENGTH, ENDURANCE, AND MOBILITY: ICD-10-CM

## 2025-08-25 DIAGNOSIS — F41.9 ANXIETY: ICD-10-CM

## 2025-08-25 DIAGNOSIS — R68.89 DECREASED STRENGTH, ENDURANCE, AND MOBILITY: ICD-10-CM

## 2025-08-25 DIAGNOSIS — M79.2 NEUROPATHIC PAIN OF RIGHT ANKLE: Primary | ICD-10-CM

## 2025-08-25 DIAGNOSIS — G89.29 CHRONIC RIGHT SHOULDER PAIN: ICD-10-CM

## 2025-08-25 DIAGNOSIS — M25.511 CHRONIC RIGHT SHOULDER PAIN: ICD-10-CM

## 2025-08-25 DIAGNOSIS — Z74.09 DECREASED STRENGTH, ENDURANCE, AND MOBILITY: ICD-10-CM

## 2025-08-25 PROCEDURE — 1159F MED LIST DOCD IN RCRD: CPT | Mod: CPTII,HCNC,, | Performed by: NURSE PRACTITIONER

## 2025-08-25 PROCEDURE — 1160F RVW MEDS BY RX/DR IN RCRD: CPT | Mod: CPTII,HCNC,, | Performed by: NURSE PRACTITIONER

## 2025-08-25 PROCEDURE — 99205 OFFICE O/P NEW HI 60 MIN: CPT | Mod: HCNC,,, | Performed by: NURSE PRACTITIONER

## 2025-08-27 ENCOUNTER — TELEPHONE (OUTPATIENT)
Dept: PSYCHOLOGY | Facility: CLINIC | Age: 70
End: 2025-08-27
Payer: MEDICARE

## 2025-08-28 ENCOUNTER — OFFICE VISIT (OUTPATIENT)
Dept: PSYCHOLOGY | Facility: CLINIC | Age: 70
End: 2025-08-28
Payer: MEDICARE

## 2025-08-28 DIAGNOSIS — F41.1 GENERALIZED ANXIETY DISORDER: Primary | ICD-10-CM

## 2025-08-28 DIAGNOSIS — F32.A DEPRESSION, UNSPECIFIED DEPRESSION TYPE: ICD-10-CM

## 2025-09-03 ENCOUNTER — CLINICAL SUPPORT (OUTPATIENT)
Dept: REHABILITATION | Facility: OTHER | Age: 70
End: 2025-09-03
Payer: MEDICARE

## 2025-09-03 DIAGNOSIS — Z74.09 IMPAIRED FUNCTIONAL MOBILITY AND ACTIVITY TOLERANCE: ICD-10-CM

## 2025-09-03 DIAGNOSIS — G89.29 CENTRAL SENSITIZATION TO PAIN: Primary | ICD-10-CM

## 2025-09-03 PROCEDURE — 97163 PT EVAL HIGH COMPLEX 45 MIN: CPT | Mod: HCNC

## 2025-09-03 PROCEDURE — 97530 THERAPEUTIC ACTIVITIES: CPT | Mod: HCNC
